# Patient Record
Sex: MALE | Race: BLACK OR AFRICAN AMERICAN | Employment: FULL TIME | URBAN - METROPOLITAN AREA
[De-identification: names, ages, dates, MRNs, and addresses within clinical notes are randomized per-mention and may not be internally consistent; named-entity substitution may affect disease eponyms.]

---

## 2017-10-14 ENCOUNTER — HOSPITAL ENCOUNTER (EMERGENCY)
Age: 47
Discharge: HOME | End: 2017-10-14
Attending: STUDENT IN AN ORGANIZED HEALTH CARE EDUCATION/TRAINING PROGRAM
Payer: SUBSIDIZED

## 2017-10-14 ENCOUNTER — APPOINTMENT (OUTPATIENT)
Dept: GENERAL RADIOLOGY | Age: 47
End: 2017-10-14
Attending: NURSE PRACTITIONER
Payer: SUBSIDIZED

## 2017-10-14 VITALS
DIASTOLIC BLOOD PRESSURE: 95 MMHG | OXYGEN SATURATION: 97 % | WEIGHT: 218 LBS | HEIGHT: 73 IN | BODY MASS INDEX: 28.89 KG/M2 | SYSTOLIC BLOOD PRESSURE: 168 MMHG | TEMPERATURE: 98.6 F | HEART RATE: 98 BPM | RESPIRATION RATE: 18 BRPM

## 2017-10-14 DIAGNOSIS — I10 HYPERTENSION, UNSPECIFIED TYPE: Primary | ICD-10-CM

## 2017-10-14 DIAGNOSIS — M54.50 ACUTE LOW BACK PAIN WITHOUT SCIATICA, UNSPECIFIED BACK PAIN LATERALITY: ICD-10-CM

## 2017-10-14 LAB
ANION GAP SERPL CALC-SCNC: 5 MMOL/L (ref 5–15)
APPEARANCE UR: CLEAR
BACTERIA URNS QL MICRO: NEGATIVE /HPF
BILIRUB UR QL: NEGATIVE
BUN SERPL-MCNC: 11 MG/DL (ref 6–20)
BUN/CREAT SERPL: 11 (ref 12–20)
CALCIUM SERPL-MCNC: 8.7 MG/DL (ref 8.5–10.1)
CHLORIDE SERPL-SCNC: 106 MMOL/L (ref 97–108)
CO2 SERPL-SCNC: 26 MMOL/L (ref 21–32)
COLOR UR: ABNORMAL
CREAT SERPL-MCNC: 1 MG/DL (ref 0.7–1.3)
EPITH CASTS URNS QL MICRO: ABNORMAL /LPF
GLUCOSE SERPL-MCNC: 88 MG/DL (ref 65–100)
GLUCOSE UR STRIP.AUTO-MCNC: NEGATIVE MG/DL
HGB UR QL STRIP: ABNORMAL
HYALINE CASTS URNS QL MICRO: ABNORMAL /LPF (ref 0–5)
KETONES UR QL STRIP.AUTO: NEGATIVE MG/DL
LEUKOCYTE ESTERASE UR QL STRIP.AUTO: NEGATIVE
NITRITE UR QL STRIP.AUTO: NEGATIVE
PH UR STRIP: 5.5 [PH] (ref 5–8)
POTASSIUM SERPL-SCNC: 3.9 MMOL/L (ref 3.5–5.1)
PROT UR STRIP-MCNC: 100 MG/DL
RBC #/AREA URNS HPF: ABNORMAL /HPF (ref 0–5)
SODIUM SERPL-SCNC: 137 MMOL/L (ref 136–145)
SP GR UR REFRACTOMETRY: 1.02 (ref 1–1.03)
UR CULT HOLD, URHOLD: NORMAL
UROBILINOGEN UR QL STRIP.AUTO: 1 EU/DL (ref 0.2–1)
WBC URNS QL MICRO: ABNORMAL /HPF (ref 0–4)

## 2017-10-14 PROCEDURE — 99284 EMERGENCY DEPT VISIT MOD MDM: CPT

## 2017-10-14 PROCEDURE — 74011250637 HC RX REV CODE- 250/637: Performed by: NURSE PRACTITIONER

## 2017-10-14 PROCEDURE — 36415 COLL VENOUS BLD VENIPUNCTURE: CPT | Performed by: NURSE PRACTITIONER

## 2017-10-14 PROCEDURE — 81001 URINALYSIS AUTO W/SCOPE: CPT | Performed by: NURSE PRACTITIONER

## 2017-10-14 PROCEDURE — 80048 BASIC METABOLIC PNL TOTAL CA: CPT | Performed by: NURSE PRACTITIONER

## 2017-10-14 PROCEDURE — 72100 X-RAY EXAM L-S SPINE 2/3 VWS: CPT

## 2017-10-14 RX ORDER — CLONIDINE HYDROCHLORIDE 0.1 MG/1
0.2 TABLET ORAL
Status: COMPLETED | OUTPATIENT
Start: 2017-10-14 | End: 2017-10-14

## 2017-10-14 RX ORDER — ACETAMINOPHEN 325 MG/1
650 TABLET ORAL
Status: COMPLETED | OUTPATIENT
Start: 2017-10-14 | End: 2017-10-14

## 2017-10-14 RX ORDER — HYDROCHLOROTHIAZIDE 25 MG/1
25 TABLET ORAL DAILY
Qty: 14 TAB | Refills: 0 | Status: SHIPPED | OUTPATIENT
Start: 2017-10-14 | End: 2017-10-28

## 2017-10-14 RX ADMIN — ACETAMINOPHEN 650 MG: 325 TABLET, FILM COATED ORAL at 12:36

## 2017-10-14 RX ADMIN — CLONIDINE HYDROCHLORIDE 0.2 MG: 0.1 TABLET ORAL at 12:35
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
## 2017-10-14 NOTE — DISCHARGE INSTRUCTIONS
Back Pain: Care Instructions  Your Care Instructions    Back pain has many possible causes. It is often related to problems with muscles and ligaments of the back. It may also be related to problems with the nerves, discs, or bones of the back. Moving, lifting, standing, sitting, or sleeping in an awkward way can strain the back. Sometimes you don't notice the injury until later. Arthritis is another common cause of back pain. Although it may hurt a lot, back pain usually improves on its own within several weeks. Most people recover in 12 weeks or less. Using good home treatment and being careful not to stress your back can help you feel better sooner. Follow-up care is a key part of your treatment and safety. Be sure to make and go to all appointments, and call your doctor if you are having problems. Its also a good idea to know your test results and keep a list of the medicines you take. How can you care for yourself at home? · Sit or lie in positions that are most comfortable and reduce your pain. Try one of these positions when you lie down:  ¨ Lie on your back with your knees bent and supported by large pillows. ¨ Lie on the floor with your legs on the seat of a sofa or chair. Clevester Sanes on your side with your knees and hips bent and a pillow between your legs. ¨ Lie on your stomach if it does not make pain worse. · Do not sit up in bed, and avoid soft couches and twisted positions. Bed rest can help relieve pain at first, but it delays healing. Avoid bed rest after the first day of back pain. · Change positions every 30 minutes. If you must sit for long periods of time, take breaks from sitting. Get up and walk around, or lie in a comfortable position. · Try using a heating pad on a low or medium setting for 15 to 20 minutes every 2 or 3 hours. Try a warm shower in place of one session with the heating pad. · You can also try an ice pack for 10 to 15 minutes every 2 to 3 hours.  Put a thin cloth between the ice pack and your skin. · Take pain medicines exactly as directed. ¨ If the doctor gave you a prescription medicine for pain, take it as prescribed. ¨ If you are not taking a prescription pain medicine, ask your doctor if you can take an over-the-counter medicine. · Take short walks several times a day. You can start with 5 to 10 minutes, 3 or 4 times a day, and work up to longer walks. Walk on level surfaces and avoid hills and stairs until your back is better. · Return to work and other activities as soon as you can. Continued rest without activity is usually not good for your back. · To prevent future back pain, do exercises to stretch and strengthen your back and stomach. Learn how to use good posture, safe lifting techniques, and proper body mechanics. When should you call for help? Call your doctor now or seek immediate medical care if:  · You have new or worsening numbness in your legs. · You have new or worsening weakness in your legs. (This could make it hard to stand up.)  · You lose control of your bladder or bowels. Watch closely for changes in your health, and be sure to contact your doctor if:  · Your pain gets worse. · You are not getting better after 2 weeks. Where can you learn more? Go to http://mayela-rhiannon.info/. Enter B221 in the search box to learn more about \"Back Pain: Care Instructions. \"  Current as of: March 21, 2017  Content Version: 11.3  © 3054-5192 [employer]. Care instructions adapted under license by [employer] (which disclaims liability or warranty for this information). If you have questions about a medical condition or this instruction, always ask your healthcare professional. [address] any warranty or liability for your use of this information. DASH Diet: Care Instructions  Your Care Instructions  The DASH diet is an eating plan that can help lower your blood pressure.  DASH stands for Dietary Approaches to Stop Hypertension. Hypertension is high blood pressure. The DASH diet focuses on eating foods that are high in calcium, potassium, and magnesium. These nutrients can lower blood pressure. The foods that are highest in these nutrients are fruits, vegetables, low-fat dairy products, nuts, seeds, and legumes. But taking calcium, potassium, and magnesium supplements instead of eating foods that are high in those nutrients does not have the same effect. The DASH diet also includes whole grains, fish, and poultry. The DASH diet is one of several lifestyle changes your doctor may recommend to lower your high blood pressure. Your doctor may also want you to decrease the amount of sodium in your diet. Lowering sodium while following the DASH diet can lower blood pressure even further than just the DASH diet alone. Follow-up care is a key part of your treatment and safety. Be sure to make and go to all appointments, and call your doctor if you are having problems. It's also a good idea to know your test results and keep a list of the medicines you take. How can you care for yourself at home? Following the DASH diet  · Eat 4 to 5 servings of fruit each day. A serving is 1 medium-sized piece of fruit, ½ cup chopped or canned fruit, 1/4 cup dried fruit, or 4 ounces (½ cup) of fruit juice. Choose fruit more often than fruit juice. · Eat 4 to 5 servings of vegetables each day. A serving is 1 cup of lettuce or raw leafy vegetables, ½ cup of chopped or cooked vegetables, or 4 ounces (½ cup) of vegetable juice. Choose vegetables more often than vegetable juice. · Get 2 to 3 servings of low-fat and fat-free dairy each day. A serving is 8 ounces of milk, 1 cup of yogurt, or 1 ½ ounces of cheese. · Eat 6 to 8 servings of grains each day.  A serving is 1 slice of bread, 1 ounce of dry cereal, or ½ cup of cooked rice, pasta, or cooked cereal. Try to choose whole-grain products as much as possible. · Limit lean meat, poultry, and fish to 2 servings each day. A serving is 3 ounces, about the size of a deck of cards. · Eat 4 to 5 servings of nuts, seeds, and legumes (cooked dried beans, lentils, and split peas) each week. A serving is 1/3 cup of nuts, 2 tablespoons of seeds, or ½ cup of cooked beans or peas. · Limit fats and oils to 2 to 3 servings each day. A serving is 1 teaspoon of vegetable oil or 2 tablespoons of salad dressing. · Limit sweets and added sugars to 5 servings or less a week. A serving is 1 tablespoon jelly or jam, ½ cup sorbet, or 1 cup of lemonade. · Eat less than 2,300 milligrams (mg) of sodium a day. If you limit your sodium to 1,500 mg a day, you can lower your blood pressure even more. Tips for success  · Start small. Do not try to make dramatic changes to your diet all at once. You might feel that you are missing out on your favorite foods and then be more likely to not follow the plan. Make small changes, and stick with them. Once those changes become habit, add a few more changes. · Try some of the following:  ¨ Make it a goal to eat a fruit or vegetable at every meal and at snacks. This will make it easy to get the recommended amount of fruits and vegetables each day. ¨ Try yogurt topped with fruit and nuts for a snack or healthy dessert. ¨ Add lettuce, tomato, cucumber, and onion to sandwiches. ¨ Combine a ready-made pizza crust with low-fat mozzarella cheese and lots of vegetable toppings. Try using tomatoes, squash, spinach, broccoli, carrots, cauliflower, and onions. ¨ Have a variety of cut-up vegetables with a low-fat dip as an appetizer instead of chips and dip. ¨ Sprinkle sunflower seeds or chopped almonds over salads. Or try adding chopped walnuts or almonds to cooked vegetables. ¨ Try some vegetarian meals using beans and peas. Add garbanzo or kidney beans to salads. Make burritos and tacos with mashed [place] beans or black beans.   Where can you learn more?  Go to http://mayela-rhiannon.info/. Enter U512 in the search box to learn more about \"DASH Diet: Care Instructions. \"  Current as of: April 3, 2017  Content Version: 11.3  © 9929-8570 [employer]. Care instructions adapted under license by [employer] (which disclaims liability or warranty for this information). If you have questions about a medical condition or this instruction, always ask your healthcare professional. [address] any warranty or liability for your use of this information. High Blood Pressure: Care Instructions  Your Care Instructions  If your blood pressure is usually above 140/90, you have high blood pressure, or hypertension. That means the top number is 140 or higher or the bottom number is 90 or higher, or both. Despite what a lot of people think, high blood pressure usually doesn't cause headaches or make you feel dizzy or lightheaded. It usually has no symptoms. But it does increase your risk for heart attack, stroke, and kidney or eye damage. The higher your blood pressure, the more your risk increases. Your doctor will give you a goal for your blood pressure. Your goal will be based on your health and your age. An example of a goal is to keep your blood pressure below 140/90. Lifestyle changes, such as eating healthy and being active, are always important to help lower blood pressure. You might also take medicine to reach your blood pressure goal.  Follow-up care is a key part of your treatment and safety. Be sure to make and go to all appointments, and call your doctor if you are having problems. It's also a good idea to know your test results and keep a list of the medicines you take. How can you care for yourself at home? Medical treatment  · If you stop taking your medicine, your blood pressure will go back up. You may take one or more types of medicine to lower your blood pressure.  Be safe with medicines. Take your medicine exactly as prescribed. Call your doctor if you think you are having a problem with your medicine. · Talk to your doctor before you start taking aspirin every day. Aspirin can help certain people lower their risk of a heart attack or stroke. But taking aspirin isn't right for everyone, because it can cause serious bleeding. · See your doctor regularly. You may need to see the doctor more often at first or until your blood pressure comes down. · If you are taking blood pressure medicine, talk to your doctor before you take decongestants or anti-inflammatory medicine, such as ibuprofen. Some of these medicines can raise blood pressure. · Learn how to check your blood pressure at home. Lifestyle changes  · Stay at a healthy weight. This is especially important if you put on weight around the waist. Losing even 10 pounds can help you lower your blood pressure. · If your doctor recommends it, get more exercise. Walking is a good choice. Bit by bit, increase the amount you walk every day. Try for at least 30 minutes on most days of the week. You also may want to swim, bike, or do other activities. · Avoid or limit alcohol. Talk to your doctor about whether you can drink any alcohol. · Try to limit how much sodium you eat to less than 2,300 milligrams (mg) a day. Your doctor may ask you to try to eat less than 1,500 mg a day. · Eat plenty of fruits (such as bananas and oranges), vegetables, legumes, whole grains, and low-fat dairy products. · Lower the amount of saturated fat in your diet. Saturated fat is found in animal products such as milk, cheese, and meat. Limiting these foods may help you lose weight and also lower your risk for heart disease. · Do not smoke. Smoking increases your risk for heart attack and stroke. If you need help quitting, talk to your doctor about stop-smoking programs and medicines. These can increase your chances of quitting for good.   When should you call for help? Call 911 anytime you think you may need emergency care. This may mean having symptoms that suggest that your blood pressure is causing a serious heart or blood vessel problem. Your blood pressure may be over 180/110. For example, call 911 if:  · You have symptoms of a heart attack. These may include:  ¨ Chest pain or pressure, or a strange feeling in the chest.  ¨ Sweating. ¨ Shortness of breath. ¨ Nausea or vomiting. ¨ Pain, pressure, or a strange feeling in the back, neck, jaw, or upper belly or in one or both shoulders or arms. ¨ Lightheadedness or sudden weakness. ¨ A fast or irregular heartbeat. · You have symptoms of a stroke. These may include:  ¨ Sudden numbness, tingling, weakness, or loss of movement in your face, arm, or leg, especially on only one side of your body. ¨ Sudden vision changes. ¨ Sudden trouble speaking. ¨ Sudden confusion or trouble understanding simple statements. ¨ Sudden problems with walking or balance. ¨ A sudden, severe headache that is different from past headaches. · You have severe back or belly pain. Do not wait until your blood pressure comes down on its own. Get help right away. Call your doctor now or seek immediate care if:  · Your blood pressure is much higher than normal (such as 180/110 or higher), but you don't have symptoms. · You think high blood pressure is causing symptoms, such as:  ¨ Severe headache. ¨ Blurry vision. Watch closely for changes in your health, and be sure to contact your doctor if:  · Your blood pressure measures 140/90 or higher at least 2 times. That means the top number is 140 or higher or the bottom number is 90 or higher, or both. · You think you may be having side effects from your blood pressure medicine. · Your blood pressure is usually normal, but it goes above normal at least 2 times. Where can you learn more? Go to http://mayela-rhiannon.info/.   Enter Q744 in the search box to learn more about \"High Blood Pressure: Care Instructions. \"  Current as of: August 8, 2016  Content Version: 11.3  © 3212-4800 [employer]. Care instructions adapted under license by [employer] (which disclaims liability or warranty for this information). If you have questions about a medical condition or this instruction, always ask your healthcare professional. [address] any warranty or liability for your use of this information.

## 2017-10-14 NOTE — ED NOTES
The patient was discharged home by ER NP in stable condition, accompanied by parent/guardian . The patient is alert and oriented, is in no respiratory distress and has vital signs within normal limits . The patient's diagnosis, condition and treatment were explained to patient or parent/guardian. The patient/responsible party expressed understanding. 1 prescriptions given to pt. No work/school note given to pt. A discharge plan has been developed. A  was not involved in the process. Aftercare instructions were given to the patient.
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
## 2017-10-14 NOTE — ED NOTES
Bedside shift change report given to Bryan Dang (oncoming nurse) by Roxana Connor RN  (offgoing nurse). Report included the following information SBAR.

## 2017-10-14 NOTE — ED NOTES
Bedside report received from [facility] [place]. Pt resting on stretcher. BP remains elevated. Jeny Jameson NP aware. Will continue to monitor.

## 2018-02-13 ENCOUNTER — APPOINTMENT (OUTPATIENT)
Dept: GENERAL RADIOLOGY | Age: 48
DRG: 065 | End: 2018-02-13
Attending: EMERGENCY MEDICINE
Payer: SUBSIDIZED

## 2018-02-13 ENCOUNTER — APPOINTMENT (OUTPATIENT)
Dept: CT IMAGING | Age: 48
DRG: 065 | End: 2018-02-13
Attending: HOSPITALIST
Payer: SUBSIDIZED

## 2018-02-13 ENCOUNTER — APPOINTMENT (OUTPATIENT)
Dept: MRI IMAGING | Age: 48
DRG: 065 | End: 2018-02-13
Attending: HOSPITALIST
Payer: SUBSIDIZED

## 2018-02-13 ENCOUNTER — HOSPITAL ENCOUNTER (INPATIENT)
Age: 48
LOS: 13 days | Discharge: NURSING, REHAB OR LONG-TERM CARE | DRG: 065 | End: 2018-02-26
Attending: EMERGENCY MEDICINE | Admitting: HOSPITALIST
Payer: SUBSIDIZED

## 2018-02-13 ENCOUNTER — APPOINTMENT (OUTPATIENT)
Dept: CT IMAGING | Age: 48
DRG: 065 | End: 2018-02-13
Attending: EMERGENCY MEDICINE
Payer: SUBSIDIZED

## 2018-02-13 DIAGNOSIS — I63.9 CEREBROVASCULAR ACCIDENT (CVA), UNSPECIFIED MECHANISM (HCC): Primary | ICD-10-CM

## 2018-02-13 DIAGNOSIS — R94.31 ABNORMAL EKG: ICD-10-CM

## 2018-02-13 DIAGNOSIS — G81.94 HEMIPARESIS OF LEFT NONDOMINANT SIDE, UNSPECIFIED HEMIPARESIS ETIOLOGY (HCC): ICD-10-CM

## 2018-02-13 DIAGNOSIS — E78.00 HYPERCHOLESTEREMIA: ICD-10-CM

## 2018-02-13 DIAGNOSIS — I16.0 HYPERTENSIVE URGENCY: ICD-10-CM

## 2018-02-13 PROBLEM — R53.1 LEFT-SIDED WEAKNESS: Status: ACTIVE | Noted: 2018-02-13

## 2018-02-13 LAB
ALBUMIN SERPL-MCNC: 3.9 G/DL (ref 3.5–5)
ALBUMIN/GLOB SERPL: 0.9 {RATIO} (ref 1.1–2.2)
ALP SERPL-CCNC: 111 U/L (ref 45–117)
ALT SERPL-CCNC: 25 U/L (ref 12–78)
ANION GAP SERPL CALC-SCNC: 8 MMOL/L (ref 5–15)
AST SERPL-CCNC: 22 U/L (ref 15–37)
ATRIAL RATE: 100 BPM
ATRIAL RATE: 87 BPM
BASOPHILS # BLD: 0.1 K/UL (ref 0–0.1)
BASOPHILS NFR BLD: 1 % (ref 0–1)
BILIRUB SERPL-MCNC: 0.4 MG/DL (ref 0.2–1)
BUN SERPL-MCNC: 12 MG/DL (ref 6–20)
BUN/CREAT SERPL: 11 (ref 12–20)
CALCIUM SERPL-MCNC: 9.4 MG/DL (ref 8.5–10.1)
CALCULATED P AXIS, ECG09: 60 DEGREES
CALCULATED P AXIS, ECG09: 63 DEGREES
CALCULATED R AXIS, ECG10: -10 DEGREES
CALCULATED R AXIS, ECG10: -11 DEGREES
CALCULATED T AXIS, ECG11: 161 DEGREES
CALCULATED T AXIS, ECG11: 169 DEGREES
CHLORIDE SERPL-SCNC: 104 MMOL/L (ref 97–108)
CO2 SERPL-SCNC: 25 MMOL/L (ref 21–32)
CREAT SERPL-MCNC: 1.11 MG/DL (ref 0.7–1.3)
DIAGNOSIS, 93000: NORMAL
DIAGNOSIS, 93000: NORMAL
DIFFERENTIAL METHOD BLD: ABNORMAL
EOSINOPHIL # BLD: 0 K/UL (ref 0–0.4)
EOSINOPHIL NFR BLD: 0 % (ref 0–7)
ERYTHROCYTE [DISTWIDTH] IN BLOOD BY AUTOMATED COUNT: 13.4 % (ref 11.5–14.5)
GLOBULIN SER CALC-MCNC: 4.3 G/DL (ref 2–4)
GLUCOSE BLD STRIP.AUTO-MCNC: 108 MG/DL (ref 65–100)
GLUCOSE SERPL-MCNC: 119 MG/DL (ref 65–100)
HCT VFR BLD AUTO: 47.7 % (ref 36.6–50.3)
HGB BLD-MCNC: 15.6 G/DL (ref 12.1–17)
IMM GRANULOCYTES # BLD: 0.1 K/UL (ref 0–0.04)
IMM GRANULOCYTES NFR BLD AUTO: 1 % (ref 0–0.5)
INR BLD: 1 (ref 0.9–1.2)
LYMPHOCYTES # BLD: 2.2 K/UL (ref 0.8–3.5)
LYMPHOCYTES NFR BLD: 21 % (ref 12–49)
MAGNESIUM SERPL-MCNC: 1.6 MG/DL (ref 1.6–2.4)
MCH RBC QN AUTO: 26.1 PG (ref 26–34)
MCHC RBC AUTO-ENTMCNC: 32.7 G/DL (ref 30–36.5)
MCV RBC AUTO: 79.8 FL (ref 80–99)
MONOCYTES # BLD: 0.8 K/UL (ref 0–1)
MONOCYTES NFR BLD: 8 % (ref 5–13)
NEUTS SEG # BLD: 7.4 K/UL (ref 1.8–8)
NEUTS SEG NFR BLD: 70 % (ref 32–75)
NRBC # BLD: 0 K/UL (ref 0–0.01)
NRBC BLD-RTO: 0 PER 100 WBC
P-R INTERVAL, ECG05: 136 MS
P-R INTERVAL, ECG05: 148 MS
PLATELET # BLD AUTO: 284 K/UL (ref 150–400)
PMV BLD AUTO: 11.5 FL (ref 8.9–12.9)
POTASSIUM SERPL-SCNC: 3.7 MMOL/L (ref 3.5–5.1)
PROT SERPL-MCNC: 8.2 G/DL (ref 6.4–8.2)
Q-T INTERVAL, ECG07: 346 MS
Q-T INTERVAL, ECG07: 380 MS
QRS DURATION, ECG06: 88 MS
QRS DURATION, ECG06: 94 MS
QTC CALCULATION (BEZET), ECG08: 446 MS
QTC CALCULATION (BEZET), ECG08: 457 MS
RBC # BLD AUTO: 5.98 M/UL (ref 4.1–5.7)
SERVICE CMNT-IMP: ABNORMAL
SODIUM SERPL-SCNC: 137 MMOL/L (ref 136–145)
TROPONIN I SERPL-MCNC: 0.07 NG/ML
TROPONIN I SERPL-MCNC: 0.09 NG/ML
TROPONIN I SERPL-MCNC: 0.1 NG/ML
TSH SERPL DL<=0.05 MIU/L-ACNC: 0.72 UIU/ML (ref 0.36–3.74)
VENTRICULAR RATE, ECG03: 100 BPM
VENTRICULAR RATE, ECG03: 87 BPM
WBC # BLD AUTO: 10.5 K/UL (ref 4.1–11.1)

## 2018-02-13 PROCEDURE — 74011000250 HC RX REV CODE- 250: Performed by: EMERGENCY MEDICINE

## 2018-02-13 PROCEDURE — 96374 THER/PROPH/DIAG INJ IV PUSH: CPT

## 2018-02-13 PROCEDURE — 70450 CT HEAD/BRAIN W/O DYE: CPT

## 2018-02-13 PROCEDURE — 84484 ASSAY OF TROPONIN QUANT: CPT | Performed by: EMERGENCY MEDICINE

## 2018-02-13 PROCEDURE — 36415 COLL VENOUS BLD VENIPUNCTURE: CPT | Performed by: NURSE PRACTITIONER

## 2018-02-13 PROCEDURE — 74011250637 HC RX REV CODE- 250/637: Performed by: EMERGENCY MEDICINE

## 2018-02-13 PROCEDURE — 93005 ELECTROCARDIOGRAM TRACING: CPT

## 2018-02-13 PROCEDURE — 74011636320 HC RX REV CODE- 636/320: Performed by: EMERGENCY MEDICINE

## 2018-02-13 PROCEDURE — 65660000000 HC RM CCU STEPDOWN

## 2018-02-13 PROCEDURE — 85025 COMPLETE CBC W/AUTO DIFF WBC: CPT | Performed by: EMERGENCY MEDICINE

## 2018-02-13 PROCEDURE — 74011000250 HC RX REV CODE- 250: Performed by: HOSPITALIST

## 2018-02-13 PROCEDURE — 74011250636 HC RX REV CODE- 250/636: Performed by: HOSPITALIST

## 2018-02-13 PROCEDURE — 74011000258 HC RX REV CODE- 258: Performed by: EMERGENCY MEDICINE

## 2018-02-13 PROCEDURE — 71045 X-RAY EXAM CHEST 1 VIEW: CPT

## 2018-02-13 PROCEDURE — 85610 PROTHROMBIN TIME: CPT

## 2018-02-13 PROCEDURE — 84443 ASSAY THYROID STIM HORMONE: CPT | Performed by: NURSE PRACTITIONER

## 2018-02-13 PROCEDURE — 80053 COMPREHEN METABOLIC PANEL: CPT | Performed by: EMERGENCY MEDICINE

## 2018-02-13 PROCEDURE — 74011250637 HC RX REV CODE- 250/637: Performed by: HOSPITALIST

## 2018-02-13 PROCEDURE — 70496 CT ANGIOGRAPHY HEAD: CPT

## 2018-02-13 PROCEDURE — 99285 EMERGENCY DEPT VISIT HI MDM: CPT

## 2018-02-13 PROCEDURE — 82962 GLUCOSE BLOOD TEST: CPT

## 2018-02-13 PROCEDURE — 74177 CT ABD & PELVIS W/CONTRAST: CPT

## 2018-02-13 PROCEDURE — 94762 N-INVAS EAR/PLS OXIMTRY CONT: CPT

## 2018-02-13 PROCEDURE — 83735 ASSAY OF MAGNESIUM: CPT | Performed by: NURSE PRACTITIONER

## 2018-02-13 PROCEDURE — 71260 CT THORAX DX C+: CPT

## 2018-02-13 PROCEDURE — 70498 CT ANGIOGRAPHY NECK: CPT

## 2018-02-13 RX ORDER — ACETAMINOPHEN 650 MG/1
650 SUPPOSITORY RECTAL
Status: DISCONTINUED | OUTPATIENT
Start: 2018-02-13 | End: 2018-02-26

## 2018-02-13 RX ORDER — FAMOTIDINE 20 MG/1
20 TABLET, FILM COATED ORAL EVERY 12 HOURS
Status: DISCONTINUED | OUTPATIENT
Start: 2018-02-13 | End: 2018-02-26 | Stop reason: HOSPADM

## 2018-02-13 RX ORDER — BISACODYL 5 MG
5 TABLET, DELAYED RELEASE (ENTERIC COATED) ORAL DAILY PRN
Status: DISCONTINUED | OUTPATIENT
Start: 2018-02-13 | End: 2018-02-26 | Stop reason: HOSPADM

## 2018-02-13 RX ORDER — GUAIFENESIN 100 MG/5ML
325 LIQUID (ML) ORAL
Status: COMPLETED | OUTPATIENT
Start: 2018-02-13 | End: 2018-02-13

## 2018-02-13 RX ORDER — METOPROLOL TARTRATE 25 MG/1
25 TABLET, FILM COATED ORAL 2 TIMES DAILY
Status: DISCONTINUED | OUTPATIENT
Start: 2018-02-13 | End: 2018-02-13

## 2018-02-13 RX ORDER — ACETAMINOPHEN 325 MG/1
650 TABLET ORAL
Status: DISCONTINUED | OUTPATIENT
Start: 2018-02-13 | End: 2018-02-26

## 2018-02-13 RX ORDER — LABETALOL HYDROCHLORIDE 5 MG/ML
20 INJECTION, SOLUTION INTRAVENOUS
Status: COMPLETED | OUTPATIENT
Start: 2018-02-13 | End: 2018-02-13

## 2018-02-13 RX ORDER — MAGNESIUM SULFATE HEPTAHYDRATE 40 MG/ML
2 INJECTION, SOLUTION INTRAVENOUS ONCE
Status: COMPLETED | OUTPATIENT
Start: 2018-02-13 | End: 2018-02-13

## 2018-02-13 RX ORDER — ESMOLOL HYDROCHLORIDE 10 MG/ML
50 INJECTION, SOLUTION INTRAVENOUS
Status: DISCONTINUED | OUTPATIENT
Start: 2018-02-13 | End: 2018-02-13

## 2018-02-13 RX ORDER — SODIUM CHLORIDE 0.9 % (FLUSH) 0.9 %
10 SYRINGE (ML) INJECTION
Status: COMPLETED | OUTPATIENT
Start: 2018-02-13 | End: 2018-02-13

## 2018-02-13 RX ORDER — SODIUM CHLORIDE 9 MG/ML
75 INJECTION, SOLUTION INTRAVENOUS CONTINUOUS
Status: DISPENSED | OUTPATIENT
Start: 2018-02-13 | End: 2018-02-14

## 2018-02-13 RX ORDER — SODIUM CHLORIDE 0.9 % (FLUSH) 0.9 %
30 SYRINGE (ML) INJECTION
Status: COMPLETED | OUTPATIENT
Start: 2018-02-13 | End: 2018-02-13

## 2018-02-13 RX ORDER — GUAIFENESIN 100 MG/5ML
81 LIQUID (ML) ORAL DAILY
Status: DISCONTINUED | OUTPATIENT
Start: 2018-02-14 | End: 2018-02-26 | Stop reason: HOSPADM

## 2018-02-13 RX ORDER — DOCUSATE SODIUM 100 MG/1
100 CAPSULE, LIQUID FILLED ORAL 2 TIMES DAILY
Status: DISCONTINUED | OUTPATIENT
Start: 2018-02-13 | End: 2018-02-26 | Stop reason: HOSPADM

## 2018-02-13 RX ORDER — LABETALOL HYDROCHLORIDE 5 MG/ML
5 INJECTION, SOLUTION INTRAVENOUS
Status: DISCONTINUED | OUTPATIENT
Start: 2018-02-13 | End: 2018-02-15

## 2018-02-13 RX ORDER — ONDANSETRON 2 MG/ML
4 INJECTION INTRAMUSCULAR; INTRAVENOUS
Status: DISCONTINUED | OUTPATIENT
Start: 2018-02-13 | End: 2018-02-26 | Stop reason: HOSPADM

## 2018-02-13 RX ADMIN — Medication 30 ML: at 15:39

## 2018-02-13 RX ADMIN — DOCUSATE SODIUM 100 MG: 100 CAPSULE, LIQUID FILLED ORAL at 18:00

## 2018-02-13 RX ADMIN — LABETALOL HYDROCHLORIDE 5 MG: 5 INJECTION INTRAVENOUS at 21:36

## 2018-02-13 RX ADMIN — ASPIRIN 81 MG 324 MG: 81 TABLET ORAL at 13:14

## 2018-02-13 RX ADMIN — SODIUM CHLORIDE 5 MG/HR: 900 INJECTION, SOLUTION INTRAVENOUS at 22:30

## 2018-02-13 RX ADMIN — METOPROLOL TARTRATE 25 MG: 25 TABLET ORAL at 15:52

## 2018-02-13 RX ADMIN — SODIUM CHLORIDE 100 ML: 900 INJECTION, SOLUTION INTRAVENOUS at 15:04

## 2018-02-13 RX ADMIN — IOPAMIDOL 100 ML: 755 INJECTION, SOLUTION INTRAVENOUS at 15:04

## 2018-02-13 RX ADMIN — LABETALOL HYDROCHLORIDE 20 MG: 5 INJECTION INTRAVENOUS at 13:13

## 2018-02-13 RX ADMIN — FAMOTIDINE 20 MG: 20 TABLET, FILM COATED ORAL at 21:01

## 2018-02-13 RX ADMIN — MAGNESIUM SULFATE HEPTAHYDRATE 2 G: 40 INJECTION, SOLUTION INTRAVENOUS at 18:46

## 2018-02-13 RX ADMIN — SODIUM CHLORIDE 75 ML/HR: 900 INJECTION, SOLUTION INTRAVENOUS at 18:47

## 2018-02-13 RX ADMIN — Medication 10 ML: at 15:04
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
## 2018-02-13 NOTE — IP AVS SNAPSHOT
[address] [facility] [address] 
[phone] Patient: Olga Woodruff MRN: [id number] About your hospitalization You were admitted on:  February 13, 2018 You last received care in the:  [id number] You were discharged on:  February 26, 2018 Why you were hospitalized Your primary diagnosis was:  Not on File Your diagnoses also included:  Left-Sided Weakness, Hypertensive Urgency, Abnormal Ekg, Acute Cva (Cerebrovascular Accident) (Hcc) Follow-up Information Follow up With Details Comments Contact Info [address] [place] [address] In 1 day Home Health for PT after discharge from [facility] 
1st Floor [facility] [address] 
[phone] [address] [place] [address] 
[phone] Ernie Collins On 3/19/2018 Financial Screening Appointment:  [facility] financial screening appt on Monday, 3/19/18 @ 1:00 p.m. Patient needs to provide completed application, photo ID and proof of income. [address] [facility] [address] [facility] [address] 
[phone] Discharge Orders None A check garry indicates which time of day the medication should be taken. My Medications START taking these medications Instructions Each Dose to Equal  
 Morning Noon Evening Bedtime  
 amLODIPine 10 mg tablet Commonly known as:  Alfonso Carranza Start taking on:  2/27/2018 Your last dose was: Your next dose is: Take 1 Tab by mouth daily. 10 mg  
    
   
   
   
  
 aspirin 81 mg chewable tablet Start taking on:  2/27/2018 Your last dose was: Your next dose is: Take 1 Tab by mouth daily. 81 mg  
    
   
   
   
  
 atorvastatin 20 mg tablet Commonly known as:  LIPITOR Your last dose was: Your next dose is: Take 1 Tab by mouth nightly. 20 mg  
    
   
   
   
  
 carvedilol 12.5 mg tablet Commonly known as:  Jamel Waters Your last dose was: Your next dose is: Take 1 Tab by mouth two (2) times a day. 12.5 mg  
    
   
   
   
  
 docusate sodium 100 mg capsule Commonly known as:  Lotus Pritchard Your last dose was: Your next dose is: Take 1 Cap by mouth two (2) times a day for 90 days. 100 mg  
    
   
   
   
  
 lisinopril 20 mg tablet Commonly known as:  Saurabh Guadalupe Start taking on:  2/27/2018 Your last dose was: Your next dose is: Take 1 Tab by mouth daily. 20 mg Where to Get Your Medications Information on where to get these meds will be given to you by the nurse or doctor. ! Ask your nurse or doctor about these medications  
  amLODIPine 10 mg tablet  
 aspirin 81 mg chewable tablet  
 atorvastatin 20 mg tablet  
 carvedilol 12.5 mg tablet  
 docusate sodium 100 mg capsule  
 lisinopril 20 mg tablet Discharge Instructions Discharge Summary  
  
  
PATIENT ID: Olga Woodruff MRN: [id number] YOB: 1970 DATE OF ADMISSION: 2/13/2018 12:39 PM   
DATE OF DISCHARGE: 2/26/2018 PRIMARY CARE PROVIDER: None  
  
ATTENDING PHYSICIAN: Dr. Pineda [facility] DISCHARGING PROVIDER: Arsh Richter NP To contact this individual call [phone] and ask the  to page. If unavailable ask to be transferred the Adult Hospitalist Department. 
  
CONSULTATIONS: IP CONSULT TO CARDIOLOGY 
IP CONSULT TO NEUROLOGY 
  
PROCEDURES/SURGERIES: * No surgery found * 
  
[address] COURSE:  
Mr. Dianne Stiles is a 49 y/o right handed AA male who presented on 2/13 with uncontrolled hypertension, left arm/lefg weakness.   Pt has not seen PCP or taken medications for htn in several years due to lack of medical insurance. Pt had a CT of the head revealing multiple hypodensities concerning for prior infarction, though the patient denied any known history of stroke. MRI brain  confirmed acute right periventricular white matter ischemic CVA.  Multiple old lacunar infarcts in bilateral cerebral white matter, alex, and cerebellum. Multiple punctate areas of hemosiderin deposit. VSS, pt stable for rehab. MUST have follow up with [facility] and take blood pressure mediations as directed.  
  
DISCHARGE DIAGNOSES / PLAN:   
  
Acute Right Periventricular white matter ischemic CVA 
- not a candidate for tPA on admission per neurology - CTA head/neck- Multifocal areas of presumably remote ischemia in the basal ganglia. - MRI showed acute nonhemorrhagic right deep cerebral white matter infarct/ischemia and extensive chronic ischemic findings.  
- evaluated by neurology, c/w htn control, asa, statin  
- pt/ot 
  
Hypertensive Urgency: Secondary to medication non compliance - Improved 
- has not seen a PCP in years, no insurance. Must f/u at [facility] on [facility] - BP has improved, even on the lower side at times. - Continue Norvasc 10 mg PO daily, coreg 12.5 mg BID. Lisinopril 20 mg daily 
- Goal SBP < 130/80 mm hg 
- low salt diet 
   
Acute renal insufficiency: RESOVLED Probably multifactorial including ACEi and inadequate PO intake.  
  
Abnormal EKG/Elevated troponin:  
- CT chest/abd/pel without acute finding. Appreciated cardiology input.  
- ECHO with normal EF and LVH 
- non specific elevated troponin likely 2/2 htn 
  
DM2 
- a1c 6.6 
   
Dyslipidemia - , started on atorvastatin 20mg daily, will need fasting lipids and CMP checked in 6-8 weeks 
  
   
VTE prophylaxis: SCD Code status: Full Discussed plan of care with Patient/Family and Nurse.   
Pre-admission lived at home.   
  
  
PENDING TEST RESULTS:  
At the time of discharge the following test results are still pending: none 
  
 FOLLOW UP APPOINTMENTS:   
Follow-up Information Follow up With Details Comments Contact Info  
  Danyell Serrano NP On 3/15/2018 Hospital follow up PCP appointment from Rehab for Thursday, 3/15/18 @ 1:00 p.m. Patient needs to arrive 15 minutes early, with photo ID, insurance card and a listing of all medications. [phone] [facility] In 1 day Home Health for PT after discharge from [facility] 
1st Floor [address]   Financial Screening Appointment:  Corinne Soto [address] [facility] [address] [phone]  
  
  
  
ADDITIONAL CARE RECOMMENDATIONS: As above 
  
DIET: Cardiac 
  
ACTIVITY: per rehab 
  
WOUND CARE: none 
  
EQUIPMENT needed: none 
  
  
DISCHARGE MEDICATIONS: 
   
Current Discharge Medication List  
   
   
START taking these medications  
  Details  
amLODIPine (NORVASC) 10 mg tablet Take 1 Tab by mouth daily. Qty: 30 Tab, Refills: 0  
   
carvedilol (COREG) 12.5 mg tablet Take 1 Tab by mouth two (2) times a day. Qty: 60 Tab, Refills: 0  
   
lisinopril (PRINIVIL, ZESTRIL) 20 mg tablet Take 1 Tab by mouth daily. Qty: 30 Tab, Refills: 0  
   
docusate sodium (COLACE) 100 mg capsule Take 1 Cap by mouth two (2) times a day for 90 days. Qty: 60 Cap, Refills: 2  
   
atorvastatin (LIPITOR) 20 mg tablet Take 1 Tab by mouth nightly. Qty: 30 Tab, Refills: 0  
   
aspirin 81 mg chewable tablet Take 1 Tab by mouth daily. Qty: 30 Tab, Refills: 0  
   
   
  
  
  
NOTIFY YOUR PHYSICIAN FOR ANY OF THE FOLLOWING:  
Fever over 101 degrees for 24 hours. Chest pain, shortness of breath, fever, chills, nausea, vomiting, diarrhea, change in mentation, falling, weakness, bleeding.  Severe pain or pain not relieved by medications. Or, any other signs or symptoms that you may have questions about. 
  
DISPOSITION: 
   Home With: 
  OT   PT   HH   RN  
  
xx Long term SNF/Inpatient Rehab  
  Independent/assisted living  
  Hospice  
  Other:  
  
  
PATIENT CONDITION AT DISCHARGE:  
  
Functional status  
  Poor   
xx Deconditioned   
  Independent   
  
Cognition  
 xx Lucid   
  Forgetful   
  Dementia   
  
Catheters/lines (plus indication)  
  Braden   
  PICC   
  PEG   
xx None   
  
Code status  
 xx Full code   
  DNR   
  
PHYSICAL EXAMINATION AT DISCHARGE: 
General:  Alert, cooperative, no distress, appears stated age. He is comfortably lying in the bed. Lungs:   Clear to auscultation bilaterally. Chest wall:  No tenderness or deformity. Heart:  Regular rate and rhythm, S1, S2 normal, no murmur. Abdomen:   Soft, non-tender. Bowel sounds normal.   
Extremities: Extremities normal, atraumatic, no cyanosis or edema. Pulses: 2+ and symmetric all extremities. Skin: Skin color, texture, turgor normal. No rashes or lesions Neurologic:  AA&Ox3, speech not slurred. Normal memory. No facial asymmetry. EOMI, + dsymetria with FTN on left. LLE strength is 4/5  
  
  
CHRONIC MEDICAL DIAGNOSES: 
Problem List as of 2/26/2018  Date Reviewed: 2/26/2018  
          Codes Class Noted - Resolved  
  Left-sided weakness ICD-10-CM: R53.1 ICD-9-CM: 728.87   2/13/2018 - Present  
     
  Acute CVA (cerebrovascular accident) ([facility] [address] ICD-10-CM: I63.9 ICD-9-CM: 434.91   2/13/2018 - Present  
     
  RESOLVED: Hypertensive urgency ICD-10-CM: I16.0 ICD-9-CM: 723. 9   2/13/2018 - 2/26/2018  
     
  RESOLVED: Abnormal EKG ICD-10-CM: R94.31 
ICD-9-CM: 794.31   2/13/2018 - 2/26/2018  
     
  
  
  
Greater than 30 minutes were spent with the patient on counseling and coordination of care 
  
Signed:  
Isabel Wan NP 
2/26/2018 11:55 AM 
   
   
  
     
   
 
MyChart Activation Thank you for requesting access to [employer] Please follow the instructions below to securely access and download your online medical record. [employer] allows you to send messages to your doctor, view your test results, renew your prescriptions, schedule appointments, and more. How Do I Sign Up? 1. In your internet browser, go to [email or web address] 
2. Click on the First Time User? Click Here link in the Sign In box. You will be redirect to the New Member Sign Up page. 3. Enter your [employer] Access Code exactly as it appears below. You will not need to use this code after youve completed the sign-up process. If you do not sign up before the expiration date, you must request a new code. [employer] Access Code: [email or web address] Expires: 2018  3:14 PM (This is the date your [employer] access code will ) 4. Enter the last four digits of your Social Security Number (xxxx) and Date of Birth (mm/dd/yyyy) as indicated and click Submit. You will be taken to the next sign-up page. 5. Create a [employer] ID. This will be your [employer] login ID and cannot be changed, so think of one that is secure and easy to remember. 6. Create a [employer] password. You can change your password at any time. 7. Enter your Password Reset Question and Answer. This can be used at a later time if you forget your password. 8. Enter your e-mail address. You will receive e-mail notification when new information is available in [address] 9. Click Sign Up. You can now view and download portions of your medical record. 10. Click the Download Summary menu link to download a portable copy of your medical information. Additional Information If you have questions, please visit the Frequently Asked Questions section of the [employer] website at [email or web address] Remember, [employer] is NOT to be used for urgent needs. For medical emergencies, dial 911. DISCHARGE SUMMARY from Nurse PATIENT INSTRUCTIONS: 
 
 After general anesthesia or intravenous sedation, for 24 hours or while taking prescription Narcotics: · Limit your activities · Do not drive and operate hazardous machinery · Do not make important personal or business decisions · Do  not drink alcoholic beverages · If you have not urinated within 8 hours after discharge, please contact your surgeon on call. Report the following to your surgeon: 
· Excessive pain, swelling, redness or odor of or around the surgical area · Temperature over 100.5 · Nausea and vomiting lasting longer than 4 hours or if unable to take medications · Any signs of decreased circulation or nerve impairment to extremity: change in color, persistent  numbness, tingling, coldness or increase pain · Any questions What to do at Home: *  Please give a list of your current medications to your Primary Care Provider. *  Please update this list whenever your medications are discontinued, doses are 
    changed, or new medications (including over-the-counter products) are added. *  Please carry medication information at all times in case of emergency situations. These are general instructions for a healthy lifestyle: No smoking/ No tobacco products/ Avoid exposure to second hand smoke Surgeon General's Warning:  Quitting smoking now greatly reduces serious risk to your health. Obesity, smoking, and sedentary lifestyle greatly increases your risk for illness A healthy diet, regular physical exercise & weight monitoring are important for maintaining a healthy lifestyle You may be retaining fluid if you have a history of heart failure or if you experience any of the following symptoms:  Weight gain of 3 pounds or more overnight or 5 pounds in a week, increased swelling in our hands or feet or shortness of breath while lying flat in bed. Please call your doctor as soon as you notice any of these symptoms; do not wait until your next office visit. Recognize signs and symptoms of STROKE: 
 
F-face looks uneven A-arms unable to move or move unevenly S-speech slurred or non-existent T-time-call 911 as soon as signs and symptoms begin-DO NOT go Back to bed or wait to see if you get better-TIME IS BRAIN. Warning Signs of HEART ATTACK Call 911 if you have these symptoms: 
? Chest discomfort. Most heart attacks involve discomfort in the center of the chest that lasts more than a few minutes, or that goes away and comes back. It can feel like uncomfortable pressure, squeezing, fullness, or pain. ? Discomfort in other areas of the upper body. Symptoms can include pain or discomfort in one or both arms, the back, neck, jaw, or stomach. ? Shortness of breath with or without chest discomfort. ? Other signs may include breaking out in a cold sweat, nausea, or lightheadedness. Don't wait more than five minutes to call [address]! Fast action can save your life. Calling 911 is almost always the fastest way to get lifesaving treatment. Emergency Medical Services staff can begin treatment when they arrive  up to an hour sooner than if someone gets to the hospital by car. The discharge information has been reviewed with the patient. The patient verbalized understanding. Discharge medications reviewed with the patient and appropriate educational materials and side effects teaching were provided. ___________________________________________________________________________________________________________________________________ [employer] Announcement We are excited to announce that we are making your provider's discharge notes available to you in [employer]. You will see these notes when they are completed and signed by the physician that discharged you from your recent hospital stay.   If you have any questions or concerns about any information you see in [employer], please call the [facility] Department where you were seen or reach out to your Primary Care Provider for more information about your plan of care. Introducing [facility]! Net[employer] Cel[employer] introduces [employer] patient portal. Now you can access parts of your medical record, email your doctor's office, and request medication refills online. 1. In your internet browser, go to [email or web address] 2. Click on the First Time User? Click Here link in the Sign In box. You will see the New Member Sign Up page. 3. Enter your [employer] Access Code exactly as it appears below. You will not need to use this code after youve completed the sign-up process. If you do not sign up before the expiration date, you must request a new code. · [employer] Access Code: [email or web address] Expires: 5/16/2018  3:14 PM 
 
4. Enter the last four digits of your Social Security Number (xxxx) and Date of Birth (mm/dd/yyyy) as indicated and click Submit. You will be taken to the next sign-up page. 5. Create a [employer] ID. This will be your [employer] login ID and cannot be changed, so think of one that is secure and easy to remember. 6. Create a [employer] password. You can change your password at any time. 7. Enter your Password Reset Question and Answer. This can be used at a later time if you forget your password. 8. Enter your e-mail address. You will receive e-mail notification when new information is available in [address] 9. Click Sign Up. You can now view and download portions of your medical record. 10. Click the Download Summary menu link to download a portable copy of your medical information. If you have questions, please visit the Frequently Asked Questions section of the [employer] website. Remember, [employer] is NOT to be used for urgent needs. For medical emergencies, dial 911. Now available from your iPhone and Android! Providers Seen During Your Hospitalization Provider Specialty Primary office phone Allison Sesay, [address] [facility] Emergency Medicine [phone] Erika Pete MD Internal Medicine [phone] Anais Miles MD Internal Medicine [phone] Kelvin Sesay MD [facility] [phone] Dimitry Stearns MD Internal Medicine [phone] Sharry Castleman, MD Internal Medicine [phone] Your Primary Care Physician (PCP) Primary Care Physician Office Phone Office Fax NONE ** None ** ** None ** You are allergic to the following No active allergies Recent Documentation Height Weight BMI Smoking Status 1.803 m 96.5 kg 29.68 kg/m2 Former Smoker Emergency Contacts Name Discharge Info Relation Home Work Mobile Greg Cerda N/A  AT THIS TIME [6] Friend [5] [phone] Patient Belongings The following personal items are in your possession at time of discharge: 
  Dental Appliances: None  Visual Aid: None      Home Medications: None   Jewelry: None  Clothing: Shirt, Pants, Footwear    Other Valuables: Cell Phone Please provide this summary of care documentation to your next provider. Signatures-by signing, you are acknowledging that this After Visit Summary has been reviewed with you and you have received a copy. Patient Signature:  ____________________________________________________________ Date:  ____________________________________________________________  
  
[facility] Provider Signature:  ____________________________________________________________ Date:  ____________________________________________________________

## 2018-02-13 NOTE — IP AVS SNAPSHOT
Summary of Care Report The Summary of Care report has been created to help improve care coordination. Users with access to [employer] or [facility] (Web-based application) may access additional patient information including the Discharge Summary. If you are not currently a [address] user and need more information, please call the number listed below in the [place] [address] section and ask to be connected with Medical Records. Facility Information Name Address Phone [address] [facility] [phone] [phone] Patient Information Patient Name Sex PHONG Bucio ([id number]) Male 1970 Discharge Information Admitting Provider Service Area Unit Keke Paula MD / [address] Surgical Unit / [phone] Discharge Provider Discharge Date/Time Discharge Disposition Destination (none) 2018 (Pending) LORRI (none) Patient Language Language ENGLISH [13] Hospital Problems as of 2018  Reviewed: 2018 12:01 PM by Kylee Salas NP Class Noted - Resolved Last Modified POA Active Problems Left-sided weakness  2018 - Present 2018 by Kylee Salas NP Unknown Entered by Keke Paula MD  
  Acute CVA (cerebrovascular accident) [facility])  2018 - Present 2018 by Kylee Salas NP Unknown Entered by Keke Paula MD  
  
Non-Hospital Problems as of 2018  Reviewed: 2018 12:01 PM by Kylee Salas NP None You are allergic to the following No active allergies Current Discharge Medication List  
  
START taking these medications Dose & Instructions Dispensing Information Comments  
 amLODIPine 10 mg tablet Commonly known as:  Niles Arreaga Start taking on:  2018 Dose:  10 mg Take 1 Tab by mouth daily. Quantity:  30 Tab Refills:  0  
   
 aspirin 81 mg chewable tablet Start taking on:  2/27/2018 Dose:  81 mg Take 1 Tab by mouth daily. Quantity:  30 Tab Refills:  0  
   
 atorvastatin 20 mg tablet Commonly known as:  LIPITOR Dose:  20 mg Take 1 Tab by mouth nightly. Quantity:  30 Tab Refills:  0  
   
 carvedilol 12.5 mg tablet Commonly known as:  Maria L Jerman Dose:  12.5 mg Take 1 Tab by mouth two (2) times a day. Quantity:  60 Tab Refills:  0  
   
 docusate sodium 100 mg capsule Commonly known as:  Alba Palin Dose:  100 mg Take 1 Cap by mouth two (2) times a day for 90 days. Quantity:  60 Cap Refills:  2  
   
 lisinopril 20 mg tablet Commonly known as:  Marlane Jenny Start taking on:  2/27/2018 Dose:  20 mg Take 1 Tab by mouth daily. Quantity:  30 Tab Refills:  0 Follow-up Information Follow up With Details Comments Contact [address] In 1 day Home Health for PT after discharge from [address] [address] 
[phone] [address] [place] [address] 
[phone] [facility] [address] [facility]. On 3/19/2018 Financial Screening Appointment:  [facility] financial screening appt on Monday, 3/19/18 @ 1:00 p.m. Patient needs to provide completed application, photo ID and proof of income. [address] [address] 
[phone] Discharge Instructions Discharge Summary  
  
  
PATIENT ID: Noemi Vitale MRN: [id number] YOB: 1970 DATE OF ADMISSION: 2/13/2018 12:39 PM   
DATE OF DISCHARGE: 2/26/2018 PRIMARY CARE PROVIDER: None  
  
ATTENDING PHYSICIAN: Dr. Antoine Mratino DISCHARGING PROVIDER: Lexie Andrews NP To contact this individual call [phone] and ask the  to page. If unavailable ask to be transferred the Adult Hospitalist Department. 
  
CONSULTATIONS: IP CONSULT TO CARDIOLOGY 
IP CONSULT TO NEUROLOGY 
  
PROCEDURES/SURGERIES: * No surgery found * 
  
[address] COURSE:  
Mr. Yeny Nelson is a 49 y/o right handed AA male who presented on 2/13 with uncontrolled hypertension, left arm/lefg weakness. Pt has not seen PCP or taken medications for htn in several years due to lack of medical insurance. Pt had a CT of the head revealing multiple hypodensities concerning for prior infarction, though the patient denied any known history of stroke. MRI brain  confirmed acute right periventricular white matter ischemic CVA.  Multiple old lacunar infarcts in bilateral cerebral white matter, alex, and cerebellum. Multiple punctate areas of hemosiderin deposit. VSS, pt stable for rehab. MUST have follow up with [facility] and take blood pressure mediations as directed.  
  
DISCHARGE DIAGNOSES / PLAN:   
  
Acute Right Periventricular white matter ischemic CVA 
- not a candidate for tPA on admission per neurology - CTA head/neck- Multifocal areas of presumably remote ischemia in the basal ganglia. - MRI showed acute nonhemorrhagic right deep cerebral white matter infarct/ischemia and extensive chronic ischemic findings.  
- evaluated by neurology, c/w htn control, asa, statin  
- pt/ot 
  
Hypertensive Urgency: Secondary to medication non compliance - Improved 
- has not seen a PCP in years, no insurance. [facility] f[facility] - BP has improved, even on the lower side at times. - Continue Norvasc 10 mg PO daily, coreg 12.5 mg BID. Lisinopril 20 mg daily 
- Goal SBP < 130/80 mm hg 
- low salt diet 
   
Acute renal insufficiency: RESOVLED Probably multifactorial including ACEi and inadequate PO intake.  
  
Abnormal EKG/Elevated troponin:  
- CT chest/abd/pel without acute finding. Appreciated cardiology input. - ECHO with normal EF and LVH 
- non specific elevated troponin likely 2/2 htn 
  
DM2 
- a1c 6.6 
   
Dyslipidemia - , started on atorvastatin 20mg daily, will need fasting lipids and CMP checked in 6-8 weeks 
  
   
VTE prophylaxis: SCD Code status: Full Discussed plan of care with Patient/Family and Nurse. Pre-admission lived at home.   
  
  
PENDING TEST RESULTS:  
At the time of discharge the following test results are still pending: none 
  
FOLLOW UP APPOINTMENTS:   
Follow-up Information Follow up With Details Comments Contact Info  
  Danyell Serrano NP On 3/15/2018 Hospital follow up PCP appointment from Rehab for Thursday, 3/15/18 @ 1:00 p.m. Patient needs to arrive 15 minutes early, with photo ID, insurance card and a listing of all medications. [address] [facility] [phone] [facility] In 1 day Home Health for PT after discharge from [facility] 
[address]st [address] [facility]    [phone] [address] [facility]   Financial Screening Appointment:  Corinne Soto [address] [facility] [address] [phone]  
  
  
  
ADDITIONAL CARE RECOMMENDATIONS: As above 
  
DIET: Cardiac 
  
ACTIVITY: per rehab 
  
WOUND CARE: none 
  
EQUIPMENT needed: none 
  
  
DISCHARGE MEDICATIONS: 
   
Current Discharge Medication List  
   
   
START taking these medications  
  Details  
amLODIPine (NORVASC) 10 mg tablet Take 1 Tab by mouth daily. Qty: 30 Tab, Refills: 0  
   
carvedilol (COREG) 12.5 mg tablet Take 1 Tab by mouth two (2) times a day. Qty: 60 Tab, Refills: 0  
   
lisinopril (PRINIVIL, ZESTRIL) 20 mg tablet Take 1 Tab by mouth daily. Qty: 30 Tab, Refills: 0  
   
docusate sodium (COLACE) 100 mg capsule Take 1 Cap by mouth two (2) times a day for 90 days. Qty: 60 Cap, Refills: 2  
   
atorvastatin (LIPITOR) 20 mg tablet Take 1 Tab by mouth nightly. Qty: 30 Tab, Refills: 0  
   
aspirin 81 mg chewable tablet Take 1 Tab by mouth daily. Qty: 30 Tab, Refills: 0  
   
   
  
  
  
NOTIFY YOUR PHYSICIAN FOR ANY OF THE FOLLOWING:  
Fever over 101 degrees for 24 hours. Chest pain, shortness of breath, fever, chills, nausea, vomiting, diarrhea, change in mentation, falling, weakness, bleeding. Severe pain or pain not relieved by medications. Or, any other signs or symptoms that you may have questions about. 
  
DISPOSITION: 
   Home With: 
  OT   PT   HH   RN  
  
xx Long term SNF/Inpatient Rehab  
  Independent/assisted living  
  Hospice  
  Other:  
  
  
PATIENT CONDITION AT DISCHARGE:  
  
Functional status  
  Poor   
xx Deconditioned   
  Independent   
  
Cognition  
 xx Lucid   
  Forgetful   
  Dementia   
  
Catheters/lines (plus indication)  
  Braden   
  PICC   
  PEG   
xx None   
  
Code status  
 xx Full code   
  DNR   
  
PHYSICAL EXAMINATION AT DISCHARGE: 
General:  Alert, cooperative, no distress, appears stated age. He is comfortably lying in the bed. Lungs:   Clear to auscultation bilaterally. Chest wall:  No tenderness or deformity. Heart:  Regular rate and rhythm, S1, S2 normal, no murmur. Abdomen:   Soft, non-tender. Bowel sounds normal.   
Extremities: Extremities normal, atraumatic, no cyanosis or edema. Pulses: 2+ and symmetric all extremities. Skin: Skin color, texture, turgor normal. No rashes or lesions Neurologic:  AA&Ox3, speech not slurred. Normal memory. No facial asymmetry. EOMI, + dsymetria with FTN on left. LLE strength is 4/5  
  
  
CHRONIC MEDICAL DIAGNOSES: 
Problem List as of 2/26/2018  Date Reviewed: 2/26/2018  
          Codes Class Noted - Resolved  
  Left-sided weakness ICD-10-CM: R53.1 ICD-9-CM: 728.87   2/13/2018 - Present  
     
  [facility] ICD-10-CM: I63.9 ICD-9-CM: 434.91   2018 - Present  
     
  RESOLVED: Hypertensive urgency ICD-10-CM: I16.0 ICD-9-CM: 739. 9   2018 - 2018  
     
  RESOLVED: Abnormal EKG ICD-10-CM: R94.31 
ICD-9-CM: 794.31   2018 - 2018  
     
  
  
  
Greater than 30 minutes were spent with the patient on counseling and coordination of care 
  
Signed:  
Marisa Chun NP 
2018 11:55 AM 
   
   
  
     
   
 
MyChart Activation Thank you for requesting access to [employer]. Please follow the instructions below to securely access and download your online medical record. [employer] allows you to send messages to your doctor, view your test results, renew your prescriptions, schedule appointments, and more. How Do I Sign Up? 1. In your internet browser, go to [email or web address] 
2. Click on the First Time User? Click Here link in the Sign In box. You will be redirect to the New Member Sign Up page. 3. Enter your [employer] Access Code exactly as it appears below. You will not need to use this code after youve completed the sign-up process. If you do not sign up before the expiration date, you must request a new code. [employer] Access Code: [email or web address] Expires: 2018  3:14 PM (This is the date your [employer]iCall access code will ) 4. Enter the last four digits of your Social Security Number (xxxx) and Date of Birth (mm/dd/yyyy) as indicated and click Submit. You will be taken to the next sign-up page. 5. Create a Y[employer]iCall ID. This will be your [employer] login ID and cannot be changed, so think of one that is secure and easy to remember. 6. Create a [employer]Call password. You can change your password at any time. 7. Enter your Password Reset Question and Answer. This can be used at a later time if you forget your password. 8. Enter your e-mail address. You will receive e-mail notification when new information is available in [address] 9. Click Sign Up. You can now view and download portions of your medical record. 10. Click the Download Summary menu link to download a portable copy of your medical information. Additional Information If you have questions, please visit the Frequently Asked Questions section of the [employer] website at [email or web address] Remember, MyChart is NOT to be used for urgent needs. For medical emergencies, dial 911. DISCHARGE SUMMARY from Nurse PATIENT INSTRUCTIONS: 
 
After general anesthesia or intravenous sedation, for 24 hours or while taking prescription Narcotics: · Limit your activities · Do not drive and operate hazardous machinery · Do not make important personal or business decisions · Do  not drink alcoholic beverages · If you have not urinated within 8 hours after discharge, please contact your surgeon on call. Report the following to your surgeon: 
· Excessive pain, swelling, redness or odor of or around the surgical area · Temperature over 100.5 · Nausea and vomiting lasting longer than 4 hours or if unable to take medications · Any signs of decreased circulation or nerve impairment to extremity: change in color, persistent  numbness, tingling, coldness or increase pain · Any questions What to do at Home: *  Please give a list of your current medications to your Primary Care Provider. *  Please update this list whenever your medications are discontinued, doses are 
    changed, or new medications (including over-the-counter products) are added. *  Please carry medication information at all times in case of emergency situations. These are general instructions for a healthy lifestyle: No smoking/ No tobacco products/ Avoid exposure to second hand smoke Surgeon General's Warning:  Quitting smoking now greatly reduces serious risk to your health. Obesity, smoking, and sedentary lifestyle greatly increases your risk for illness A healthy diet, regular physical exercise & weight monitoring are important for maintaining a healthy lifestyle You may be retaining fluid if you have a history of heart failure or if you experience any of the following symptoms:  Weight gain of 3 pounds or more overnight or 5 pounds in a week, increased swelling in our hands or feet or shortness of breath while lying flat in bed. Please call your doctor as soon as you notice any of these symptoms; do not wait until your next office visit. Recognize signs and symptoms of STROKE: 
 
F-face looks uneven A-arms unable to move or move unevenly S-speech slurred or non-existent T-time-call 911 as soon as signs and symptoms begin-DO NOT go Back to bed or wait to see if you get better-TIME IS BRAIN. Warning Signs of HEART ATTACK Call 911 if you have these symptoms: 
? Chest discomfort. Most heart attacks involve discomfort in the center of the chest that lasts more than a few minutes, or that goes away and comes back. It can feel like uncomfortable pressure, squeezing, fullness, or pain. ? Discomfort in other areas of the upper body. Symptoms can include pain or discomfort in one or both arms, the back, neck, jaw, or stomach. ? Shortness of breath with or without chest discomfort. ? Other signs may include breaking out in a cold sweat, nausea, or lightheadedness. Don't wait more than five minutes to call [address]! Fast action can save your life. Calling 911 is almost always the fastest way to get lifesaving treatment. Emergency Medical Services staff can begin treatment when they arrive  up to an hour sooner than if someone gets to the hospital by car. The discharge information has been reviewed with the patient. The patient verbalized understanding. Discharge medications reviewed with the patient and appropriate educational materials and side effects teaching were provided. ___________________________________________________________________________________________________________________________________ Chart Review Routing History No Routing History on File

## 2018-02-13 NOTE — ROUTINE PROCESS
TRANSFER - IN REPORT:    Verbal report received from Selam(name) on Malinda Reese  being received from ED(unit) for routine progression of care      Report consisted of patients Situation, Background, Assessment and   Recommendations(SBAR). Information from the following report(s) SBAR, ED Summary, MAR, Recent Results and Cardiac Rhythm NSR was reviewed with the receiving nurse. Opportunity for questions and clarification was provided. Assessment completed upon patients arrival to unit and care assumed.
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
## 2018-02-13 NOTE — PROGRESS NOTES
Spiritual Care Assessment/Progress Notes    John Duncan [id number]  [id number]    1970  50 y.o.  male    Patient Telephone Number: [phone] (home)   Druze Affiliation: Buddhist   Language: English   Extended Emergency Contact Information  Primary Emergency Contact: Jacinda Phone: [phone]  Relation: Friend   Patient Active Problem List    Diagnosis Date Noted    Left-sided weakness 02/13/2018    Hypertensive urgency 02/13/2018        Date: 2/13/2018       Level of Druze/Spiritual Activity:  []         Involved in shelli tradition/spiritual practice    []         Not involved in shelli tradition/spiritual practice  []         Spiritually oriented    []         Claims no spiritual orientation    []         seeking spiritual identity  []         Feels alienated from [facility]aritan practice/tradition  []         Feels angry about [facility] practice/tradition  []         Spirituality/[facility]ar[facility] tradition is a resource for coping at this time.   []         Not able to assess due to medical condition    Services Provided Today:  []         crisis intervention    []         reading Scriptures  []         spiritual assessment    []         prayer  []         empathic listening/emotional support  []         rites and rituals (cite in comments)  []         life review     []         Samaritan support  []         theological development   []         advocacy  []         ethical dialog     []         blessing  []         bereavement support    []         support to family  []         anticipatory grief support   []         help with AMD  []         spiritual guidance    []         meditation      Spiritual Care Needs  []         Emotional Support  []         Spiritual/Druze Care  []         Loss/Adjustment  []         Advocacy/Referral                /Ethics  [x]         No needs expressed at               this time  []         Other: (note in comments)  Spiritual Care Plan  []         Follow up visits with               pt/family  []         Provide materials  []         Schedule sacraments  []         Contact Community               Clergy  [x]         Follow up as needed  []         Other: (note in               comments)     Comments:  is attempting response to Code S in ER 25. Pt is unavailable and is busy with clinical staff at the time. Spiritual care will continue to follow as able and as needed.      [id number] Fadi Do M.Div, M.S, Ilda 607 available at [phone]([phone])

## 2018-02-13 NOTE — IP AVS SNAPSHOT
[facility] [address] [place] [address] 
[phone] Patient: Daniel Caban MRN: [id number] RFX:6/8/[id number] A check garry indicates which time of day the medication should be taken. My Medications START taking these medications Instructions Each Dose to Equal  
 Morning Noon Evening Bedtime  
 amLODIPine 10 mg tablet Commonly known as:  Josr Crowe Start taking on:  2/27/2018 Your last dose was: Your next dose is: Take 1 Tab by mouth daily. 10 mg  
    
   
   
   
  
 aspirin 81 mg chewable tablet Start taking on:  2/27/2018 Your last dose was: Your next dose is: Take 1 Tab by mouth daily. 81 mg  
    
   
   
   
  
 atorvastatin 20 mg tablet Commonly known as:  LIPITOR Your last dose was: Your next dose is: Take 1 Tab by mouth nightly. 20 mg  
    
   
   
   
  
 carvedilol 12.5 mg tablet Commonly known as:  [place] [employer] Your last dose was: Your next dose is: Take 1 Tab by mouth two (2) times a day. 12.5 mg  
    
   
   
   
  
 docusate sodium 100 mg capsule Commonly known as:  Dipesh Galvez Your last dose was: Your next dose is: Take 1 Cap by mouth two (2) times a day for 90 days. 100 mg  
    
   
   
   
  
 lisinopril 20 mg tablet Commonly known as:  Malgorzata Olsen Start taking on:  2/27/2018 Your last dose was: Your next dose is: Take 1 Tab by mouth daily. 20 mg Where to Get Your Medications Information on where to get these meds will be given to you by the nurse or doctor. ! Ask your nurse or doctor about these medications  
  amLODIPine 10 mg tablet  
 aspirin 81 mg chewable tablet  
 atorvastatin 20 mg tablet  
 carvedilol 12.5 mg tablet  
 docusate sodium 100 mg capsule  
 lisinopril 20 mg tablet

## 2018-02-13 NOTE — ED NOTES
Patient resting comfortably, monitoring continued. Patient updated as to plan of care, awaiting inpatient bed. Call bell within reach.

## 2018-02-13 NOTE — Clinical Note
Patient Class[de-identified] Observation [845] Type of Bed: Neuro/Stroke [9] Reason for Observation: left sided weakness, TIA, HTN urgency Admitting Diagnosis: Left-sided weakness [1423000] Admitting Diagnosis: Hypertensive urgency [4885401] Admitting Physician: Kizzy ACE [[id number]] Attending Physician: Kizzy ACE [[id number]]
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
## 2018-02-13 NOTE — ED TRIAGE NOTES
Per EMS pt is here due to lower back pain. Pt presents to triage with left upper and left lower extremity pain and weakness since last night @ 1900. Pt blood pressure 222/130. Pt states he has been out of his BP medications.

## 2018-02-13 NOTE — ED NOTES
Patient resting comfortably in stretcher, requesting meal tray. Monitoring continues, call phan within reach. Tray ordered.

## 2018-02-14 ENCOUNTER — APPOINTMENT (OUTPATIENT)
Dept: MRI IMAGING | Age: 48
DRG: 065 | End: 2018-02-14
Attending: HOSPITALIST
Payer: SUBSIDIZED

## 2018-02-14 LAB
AMPHET UR QL SCN: NEGATIVE
ANION GAP SERPL CALC-SCNC: 10 MMOL/L (ref 5–15)
ATRIAL RATE: 82 BPM
BARBITURATES UR QL SCN: NEGATIVE
BENZODIAZ UR QL: NEGATIVE
BUN SERPL-MCNC: 9 MG/DL (ref 6–20)
BUN/CREAT SERPL: 9 (ref 12–20)
CALCIUM SERPL-MCNC: 8.5 MG/DL (ref 8.5–10.1)
CALCULATED P AXIS, ECG09: 66 DEGREES
CALCULATED R AXIS, ECG10: -3 DEGREES
CALCULATED T AXIS, ECG11: 179 DEGREES
CANNABINOIDS UR QL SCN: NEGATIVE
CHLORIDE SERPL-SCNC: 104 MMOL/L (ref 97–108)
CHOLEST SERPL-MCNC: 263 MG/DL
CO2 SERPL-SCNC: 24 MMOL/L (ref 21–32)
COCAINE UR QL SCN: NEGATIVE
CREAT SERPL-MCNC: 0.96 MG/DL (ref 0.7–1.3)
DIAGNOSIS, 93000: NORMAL
DRUG SCRN COMMENT,DRGCM: NORMAL
EST. AVERAGE GLUCOSE BLD GHB EST-MCNC: 143 MG/DL
GLUCOSE SERPL-MCNC: 112 MG/DL (ref 65–100)
HBA1C MFR BLD: 6.6 % (ref 4.2–6.3)
HDLC SERPL-MCNC: 53 MG/DL
HDLC SERPL: 5 {RATIO} (ref 0–5)
LDLC SERPL CALC-MCNC: 172.4 MG/DL (ref 0–100)
LIPID PROFILE,FLP: ABNORMAL
MAGNESIUM SERPL-MCNC: 1.9 MG/DL (ref 1.6–2.4)
METHADONE UR QL: NEGATIVE
OPIATES UR QL: NEGATIVE
P-R INTERVAL, ECG05: 132 MS
PCP UR QL: NEGATIVE
POTASSIUM SERPL-SCNC: 3.5 MMOL/L (ref 3.5–5.1)
Q-T INTERVAL, ECG07: 408 MS
QRS DURATION, ECG06: 94 MS
QTC CALCULATION (BEZET), ECG08: 476 MS
SODIUM SERPL-SCNC: 138 MMOL/L (ref 136–145)
TRIGL SERPL-MCNC: 188 MG/DL (ref ?–150)
TROPONIN I SERPL-MCNC: 0.06 NG/ML
TSH SERPL DL<=0.05 MIU/L-ACNC: 1.42 UIU/ML (ref 0.36–3.74)
VENTRICULAR RATE, ECG03: 82 BPM
VLDLC SERPL CALC-MCNC: 37.6 MG/DL

## 2018-02-14 PROCEDURE — 84484 ASSAY OF TROPONIN QUANT: CPT | Performed by: NURSE PRACTITIONER

## 2018-02-14 PROCEDURE — 65660000000 HC RM CCU STEPDOWN

## 2018-02-14 PROCEDURE — 84443 ASSAY THYROID STIM HORMONE: CPT | Performed by: HOSPITALIST

## 2018-02-14 PROCEDURE — 74011250637 HC RX REV CODE- 250/637: Performed by: HOSPITALIST

## 2018-02-14 PROCEDURE — 97110 THERAPEUTIC EXERCISES: CPT

## 2018-02-14 PROCEDURE — 74011250637 HC RX REV CODE- 250/637: Performed by: INTERNAL MEDICINE

## 2018-02-14 PROCEDURE — 93005 ELECTROCARDIOGRAM TRACING: CPT

## 2018-02-14 PROCEDURE — 83735 ASSAY OF MAGNESIUM: CPT | Performed by: NURSE PRACTITIONER

## 2018-02-14 PROCEDURE — 80048 BASIC METABOLIC PNL TOTAL CA: CPT | Performed by: NURSE PRACTITIONER

## 2018-02-14 PROCEDURE — 97165 OT EVAL LOW COMPLEX 30 MIN: CPT

## 2018-02-14 PROCEDURE — 70551 MRI BRAIN STEM W/O DYE: CPT

## 2018-02-14 PROCEDURE — 36415 COLL VENOUS BLD VENIPUNCTURE: CPT | Performed by: HOSPITALIST

## 2018-02-14 PROCEDURE — 80307 DRUG TEST PRSMV CHEM ANLYZR: CPT | Performed by: PSYCHIATRY & NEUROLOGY

## 2018-02-14 PROCEDURE — 83036 HEMOGLOBIN GLYCOSYLATED A1C: CPT | Performed by: HOSPITALIST

## 2018-02-14 PROCEDURE — 80061 LIPID PANEL: CPT | Performed by: NURSE PRACTITIONER

## 2018-02-14 PROCEDURE — 74011250636 HC RX REV CODE- 250/636: Performed by: HOSPITALIST

## 2018-02-14 PROCEDURE — 97162 PT EVAL MOD COMPLEX 30 MIN: CPT

## 2018-02-14 PROCEDURE — 74011000250 HC RX REV CODE- 250: Performed by: HOSPITALIST

## 2018-02-14 PROCEDURE — 92610 EVALUATE SWALLOWING FUNCTION: CPT

## 2018-02-14 PROCEDURE — 74011250637 HC RX REV CODE- 250/637: Performed by: NURSE PRACTITIONER

## 2018-02-14 RX ORDER — CARVEDILOL 12.5 MG/1
12.5 TABLET ORAL 2 TIMES DAILY
Status: DISCONTINUED | OUTPATIENT
Start: 2018-02-14 | End: 2018-02-15

## 2018-02-14 RX ORDER — ATORVASTATIN CALCIUM 20 MG/1
20 TABLET, FILM COATED ORAL
Status: DISCONTINUED | OUTPATIENT
Start: 2018-02-14 | End: 2018-02-26 | Stop reason: HOSPADM

## 2018-02-14 RX ORDER — METOPROLOL TARTRATE 25 MG/1
12.5 TABLET, FILM COATED ORAL 2 TIMES DAILY
Status: DISCONTINUED | OUTPATIENT
Start: 2018-02-14 | End: 2018-02-14

## 2018-02-14 RX ORDER — LISINOPRIL 20 MG/1
20 TABLET ORAL DAILY
Status: DISCONTINUED | OUTPATIENT
Start: 2018-02-14 | End: 2018-02-14

## 2018-02-14 RX ORDER — LISINOPRIL 20 MG/1
20 TABLET ORAL DAILY
Status: DISCONTINUED | OUTPATIENT
Start: 2018-02-15 | End: 2018-02-15

## 2018-02-14 RX ADMIN — METOPROLOL TARTRATE 12.5 MG: 25 TABLET ORAL at 13:29

## 2018-02-14 RX ADMIN — CARVEDILOL 12.5 MG: 12.5 TABLET, FILM COATED ORAL at 21:05

## 2018-02-14 RX ADMIN — SODIUM CHLORIDE 5 MG/HR: 900 INJECTION, SOLUTION INTRAVENOUS at 02:53

## 2018-02-14 RX ADMIN — ASPIRIN 81 MG 81 MG: 81 TABLET ORAL at 08:04

## 2018-02-14 RX ADMIN — DOCUSATE SODIUM 100 MG: 100 CAPSULE, LIQUID FILLED ORAL at 08:04

## 2018-02-14 RX ADMIN — FAMOTIDINE 20 MG: 20 TABLET, FILM COATED ORAL at 21:05

## 2018-02-14 RX ADMIN — ATORVASTATIN CALCIUM 20 MG: 40 TABLET, FILM COATED ORAL at 21:05

## 2018-02-14 RX ADMIN — DOCUSATE SODIUM 100 MG: 100 CAPSULE, LIQUID FILLED ORAL at 18:40

## 2018-02-14 RX ADMIN — SODIUM CHLORIDE 75 ML/HR: 900 INJECTION, SOLUTION INTRAVENOUS at 08:05

## 2018-02-14 RX ADMIN — ACETAMINOPHEN 650 MG: 325 TABLET, FILM COATED ORAL at 14:55

## 2018-02-14 RX ADMIN — SODIUM CHLORIDE 5 MG/HR: 900 INJECTION, SOLUTION INTRAVENOUS at 09:10

## 2018-02-14 RX ADMIN — FAMOTIDINE 20 MG: 20 TABLET, FILM COATED ORAL at 08:04

## 2018-02-14 NOTE — ROUTINE PROCESS
Primary Nurse [address], RN performed a dual skin assessment on this patient No impairment noted  Wero score is 21

## 2018-02-14 NOTE — PROGRESS NOTES
Bedside shift change report given to Jeffery Tucker RN (oncoming nurse) by Pippa Hastings RN (offgoing nurse). Report included the following information SBAR, Kardex, ED Summary, Procedure Summary, Intake/Output, Accordion, Recent Results, Med Rec Status and Cardiac Rhythm NSR.

## 2018-02-14 NOTE — PROGRESS NOTES
Problem: Self Care Deficits Care Plan (Adult)  Goal: *Acute Goals and Plan of Care (Insert Text)  Occupational Therapy Goals  Initiated 2/14/2018   1. Patient will perform lower body dressing with independence within 7 day(s). 2.  Patient will perform bathing with modified independence within 7 day(s). 3.  Patient will perform upper body ADLs standing at sink for 5 minutes without signs of fatigue or LOB within 7 day(s). 4.  Patient will perform toilet transfers with independence within 7 day(s). 5.  Patient will perform all aspects of toileting with independence within 7 day(s). 6.  Patient will participate in upper extremity therapeutic exercise/activities with independence for 10 minutes within 7 day(s). 7.  Patient will utilize energy conservation techniques during functional activities with verbal cues within 7 day(s). Occupational Therapy EVALUATION  Patient: Becca Rees (50 y.o. male)  Date: 2/14/2018  Primary Diagnosis: Left-sided weakness  Hypertensive urgency  Acute CVA (cerebrovascular accident) ([facility] [address]  Hypertensive urgency  Abnormal EKG        Precautions: Falls        ASSESSMENT :  Based on the objective data described below, the patient presents with overall up to SUP for functional mobility, up to IND for upper body ADLs and min A to IND for lower body ADLs. Patient received lying supine in bed and agreeable to OT evaluation. Patient able to sit EOB with SUP, noted moderate difficulty moving from supine to sit d/t LLE weakness and patient needing to use BUE to lift and move LLE effectively. Patient noted to have fair sitting balance, leaning B elbows on knees throughout evaluation. Patient noted to have mild weakness in LUE. No noted visual deficits.  Patient demonstrating deficits in 50% of ADLs, primarily functional mobility and lower extremity ADLs,  and would benefit from continued rehab in order to improve his ability to care for himself and complete functional mobility safely. Patient has a limited support system and will be required to be IND before returning home (patient working), patient is an excellent candidate for IP rehab as he is motivated to return to [facility] and will be able to tolerate 3hrs/day of therapy. Patient will benefit from skilled intervention to address the above impairments. Patients rehabilitation potential is considered to be Good  Factors which may influence rehabilitation potential include:   []                None noted  []                Mental ability/status  []                Medical condition  []                Home/family situation and support systems  []                Safety awareness  []                Pain tolerance/management  []                Other:      PLAN :  Recommendations and Planned Interventions:  [x]                  Self Care Training                  [x]           Therapeutic Activities  [x]                  Functional Mobility Training    []           Cognitive Retraining  [x]                  Therapeutic Exercises           [x]           Endurance Activities  [x]                  Balance Training                   [x]           Neuromuscular Re-Education  []                  Visual/Perceptual Training     [x]      Home Safety Training  [x]                  Patient Education                 [x]           Family Training/Education  []                  Other (comment):    Frequency/Duration: Patient will be followed by occupational therapy 5 times a week to address goals. Discharge Recommendations: Inpatient Rehab  Further Equipment Recommendations for Discharge: TBD by rehab      SUBJECTIVE:   Patient stated Jules Saraas my left leg is weak.     OBJECTIVE DATA SUMMARY:   HISTORY:   Past Medical History:   Diagnosis Date    Hypertension    History reviewed. No pertinent surgical history. Prior Level of Function/Environment/Context: Patient reports he lives in 2 level home with room mate and was IND prior to admission.  Patient reports his room mate can assist PRN but patient does not have any other family in area. Patient reports he does not have any AE/DME/AD  Occupations in which the patient is/was successful, what are the barriers preventing that success:   Performance Patterns (routines, roles, habits, and rituals):   Personal Interests and/or values:   Expanded or extensive additional review of patient history:     Home Situation  Home Environment: Private residence  # Steps to Enter: 6  Rails to Enter: Yes  One/Two Story Residence: Two story  # of Interior Steps: 14  Living Alone: No  Support Systems: Family member(s)  Patient Expects to be Discharged to[de-identified] Private residence  Current DME Used/Available at Home: None  Tub or Shower Type: Tub  []  Right hand dominant   []  Left hand dominant    EXAMINATION OF PERFORMANCE DEFICITS:  Cognitive/Behavioral Status:  Neurologic State: Alert  Orientation Level: Oriented X4  Cognition: Appropriate for age attention/concentration; Follows commands  Perception: Appears intact  Perseveration: No perseveration noted  Safety/Judgement: Awareness of environment; Insight into deficits; Fall prevention    Skin: Appears grossly intact    Edema: None noted in BUEs    Hearing: Auditory  Auditory Impairment: None    Vision/Perceptual:    Tracking: Able to track stimulus in all quadrants w/o difficulty    Acuity: Within Defined Limits      Range of Motion:  In BUEs  AROM: Within functional limits  PROM: Within functional limits    Strength: In BUEs  Strength: Within functional limits    Coordination:  Coordination: Within functional limits  Fine Motor Skills-Upper: Left Intact; Right Intact    Gross Motor Skills-Upper: Left Intact; Right Intact    Tone & Sensation:  In BUEs  Tone: Normal  Sensation: Intact    Balance:  Sitting: Impaired; Without support  Sitting - Static: Good (unsupported)  Sitting - Dynamic: Fair (occasional)  Standing: Impaired; Without support  Standing - Static: Constant support  Standing - Dynamic : Poor    Functional Mobility and Transfers for ADLs:  Bed Mobility:  Supine to Sit: Supervision; Additional time  Sit to Supine: Supervision; Additional time    Transfers:  Functional Transfers  Sit to Stand: Minimum assistance  Stand to Sit: Minimum assistance  Bed to Chair: Moderate assistance  Toilet Transfer : Moderate assistance (per obs of functional mobility and PT report)    ADL Assessment:  Feeding: Independent    Oral Facial Hygiene/Grooming: Independent    Bathing: Supervision; Adaptive equipment (d/t balance deficits per PT report)    Upper Body Dressing: Independent    Lower Body Dressing: Minimum assistance; Additional time (d/t LLE weakness and balance deficits )    Toileting: Independent    ADL Intervention and task modifications:    Cognitive Retraining  Safety/Judgement: Awareness of environment; Insight into deficits; Fall prevention      Functional Measure:   Fugl-Martin Assessment of Motor Recovery after Stroke:     Reflex Activity  Flexors/Biceps/Fingers: Can be elicited  Extensors/Triceps: Can be elicited  Reflex Subtotal: 4    Volitional Movement Within Synergies  Shoulder Retraction: Full  Shoulder Elevation: Full  Shoulder Abduction (90 degrees): Full  Shoulder External Rotation: Full  Elbow Flexion: Full  Forearm Supination: Full  Shoulder Adduction/Internal Rotation: Full  Elbow Extension: Full  Forearm Pronation: Full  Subtotal: 18    Volitional Movement Mixing Synergies  Hand to Lumbar Spine: Full  Shoulder Flexion (0-90 degrees): Full  Pronation-Supination: Full  Subtotal: 6    Volitional Movement With Little or No Synergy  Shoulder Abduction (0-90 degrees): Full  Shoulder Flexion ( degrees): Full  Pronation/Supination: Full  Subtotal : 6    Normal Reflex Activity  Biceps, Triceps, Finger Flexors:  Full  Subtotal : 2    Upper Extremity Total   Upper Extremity Total: 36    Wrist  Stability at 15 Degree Dorsiflexion: Full  Repeated Dorsiflexion/ Volar Flexion: Full  Stability at 15 Degree Dorsiflexion: Full  Repeated Dorsiflexion/ Volar Flexion: Full  Circumduction: Full  Wrist Total: 10    Hand  Mass Flexion: Full  Mass Extension: Full  Grasp A: Full  Grasp B: Full  Grasp C: Full  Grasp D: Full  Grasp E: Full  Hand Total: 14    Coordination/Speed  Tremor: None  Dysmetria: None  Time: <1s  Coordination/Speed Total : 6    Total A-D  Total A-D (Motor Function): 66/66     Percentage of impairment CH  0% CI  1-19% CJ  20-39% CK  40-59% CL  60-79% CM  80-99% CN  100%   Fugl-Martin score: 0-66 66 53-65 39-52 26-38 13-25 1-12   0      This is a reliable/valid measure of arm function after a neurological event. It has established value to characterize functional status and for measuring spontaneous and therapy-induced recovery; tests proximal and distal motor functions. Fugl-Martin Assessment  UE scores recorded between five and 30 days post neurologic event can be used to predict UE recovery at six months post neurologic event. Severe = 0-21 points   Moderately Severe = 22-33 points   Moderate = 34-47 points   Mild = 48-66 points  SUZANNE Espinosa, CHRISSY Rich, & OSCAR Rowe (1992). Measurement of motor recovery after stroke: Outcome assessment and sample size requirements.  Stroke, 23, pp. 3773-4562.   ------------------------------------------------------------------------------------------------------------------------------------------------------------------  MCID:  Stroke:   Charan Villanueva al, 2001; n = 171; mean age 79 (5) years; assessed within 16 (12) days of stroke, Acute Stroke)  FMA Motor Scores from Admission to Discharge   10 point increase in FMA Upper Extremity = 1.5 change in discharge FIM   10 point increase in FMA Lower Extremity = 1.9 change in discharge FIM  MDC:   Stroke:   Joshua Reese et al, 2008, n = 14, mean age = 59.9 (14.6) years, assessed on average 14 (6.5) months post stroke, Chronic Stroke)   FMA = 5.2 points for the Upper Extremity portion of the assessment     Barthel Index:    Bathin  Bladder: 10  Bowels: 10  Groomin  Dressin  Feeding: 10  Mobility: 0  Stairs: 0  Toilet Use: 5  Transfer (Bed to Chair and Back): 5  Total: 55       Barthel and G-code impairment scale:  Percentage of impairment CH  0% CI  1-19% CJ  20-39% CK  40-59% CL  60-79% CM  80-99% CN  100%   Barthel Score 0-100 100 99-80 79-60 59-40 20-39 1-19   0   Barthel Score 0-20 20 17-19 13-16 9-12 5-8 1-4 0      The Barthel ADL Index: Guidelines  1. The index should be used as a record of what a patient does, not as a record of what a patient could do. 2. The main aim is to establish degree of independence from any help, physical or verbal, however minor and for whatever reason. 3. The need for supervision renders the patient not independent. 4. A patient's performance should be established using the best available evidence. Asking the patient, friends/relatives and nurses are the usual sources, but direct observation and common sense are also important. However direct testing is not needed. 5. Usually the patient's performance over the preceding 24-48 hours is important, but occasionally longer periods will be relevant. 6. Middle categories imply that the patient supplies over 50 per cent of the effort. 7. Use of aids to be independent is allowed. Anitha Reina., Barthel, D.W. (2419). Functional evaluation: the Barthel Index. [facility] [address] [facility] (14)2. TYESHA Rivera, Joaquin Holloway., Pedro Garcia., [place], [address] [place] [address] (). Measuring the change indisability after inpatient rehabilitation; comparison of the responsiveness of the Barthel Index and Functional Collier Measure. Journal of Neurology, Neurosurgery, and Psychiatry, 66(4), 354-145. Rosy Rodriguez N.J.A, AMNA Tomlin, & Patsy Ca MTracieA. (2004.) Assessment of post-stroke quality of life in cost-effectiveness studies: The usefulness of the Barthel Index and the EuroQoL-5D.  Quality of Life Research, 13, 427-43       G codes: In compliance with CMSs Claims Based Outcome Reporting, the following G-code set was chosen for this patient based on their primary functional limitation being treated: The outcome measure chosen to determine the severity of the functional limitation was the Barthel Index with a score of 55/100 which was correlated with the impairment scale. ? Self Care:     - CURRENT STATUS: CK - 40%-59% impaired, limited or restricted    - GOAL STATUS: CJ - 20%-39% impaired, limited or restricted    - D/C STATUS:  ---------------To be determined---------------      Occupational Therapy Evaluation Charge Determination   History Examination Decision-Making   LOW Complexity : Brief history review  LOW Complexity : 1-3 performance deficits relating to physical, cognitive , or psychosocial skils that result in activity limitations and / or participation restrictions  MEDIUM Complexity : Patient may present with comorbidities that affect occupational performnce. Miniml to moderate modification of tasks or assistance (eg, physical or verbal ) with assesment(s) is necessary to enable patient to complete evaluation       Based on the above components, the patient evaluation is determined to be of the following complexity level: LOW     Pain:  Pain Scale 1: Numeric (0 - 10)  Pain Intensity 1: 0    Activity Tolerance:   Fair   Please refer to the flowsheet for vital signs taken during this treatment. After treatment:   []  Patient left in no apparent distress sitting up in chair  [x]  Patient left in no apparent distress in bed  [x]  Call bell left within reach  [x]  Nursing notified  []  Caregiver present  []  Bed alarm activated    COMMUNICATION/EDUCATION:   The patients plan of care was discussed with: Physical Therapist and Registered Nurse. Patient was educated regarding His deficit(s) of L side weakness as this relates to His diagnosis of probable Acute CVA .   He demonstrated Fair understanding as evidenced by verbal responses. []      Home safety education was provided and the patient/caregiver indicated understanding. [x]      Patient/family have participated as able and agree with findings and recommendations. []      Patient is unable to participate in plan of care at this time. This patients plan of care is appropriate for delegation to [facility].     Thank you for this referral.  Vanessa Hall, OT  Time Calculation: 14 mins

## 2018-02-14 NOTE — PROGRESS NOTES
The patient presented with high blood pressure and left-sided weakness. The CM met with the patient at bedside in order to assess for patient needs and introduce the role of CM. The patient does not have PCP currently and is uninsured. The CM provided the patient with a Care Card application at bedside, in addition, the CM discussed the [facility] for follow-up- patient is agreeable-CM provided the patient with the application at bedside and scheduled an intake appointment for Monday 2/19 at 10:30 a.m.- appointment is on the patient's AVS. The patient stated that he lives with a roommate in an apartment, and quit his job approximately 10 days ago. The CM inquired about transportation and the patient stated he does not have any friends or family that could pick him up from the hospital- patient will require transportation assistance. CM will await PT/OT recommendations for services needed upon discharge. CM will continue to follow. BEN Yoder    14:18 p.m.- Patient reviewed and discussed at bedside during IDR rounds- PT currently recommending Inpatient Rehab. Patient gave permission to send referrals to any Inpatient Rehab in the area- patient is uninsured and while require a aleyda bed. CM sent referrals to [address] [place] [address] to inquire about aleyda bed and ability to accept patient. BEN Yoder    15:52 p.m.- CM spoke with Chacon, Liaison with [facility] in [place] regarding a aleyda bed for the patient. Chacon stated they do have aleyda beds available, however, they have a requirement that patient's must be a [facility] citizen in order to be eligible for aleyda bed. CM met with the patient at bedside to discuss and patient is stating he is currently here under a [facility] [employer], however, he endorses applying for [place] Citizenship last week- patient is stating he should receive an answer back regarding citizenship status within three to four months.  CM called Chacon ([phone]) with [facility] and she stated she would speak to the supervisor to discuss case. CM will continue to follow. BEN Ramírez    Care Management Interventions  PCP Verified by CM:  Yes (Patient does not have PCP- CM scheduled an intake appointment with [facility])  Mode of Transport at Discharge:  (Patient does not have transportation upon discharge- will need assistance, patient does not have insurance)  Transition of Care Consult (CM Consult): Discharge Planning  MyChart Signup: No  Discharge Durable Medical Equipment: No  Health Maintenance Reviewed: Yes  Physical Therapy Consult: Yes  Occupational Therapy Consult: Yes  Speech Therapy Consult: Yes  Current Support Network: Own Home (Patient lives in apartment with roommate)  Confirm Follow Up Transport:  (Patient stated he does not have transportation- no friends or family to assist. Patient is uninsured and will require transportation assistance.)  Plan discussed with Pt/Family/Caregiver: Yes  Discharge Location  Discharge Placement: Home (CM will await PT/OT recommendations for patient upon discharge)

## 2018-02-14 NOTE — ROUTINE PROCESS
Bedside shift change report given to Elizabeth Butler (oncoming nurse) by Hitesh Miller (offgoing nurse).  Report included the following information SBAR, ED Summary, MAR, Recent Results and Cardiac Rhythm NSR-ST.

## 2018-02-14 NOTE — PROGRESS NOTES
Hospitalist Progress Note  Alexander Pino MD  Answering service: [phone] from in house phone      Date of Service:  2018  NAME:  Goldy Beatty  :  1970  MRN:  [id number]      Admission Summary:   Goldy Beatty is a 50 y.o. male who presents with PMHx of HTN, noncompliance to medication, unable to say when he last took BP meds, admitted for left sided weakness. Pt states that he noticed it last night involving arm and leg. When asked why he did not seek medical attention last night, he said \"it could wait until morning\". Interval history / Subjective:      Doing well awaiting w/u   No overnight issues no new complains   No questions from him    Assessment & Plan:     Left Hemiparesis likely due to Acute CVA  - ASA, add statin  - / HDL 53/   - PT/OT/ST  - Neuro   - CTA head/neck- Multifocal areas of presumably remote ischemia in the basal ganglia and  periventricular deep white matter of both cerebral hemispheres. However, if  there is suspicion for acute infarct, recommend MRI with diffusion-weighted No stenosis  sequences.  - MRI Brain PENDING   - permissive hypertension for 24 hours      Hypertensive Urgency  - Labetalol prn  - Cardene gtt if necessary  - will start lopressor 25mg BID     Abnormal EKG/Elevated BP  - CT chest/abd/pel ordered-  1. CHEST: No CT evidence for aortic dissection, aneurysm or leak. No pulmonary  embolus is obvious, although the examination was not performed as an angiogram.  2..ABDOMEN: No aortic aneurysm, dissection or leak. No acute abnormality.   3. PELVIS: No acute abnormality.    - ECHO to evaluate for WMA pending  - Troponin 0.07, will check serial Troponin  - Cardiology consulted      Code status: Full  DVT prophylaxis: scd    Care Plan discussed with: Patient/Family and Nurse  Disposition: TBD     Hospital Problems  Never Reviewed          Codes Class Noted POA Left-sided weakness ICD-10-CM: R53.1  ICD-9-CM: 728.87  2/13/2018 Unknown        Hypertensive urgency ICD-10-CM: I16.0  ICD-9-CM: 401.9  2/13/2018 Unknown        Abnormal EKG ICD-10-CM: R94.31  ICD-9-CM: 794.31  2/13/2018 Unknown        Acute CVA (cerebrovascular accident) [facility]) ICD-10-CM: I63.9  ICD-9-CM: 434.91  2/13/2018 Unknown                Review of Systems:   A comprehensive review of systems was negative. Vital Signs:    Last 24hrs VS reviewed since prior progress note. Most recent are:  Visit Vitals    /80 (BP 1 Location: Right arm, BP Patient Position: At rest)    Pulse 83    Temp 98.3 °F (36.8 °C)    Resp 15    Ht 5' 11\" (1.803 m)    Wt 91.1 kg (200 lb 14.4 oz)    SpO2 94%    BMI 28.02 kg/m2       No intake or output data in the 24 hours ending 02/14/18 0088     Physical Examination:             Constitutional:  No acute distress, cooperative, pleasant    ENT:  Oral mucous moist, oropharynx benign. Neck supple,    Resp:  CTA bilaterally. No wheezing/rhonchi/rales. No accessory muscle use   CV:  Regular rhythm, normal rate, no murmurs, gallops, rubs    GI:  Soft, non distended, non tender. normoactive bowel sounds, no hepatosplenomegaly     Musculoskeletal:  No edema, warm, 2+ pulses throughout    Neurologic:  Moves all extremities. AAOx3, CN II-XII reviewed LLE/ LUE - 4/5      Psych:  Good insight, Not anxious nor agitated.        Data Review:    I personally reviewed  Image and labs      Labs:     Recent Labs      02/13/18   1251   WBC  10.5   HGB  15.6   HCT  47.7   PLT  284     Recent Labs      02/14/18   0307  02/13/18   1554  02/13/18   1251   NA  138   --   137   K  3.5   --   3.7   CL  104   --   104   CO2  24   --   25   BUN  9   --   12   CREA  0.96   --   1.11   GLU  112*   --   119*   CA  8.5   --   9.4   MG  1.9  1.6   --      Recent Labs      02/13/18   1251   SGOT  22   ALT  25   AP  111   TBILI  0.4   TP  8.2   ALB  3.9   GLOB  4.3*     Recent Labs      02/13/18   1239 INR  1.0      No results for input(s): FE, TIBC, PSAT, FERR in the last 72 hours. No results found for: FOL, RBCF   No results for input(s): PH, PCO2, PO2 in the last 72 hours.   Recent Labs      02/14/18   0307  02/13/18   1753  02/13/18   1554   TROIQ  0.06*  0.09*  0.10*     Lab Results   Component Value Date/Time    Cholesterol, total 263 (H) 02/14/2018 03:07 AM    HDL Cholesterol 53 02/14/2018 03:07 AM    LDL, calculated 172.4 (H) 02/14/2018 03:07 AM    Triglyceride 188 (H) 02/14/2018 03:07 AM    CHOL/HDL Ratio 5.0 02/14/2018 03:07 AM     Lab Results   Component Value Date/Time    Glucose (POC) 108 (H) 02/13/2018 12:37 PM     Lab Results   Component Value Date/Time    Color YELLOW/STRAW 10/14/2017 11:38 AM    Appearance CLEAR 10/14/2017 11:38 AM    Specific gravity 1.023 10/14/2017 11:38 AM    pH (UA) 5.5 10/14/2017 11:38 AM    Protein 100 (A) 10/14/2017 11:38 AM    Glucose NEGATIVE  10/14/2017 11:38 AM    Ketone NEGATIVE  10/14/2017 11:38 AM    Bilirubin NEGATIVE  10/14/2017 11:38 AM    Urobilinogen 1.0 10/14/2017 11:38 AM    Nitrites NEGATIVE  10/14/2017 11:38 AM    Leukocyte Esterase NEGATIVE  10/14/2017 11:38 AM    Epithelial cells FEW 10/14/2017 11:38 AM    Bacteria NEGATIVE  10/14/2017 11:38 AM    WBC 0-4 10/14/2017 11:38 AM    RBC 5-10 10/14/2017 11:38 AM         Medications Reviewed:     Current Facility-Administered Medications   Medication Dose Route Frequency    acetaminophen (TYLENOL) tablet 650 mg  650 mg Oral Q4H PRN    Or    acetaminophen (TYLENOL) solution 650 mg  650 mg Per NG tube Q4H PRN    Or    acetaminophen (TYLENOL) suppository 650 mg  650 mg Rectal Q4H PRN    0.9% sodium chloride infusion  75 mL/hr IntraVENous CONTINUOUS    ondansetron (ZOFRAN) injection 4 mg  4 mg IntraVENous Q6H PRN    aspirin chewable tablet 81 mg  81 mg Oral DAILY    labetalol (NORMODYNE;TRANDATE) injection 5 mg  5 mg IntraVENous Q10MIN PRN    docusate sodium (COLACE) capsule 100 mg  100 mg Oral BID    bisacodyl (DULCOLAX) tablet 5 mg  5 mg Oral DAILY PRN    famotidine (PEPCID) tablet 20 mg  20 mg Oral Q12H    niCARdipine (CARDENE) 25 mg in 0.9% sodium chloride 250 mL infusion  5 mg/hr IntraVENous CONTINUOUS     ______________________________________________________________________  EXPECTED LENGTH OF STAY: - - -  ACTUAL LENGTH OF STAY:          1                 Lucretia Primrose, MD

## 2018-02-14 NOTE — INTERDISCIPLINARY ROUNDS
IDR/SLIDR Summary          Patient: Mikael Mendosa MRN: [id number]    Age: 50 y.o. YOB: 1970 Room/Bed: [facility]   Admit Diagnosis: Left-sided weakness  Hypertensive urgency  Acute CVA (cerebrovascular accident) ([facility], [place] [address]  Hypertensive urgency  Abnormal EKG  Principal Diagnosis: <principal problem not specified>   Goals: Safety, MRI, Neuro consult, BP management  Readmission: NO  Quality Measure: Not applicable  VTE Prophylaxis: Mechanical  Influenza Vaccine screening completed? YES  Pneumococcal Vaccine screening completed? NO  Mobility needs: No   Nutrition plan:Yes  Consults:P.T, O.T. and Case Management    Financial concerns:No  Escalated to CM? YES  RRAT Score: 8   Interventions:  Testing due for pt today?  YES  LOS: 1 days Expected length of stay 3-4 days  Discharge plan: TBD   PCP: None  Transportation needs: No    Days before discharge:two or more days before discharge   Discharge disposition: TBD    Signed:     Chary Bowden  2/14/2018  939 AM

## 2018-02-14 NOTE — CONSULTS
Neuro consult completed. Dictated note to follow. Pt with HTN presenting with left sided weakness that is persistent. Exam with left arm and leg weakness, +PD, upgoing toe on left, diminished reflexes in bilateral LE. CT head with multiple hypodensities concerning for possible old CVAs, CTA H/N with severe irregularity of cerebral vasculature concerning for advanced atherosclerosis. Probable subcortical CVA. Now with new diagnosis of hypercholesterolemia and possible diabetes. MRI brain pending, echo pending. Continue ASA 81mg daily and statin.

## 2018-02-14 NOTE — PROGRESS NOTES
Problem: Falls - Risk of  Goal: *Absence of Falls  Document Avinash Fall Risk and appropriate interventions in the flowsheet.    Outcome: Progressing Towards Goal  Fall Risk Interventions:            Medication Interventions: Assess postural VS orthostatic hypotension, Bed/chair exit alarm, Evaluate medications/consider consulting pharmacy, Patient to call before getting OOB, Teach patient to arise slowly

## 2018-02-14 NOTE — PROGRESS NOTES
[facility] Progress Note    2/14/2018 8:30 AM   Admit Date: 2/13/2018  Admit Diagnosis: Left-sided weakness; Hypertensive urgency; Acute CVA (cerebro*    Assessment/Plan     1. Hypertensive emergency - /130 on admission, eats salty foods and has not taken BP medications for years  -better controlled now on IV Cardene at 5mg/hr, lisinopril 20mg daily and metoprolol 12.5mg BID, transition off gtt today and onto po as tolerated  -he has not seen a PCP in several years   -advised him of importance of limiting sodium in his diet   -no aortic dissection or aneurysm by CT scan  2. Left sided weakness - seen by neurology, probable subcortical CVA, awaiting brain MRI results  -not a candidate for tPA  -management per neurology   3. Abnormal ECG - he denies any chest pain or dyspnea, appears to be NSR with LVH with strain, awaiting TTE results   4. Elevated troponin - non-specific troponin elevation, abnormal ECG with LVH with strain, awaiting TTE results  5. Hx of CVA - initial Head CT showed focal low densities suggesting old lacunar infarctions in both basal ganglia regions, the left thalamus and in the periventricular deep white matter, the largest of these was in the right frontal lobe, there was a similar focal low density in the left basilar alex suggesting old ischemia  -probable subcortical CVA per neurology, awaiting brain MRI  -management per neurology  6. DM Type 2 - A1c 6.6%, management per IM  7. 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 He is moving his left extremities a little better today.     Objective:      Physical Exam:  Visit Vitals    /87    Pulse 88    Temp 98.8 °F (37.1 °C)    Resp 17    Ht 5' 11\" (1.803 m)    Wt 200 lb 14.4 oz (91.1 kg)    SpO2 98%    BMI 28.02 kg/m2     General Appearance:  Well developed, well nourished, alert and oriented x 3, in no acute distress. Ears/Nose/Mouth/Throat:   Hearing grossly normal.         Neck: Supple. Chest:   Lungs clear to auscultation bilaterally. Cardiovascular:  Regular rate and rhythm, S1, S2 normal, no murmur. Abdomen:   Soft, non-tender, bowel sounds are active. Extremities: No edema bilaterally. Left sided weakness present   Skin: Warm and dry.            Telemetry: normal sinus rhythm    Data Review:   Labs:    Recent Results (from the past 24 hour(s))   MAGNESIUM    Collection Time: 02/13/18  3:54 PM   Result Value Ref Range    Magnesium 1.6 1.6 - 2.4 mg/dL   TSH 3RD GENERATION    Collection Time: 02/13/18  3:54 PM   Result Value Ref Range    TSH 0.72 0.36 - 3.74 uIU/mL   TROPONIN I    Collection Time: 02/13/18  3:54 PM   Result Value Ref Range    Troponin-I, Qt. 0.10 (H) <0.05 ng/mL   TROPONIN I    Collection Time: 02/13/18  5:53 PM   Result Value Ref Range    Troponin-I, Qt. 0.09 (H) <0.05 ng/mL   HEMOGLOBIN A1C WITH EAG    Collection Time: 02/14/18  3:07 AM   Result Value Ref Range    Hemoglobin A1c 6.6 (H) 4.2 - 6.3 %    Est. average glucose 143 mg/dL   TSH 3RD GENERATION    Collection Time: 02/14/18  3:07 AM   Result Value Ref Range    TSH 1.42 0.36 - 3.74 uIU/mL   LIPID PANEL    Collection Time: 02/14/18  3:07 AM   Result Value Ref Range    LIPID PROFILE          Cholesterol, total 263 (H) <200 MG/DL    Triglyceride 188 (H) <150 MG/DL    HDL Cholesterol 53 MG/DL    LDL, calculated 172.4 (H) 0 - 100 MG/DL    VLDL, calculated 37.6 MG/DL    CHOL/HDL Ratio 5.0 0 - 5.0     TROPONIN I    Collection Time: 02/14/18  3:07 AM   Result Value Ref Range    Troponin-I, Qt. 0.06 (H) <2.76 ng/mL   METABOLIC PANEL, BASIC    Collection Time: 02/14/18  3:07 AM Result Value Ref Range    Sodium 138 136 - 145 mmol/L    Potassium 3.5 3.5 - 5.1 mmol/L    Chloride 104 97 - 108 mmol/L    CO2 24 21 - 32 mmol/L    Anion gap 10 5 - 15 mmol/L    Glucose 112 (H) 65 - 100 mg/dL    BUN 9 6 - 20 MG/DL    Creatinine 0.96 0.70 - 1.30 MG/DL    BUN/Creatinine ratio 9 (L) 12 - 20      GFR est AA >60 >60 ml/min/1.73m2    GFR est non-AA >60 >60 ml/min/1.73m2    Calcium 8.5 8.5 - 10.1 MG/DL   MAGNESIUM    Collection Time: 02/14/18  3:07 AM   Result Value Ref Range    Magnesium 1.9 1.6 - 2.4 mg/dL   EKG, 12 LEAD, INITIAL    Collection Time: 02/14/18  6:52 AM   Result Value Ref Range    Ventricular Rate 82 BPM    Atrial Rate 82 BPM    P-R Interval 132 ms    QRS Duration 94 ms    Q-T Interval 408 ms    QTC Calculation (Bezet) 476 ms    Calculated P Axis 66 degrees    Calculated R Axis -3 degrees    Calculated T Axis 179 degrees    Diagnosis       Normal sinus rhythm  Biatrial enlargement  Left ventricular hypertrophy  ST elevation, consider anterior injury or acute infarct  ** ACUTE MI **  When compared with ECG of 13-FEB-2018 13:56,  No significant change was found  Confirmed by Sona Aleman M.D., Alonso Marsh ([id number]) on 2/14/2018 12:39:53 PM     DRUG SCREEN, URINE    Collection Time: 02/14/18  1:31 PM   Result Value Ref Range    AMPHETAMINES NEGATIVE  NEG      BARBITURATES NEGATIVE  NEG      BENZODIAZEPINES NEGATIVE  NEG      COCAINE NEGATIVE  NEG      METHADONE NEGATIVE  NEG      OPIATES NEGATIVE  NEG      PCP(PHENCYCLIDINE) NEGATIVE  NEG      THC (TH-CANNABINOL) NEGATIVE  NEG      Drug screen comment (NOTE)            Current Facility-Administered Medications   Medication Dose Route Frequency    metoprolol tartrate (LOPRESSOR) tablet 12.5 mg  12.5 mg Oral BID    [START ON 2/15/2018] lisinopril (PRINIVIL, ZESTRIL) tablet 20 mg  20 mg Oral DAILY    atorvastatin (LIPITOR) tablet 20 mg  20 mg Oral QHS    acetaminophen (TYLENOL) tablet 650 mg  650 mg Oral Q4H PRN    Or    acetaminophen (TYLENOL) solution 650 mg  650 mg Per NG tube Q4H PRN    Or    acetaminophen (TYLENOL) suppository 650 mg  650 mg Rectal Q4H PRN    0.9% sodium chloride infusion  75 mL/hr IntraVENous CONTINUOUS    ondansetron (ZOFRAN) injection 4 mg  4 mg IntraVENous Q6H PRN    aspirin chewable tablet 81 mg  81 mg Oral DAILY    labetalol (NORMODYNE;TRANDATE) injection 5 mg  5 mg IntraVENous Q10MIN PRN    docusate sodium (COLACE) capsule 100 mg  100 mg Oral BID    bisacodyl (DULCOLAX) tablet 5 mg  5 mg Oral DAILY PRN    famotidine (PEPCID) tablet 20 mg  20 mg Oral Q12H    niCARdipine (CARDENE) 25 mg in 0.9% sodium chloride 250 mL infusion  5 mg/hr IntraVENous CONTINUOUS       Caitie Lozano MD  [facility] [address] [place] [address] [address] [facility] [address]  Bess Patton  [phone]

## 2018-02-14 NOTE — PROGRESS NOTES
Problem: Mobility Impaired (Adult and Pediatric)  Goal: *Acute Goals and Plan of Care (Insert Text)  Physical Therapy Goals  Initiated 2/14/2018  1. Patient will move from supine to sit and sit to supine  in bed with modified independence within 7 day(s). 2.  Patient will transfer from bed to chair and chair to bed with supervision/set-up using the least restrictive device within 7 day(s). 3.  Patient will perform sit to stand with supervision/set-up within 7 day(s). 4.  Patient will ambulate with minimal assistance/contact guard assist for 150 feet with the least restrictive device within 7 day(s). 5.  Patient will ascend/descend 6 stairs with 1 handrail(s) with minimal assistance/contact guard assist within 7 day(s). 6.  Patient will improve Mendiola Balance score by 7 points within 7 days. physical Therapy EVALUATION- neuro population    Patient: Herson Mckee (50 y.o. male)  Date: 2/14/2018  Primary Diagnosis: Left-sided weakness  Hypertensive urgency  Acute CVA (cerebrovascular accident) ([facility] [address]  Hypertensive urgency  Abnormal EKG        Precautions: fall       ASSESSMENT :  Based on the objective data described below, the patient presents with significantly impaired mobility as seen in left sided weakness, decreased balance and impaired gait. LLE more impaired than LUE and balance is grossly impaired as seen in Mendiola Balance score of  7/56   Putting him at high fall risk. He also demonstrates decreased insight to the implications of his deficits as it relates to general mobility with transfers and gait. (instructed to call staff when ready to get back to bed and before I left started to get  Back to bed himself)  Did add chair alarm and reminded to call staff for all mobility. His gait is significantly impaired with left knee/hip buckle with each step and altered Left swing.   At this time recommend nursing to use [facility] for toilet and urinal.  He would benefit from inpatient rehab to address goals as he is not safe to return home at this time and far below his functional level. .    Patient will benefit from skilled intervention to address the above impairments. Patients rehabilitation potential is considered to be Good  Factors which may influence rehabilitation potential include:   []           None noted  []           Mental ability/status  [x]           Medical condition  []           Home/family situation and support systems  [x]           Safety awareness  []           Pain tolerance/management  []           Other:      PLAN :  Recommendations and Planned Interventions:  [x]             Bed Mobility Training             [x]      Neuromuscular Re-Education  [x]             Transfer Training                   []      Orthotic/Prosthetic Training  [x]             Gait Training                         []      Modalities  [x]             Therapeutic Exercises           []      Edema Management/Control  [x]             Therapeutic Activities            [x]      Patient and Family Training/Education  []             Other (comment):  Frequency/Duration: Patient will be followed by physical therapy 5 times a week to address goals. Discharge Recommendations: Inpatient Rehab  Further Equipment Recommendations for Discharge: none if to rehab     SUBJECTIVE:   Patient stated My left is weak.     OBJECTIVE DATA SUMMARY:   HISTORY:    Past Medical History:   Diagnosis Date    Hypertension    History reviewed. No pertinent surgical history. 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   Personal factors and/or comorbidities impacting plan of care: HTN with poor compliance    Home Situation  Home Environment: Private residence  # Steps to Enter: 6  Rails to Enter: Yes  One/Two Story Residence: Two story  # of Interior Steps: 14  Living Alone: No  Support Systems: Family member(s)  Patient Expects to be Discharged to[de-identified] Private residence  Current DME Used/Available at Home: None    EXAMINATION/PRESENTATION/DECISION MAKING:   Critical Behavior:  Neurologic State: Alert  Orientation Level: Oriented X4  Cognition: Decreased attention/concentration  Safety/Judgement: Awareness of environment  Hearing: Auditory  Auditory Impairment: None  Skin:  See nursing notes  Edema: none  Range Of Motion:     RLE Assessment (WDL): Within defined limits              LLE Assessment (WDL): Exception to WDL     Strength:          RLE Assessment (WDL): Within defined limits        LLE Assessment (WDL): Exception to WDL  LLE Strength  L Hip Flexion: 2+  L Hip ABduction: 2  L Hip ADduction: 2  L Knee Extension: 4+  L Ankle Dorsiflexion: 0  Tone & Sensation:                     RLE Assessment (WDL): Within defined limits     LLE Assessment (WDL): Exception to WDL      Coordination:   decreased L UE and LE  Vision:      Functional Mobility:  Bed Mobility:     Supine to Sit: Additional time;Supervision        Transfers:  Sit to Stand: Minimum assistance  Stand to Sit: Minimum assistance  Stand Pivot Transfers: Moderate assistance     Bed to Chair: Moderate assistance              Balance:   Sitting: Impaired; Without support  Sitting - Static: Good (unsupported)  Sitting - Dynamic: Fair (occasional)  Standing: Impaired; Without support  Standing - Static: Constant support  Standing - Dynamic : Poor  Ambulation/Gait Training:  Distance (ft): 10 Feet (ft)  Assistive Device: Gait belt; Other (comment) (hand hold assist)  Ambulation - Level of Assistance: Assist x2; Moderate assistance     Gait Description (WDL): Exceptions to WDL  Gait Abnormalities: Antalgic;Decreased step clearance; Foot drop; Hemiplegic; Path deviations;Trunk sway increased           Stance: Weight shift  Speed/Estrellita: Pace decreased (<100 feet/min)  Step Length: Right shortened;Left shortened  Swing Pattern: Left asymmetrical                      Functional Measure    [place] Balance Test:    Sitting to Standin  Standing Unsupported: 1  Sitting with Back Unsupported: 4  Standing to Sittin  Transfers: 1  Standing Unsupported with Eyes Closed: 0  Standing Unsupported with Feet Together: 0  Reach Forward with Outstretched Arm: 0   Object: 0  Turn to Look Over Shoulders: 0  Turn 360 Degrees: 0  Alternate Foot on Step/Stool: 0  Standing Unsupported One Foot in Front: 0  Stand on One Le  Total: 7         56=Maximum possible score;   0-20=High fall risk  21-40=Moderate fall risk   41-56=Low fall risk     Mendiola Balance Test and G-code impairment scale:  Percentage of Impairment CH    0%   CI    1-19% CJ    20-39% CK    40-59% CL    60-79% CM    80-99% CN     100%   Mendiola   Score 0-56 56 45-55 34-44 23-33 12-22 1-11 0       G codes: In compliance with CMSs Claims Based Outcome Reporting, the following G-code set was chosen for this patient based on their primary functional limitation being treated: The outcome measure chosen to determine the severity of the functional limitation was the Mooer with a score of 7/56 which was correlated with the impairment scale.     ? Mobility - Walking and Moving Around:     - CURRENT STATUS: CM - 80%-99% impaired, limited or restricted    - GOAL STATUS: CL - 60%-79% impaired, limited or restricted    - D/C STATUS:  ---------------To be determined---------------     Physical Therapy Evaluation Charge Determination   History Examination Presentation Decision-Making   LOW Complexity : Zero comorbidities / personal factors that will impact the outcome / POC MEDIUM Complexity : 3 Standardized tests and measures addressing body structure, function, activity limitation and / or participation in recreation  MEDIUM Complexity : Evolving with changing characteristics  MEDIUM Complexity : FOTO score of 26-74      Based on the above components, the patient evaluation is determined to be of the following complexity level: MEDIUM    Therapeutic Exercises:   Instructed on hip flexion, knee extension and ankle pumps  Pain:  Pain Scale 1: Numeric (0 - 10)  Pain Intensity 1: 0              Activity Tolerance:   BP elevated  Please refer to the flowsheet for vital signs taken during this treatment. After treatment:   [x]     Patient left in no apparent distress sitting up in chair  []     Patient left in no apparent distress in bed  [x]     Call bell left within reach  [x]     Nursing notified  []     Caregiver present  [x]     Bed alarm activated    COMMUNICATION/EDUCATION:   The patients plan of care was discussed with: Registered Nurse. Patient was educated regarding His deficit(s) of strength, balance and gait as this relates to His diagnosis of CVA. He demonstrated Fair understanding as evidenced by demonstration and lack of following directions for safety. Patient and/or family was verbally educated on the BE FAST acronym for signs/symptoms of CVA and TIA. BE FAST was written on patient's communication board  for visual education and reinforcement. All questions answered with patient indicating fair understanding. [x]  Fall prevention education was provided and the patient/caregiver indicated understanding. []  Patient/family have participated as able in goal setting and plan of care. [x]  Patient/family agree to work toward stated goals and plan of care. []  Patient understands intent and goals of therapy, but is neutral about his/her participation. []  Patient is unable to participate in goal setting and plan of care.     Thank you for this referral.  Darin Bowman, PT   Time Calculation: 27 mins

## 2018-02-15 LAB
ANION GAP SERPL CALC-SCNC: 7 MMOL/L (ref 5–15)
BUN SERPL-MCNC: 12 MG/DL (ref 6–20)
BUN/CREAT SERPL: 12 (ref 12–20)
CALCIUM SERPL-MCNC: 8.6 MG/DL (ref 8.5–10.1)
CHLORIDE SERPL-SCNC: 104 MMOL/L (ref 97–108)
CO2 SERPL-SCNC: 27 MMOL/L (ref 21–32)
CREAT SERPL-MCNC: 1.04 MG/DL (ref 0.7–1.3)
GLUCOSE SERPL-MCNC: 115 MG/DL (ref 65–100)
MAGNESIUM SERPL-MCNC: 2 MG/DL (ref 1.6–2.4)
POTASSIUM SERPL-SCNC: 3.9 MMOL/L (ref 3.5–5.1)
SODIUM SERPL-SCNC: 138 MMOL/L (ref 136–145)

## 2018-02-15 PROCEDURE — 74011250637 HC RX REV CODE- 250/637: Performed by: HOSPITALIST

## 2018-02-15 PROCEDURE — 80048 BASIC METABOLIC PNL TOTAL CA: CPT | Performed by: NURSE PRACTITIONER

## 2018-02-15 PROCEDURE — 65660000000 HC RM CCU STEPDOWN

## 2018-02-15 PROCEDURE — 97116 GAIT TRAINING THERAPY: CPT

## 2018-02-15 PROCEDURE — 74011250637 HC RX REV CODE- 250/637: Performed by: NURSE PRACTITIONER

## 2018-02-15 PROCEDURE — 97530 THERAPEUTIC ACTIVITIES: CPT

## 2018-02-15 PROCEDURE — 74011000250 HC RX REV CODE- 250: Performed by: FAMILY MEDICINE

## 2018-02-15 PROCEDURE — 83735 ASSAY OF MAGNESIUM: CPT | Performed by: NURSE PRACTITIONER

## 2018-02-15 PROCEDURE — 36415 COLL VENOUS BLD VENIPUNCTURE: CPT | Performed by: NURSE PRACTITIONER

## 2018-02-15 RX ORDER — LISINOPRIL 20 MG/1
20 TABLET ORAL DAILY
Status: DISCONTINUED | OUTPATIENT
Start: 2018-02-15 | End: 2018-02-16

## 2018-02-15 RX ORDER — CARVEDILOL 12.5 MG/1
12.5 TABLET ORAL 2 TIMES DAILY
Status: DISCONTINUED | OUTPATIENT
Start: 2018-02-15 | End: 2018-02-21

## 2018-02-15 RX ORDER — CHLORTHALIDONE 25 MG/1
25 TABLET ORAL DAILY
Status: DISCONTINUED | OUTPATIENT
Start: 2018-02-15 | End: 2018-02-24

## 2018-02-15 RX ORDER — HYDRALAZINE HYDROCHLORIDE 20 MG/ML
10 INJECTION INTRAMUSCULAR; INTRAVENOUS
Status: DISCONTINUED | OUTPATIENT
Start: 2018-02-15 | End: 2018-02-21

## 2018-02-15 RX ORDER — LABETALOL HYDROCHLORIDE 5 MG/ML
5 INJECTION, SOLUTION INTRAVENOUS ONCE
Status: COMPLETED | OUTPATIENT
Start: 2018-02-15 | End: 2018-02-15

## 2018-02-15 RX ADMIN — LISINOPRIL 20 MG: 20 TABLET ORAL at 08:12

## 2018-02-15 RX ADMIN — CHLORTHALIDONE 25 MG: 25 TABLET ORAL at 08:12

## 2018-02-15 RX ADMIN — FAMOTIDINE 20 MG: 20 TABLET, FILM COATED ORAL at 21:19

## 2018-02-15 RX ADMIN — CARVEDILOL 12.5 MG: 12.5 TABLET, FILM COATED ORAL at 08:12

## 2018-02-15 RX ADMIN — LABETALOL HYDROCHLORIDE 5 MG: 5 INJECTION INTRAVENOUS at 03:21

## 2018-02-15 RX ADMIN — FAMOTIDINE 20 MG: 20 TABLET, FILM COATED ORAL at 08:12

## 2018-02-15 RX ADMIN — ASPIRIN 81 MG 81 MG: 81 TABLET ORAL at 08:12

## 2018-02-15 RX ADMIN — DOCUSATE SODIUM 100 MG: 100 CAPSULE, LIQUID FILLED ORAL at 08:12

## 2018-02-15 RX ADMIN — CARVEDILOL 12.5 MG: 12.5 TABLET, FILM COATED ORAL at 18:00

## 2018-02-15 RX ADMIN — ATORVASTATIN CALCIUM 20 MG: 40 TABLET, FILM COATED ORAL at 21:19

## 2018-02-15 RX ADMIN — LABETALOL HYDROCHLORIDE 5 MG: 5 INJECTION INTRAVENOUS at 07:19

## 2018-02-15 NOTE — PROGRESS NOTES
Bedside shift change report given to Bipin Ling RN (oncoming nurse) by Glen Whiting RN (offgoing nurse). Report included the following information SBAR, Kardex, ED Summary, Procedure Summary, MAR, Accordion, Recent Results and Cardiac Rhythm NSR.

## 2018-02-15 NOTE — PROGRESS NOTES
Problem: Mobility Impaired (Adult and Pediatric)  Goal: *Acute Goals and Plan of Care (Insert Text)  Physical Therapy Goals  Initiated 2/14/2018  1. Patient will move from supine to sit and sit to supine  in bed with modified independence within 7 day(s). 2.  Patient will transfer from bed to chair and chair to bed with supervision/set-up using the least restrictive device within 7 day(s). 3.  Patient will perform sit to stand with supervision/set-up within 7 day(s). 4.  Patient will ambulate with minimal assistance/contact guard assist for 150 feet with the least restrictive device within 7 day(s). 5.  Patient will ascend/descend 6 stairs with 1 handrail(s) with minimal assistance/contact guard assist within 7 day(s). 6.  Patient will improve [place] Balance score by 7 points within 7 days. physical Therapy TREATMENT  Patient: Amy Daily (50 y.o. male)  Date: 2/15/2018  Diagnosis: Left-sided weakness  Hypertensive urgency  Acute CVA (cerebrovascular accident) ([facility] [address]  Hypertensive urgency  Abnormal EKG <principal problem not specified>       Precautions:    Chart, physical therapy assessment, plan of care and goals were reviewed. ASSESSMENT:  Cleared for mobility progression by RN. Received supine in bed and highly motivated to work with therapy. Continues to report mild R hip pain however able to bear full weight w/o increase. He was able to mobilize to EOB (from flat position) with SBA and automatic integration of compensatory strategies to assist L LE noted. Demos good sitting balance and no c/o dizziness. Progressed to completing sit<>stands x4 consecutive reps for functional strengthening and neuroplasticity principles - cues for symmetrical weight distribution and integrating L UE to push from chair. Manual feedback provided to reduce L knee hyperextension in stance.  Pre gait activities initiated to include L LE fwd placement/back x5 reps with emphasis on clearing from floor and obtaining appropriate placement (mild ataxia noted). Gait training progressed with MITCHELL HHA (mod A x2) x30ft - facilitation to maintain even stance (vs shifted R), achieving appropriate L foot placement, and avoiding L knee hyperextension. Fatigues quickly with multiple knee obdulio noted. Following seated rest break, completed mini squats x5 reps (emphasis on no hyperextention) and instructed in seated there ex to do on own throughout the day. Re-educated on BE FAST, use of call light, and to not self transfer. Remained up in chair at end of session, NAD, RN aware. Pt remains significantly below his independent baseline - strongly recommend discharge to acute IP rehab - he is very motivated and can tolerate 3 hrs of therapy. Progression toward goals:  [x]       Improving appropriately and progressing toward goals  []       Improving slowly and progressing toward goals  []       Not making progress toward goals and plan of care will be adjusted     PLAN:  Patient continues to benefit from skilled intervention to address the above impairments. Continue treatment per established plan of care. Discharge Recommendations:  Inpatient Rehab  Further Equipment Recommendations for Discharge:  TBD     SUBJECTIVE:   Patient stated I am good.     OBJECTIVE DATA SUMMARY:   Critical Behavior:  Neurologic State: Alert  Orientation Level: Oriented X4  Cognition: Follows commands  Safety/Judgement: Awareness of environment, Insight into deficits, Fall prevention  Functional Mobility Training:  Bed Mobility:     Supine to Sit: Stand-by asssistance     Transfers:  Sit to Stand: Minimum assistance  Stand to Sit: Minimum assistance        Balance:  Sitting: Impaired; Without support  Sitting - Static: Good (unsupported)  Sitting - Dynamic: Fair (occasional)  Standing: Impaired; With support  Standing - Static: Fair;Constant support  Standing - Dynamic : Poor  Ambulation/Gait Training:  Distance (ft): 30 Feet (ft)  Assistive Device: Gait belt (B HHA)  Ambulation - Level of Assistance: Assist x2; Moderate assistance  Gait Abnormalities: Antalgic; Ataxic; Altered arm swing; Foot drop; Hemiplegic; Step to gait;Trunk sway increased  Stance: Left decreased;Right increased  Speed/Estrellita: Pace decreased (<100 feet/min); Shuffled  Step Length: Left lengthened  Swing Pattern: Left asymmetrical    Pain Scale 1: Numeric (0 - 10)  Pain Intensity 1: 0     Activity Tolerance:   NAD    Please refer to the flowsheet for vital signs taken during this treatment. After treatment:   [x] Patient left in no apparent distress sitting up in chair  [] Patient left in no apparent distress in bed  [x] Call bell left within reach  [x] Nursing notified  [] Caregiver present  [] Bed alarm activated    COMMUNICATION/EDUCATION:   The patients plan of care was discussed with: Registered Nurse. Patient was educated regarding His deficit(s) of L hemiparesis as this relates to His diagnosis of CVA. He demonstrated Good understanding as evidenced by verbalizing. Patient and/or family was verbally educated on the BE FAST acronym for signs/symptoms of CVA and TIA. BE FAST was written on patient's communication board  for visual education and reinforcement. All questions answered with patient indicating good understanding. [x]  Fall prevention education was provided and the patient/caregiver indicated understanding. [x]  Patient/family have participated as able in goal setting and plan of care. [x]  Patient/family agree to work toward stated goals and plan of care. []  Patient understands intent and goals of therapy, but is neutral about his/her participation. []  Patient is unable to participate in goal setting and plan of care.     Thank you for this referral.  Sanjiv Connolly, PT, DPT    Time Calculation: 24 mins

## 2018-02-15 NOTE — PROGRESS NOTES
[facility] Progress Note    2/19/2018 7:30 AM   Admit Date: 2/13/2018  Admit Diagnosis: Left-sided weakness; Hypertensive urgency; Acute CVA (cerebro*    Assessment/Plan     We will sign off at this point. Please feel free to contact us for any future problems. He needs close follow up with [facility] at discharge. No need for cardiology follow up. Thank you for allowing us to participate in the care of Robert Wooten. 1.  Hypertensive emergency - /130 on admission, eats salty foods and has not taken BP medications for years  -off IV Cardene infusion today and remains hypertensive on lisinopril 20mg daily and coreg 12.5mg BID, will begin chlorthalidone 25mg daily today  -ordered hydralazine 10mg IV every 6 hours PRN SBP > 180mmHg  -he has not seen a PCP in several years, does not have insurance, will follow up with [facility] at discharge  -advised him of importance of limiting sodium in his diet again, discussed the importance of blood pressure control again  -no aortic dissection or aneurysm by CT scan  2. Left sided weakness - seen by neurology, brain MRI shows acute infarction, getting PT/OT  -not a candidate for tPA on admission per neurology  -management per neurology   3. Abnormal ECG - he denies any chest pain or dyspnea, appears to be NSR with LVH with strain, TTE showed normal LVEF and LVH   4. Elevated troponin - non-specific troponin elevation likely due to HTN, abnormal ECG with LVH with strain, TTE showed normal LVEF and LVH  5. Acute CVA - Brain MRI showed acute non-hemorrhagic right deep cerebral infarct/ischemia and extensive chronic ischemic findings  -HTN management as above, continue ASA and statin  -further management per neurology  6. DM Type 2 - A1c 6.6%, management per IM  7.   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 His strength in his left side is improving, still reports slight weakness in left leg today. Discussed the need to take BP medications and limit sodium and he is agreeable. Discussed the need for close follow-up with [facility] once discharged. Objective:      Physical Exam:  Visit Vitals    /79 (BP 1 Location: Right arm, BP Patient Position: At rest)    Pulse 64    Temp 98.3 °F (36.8 °C)    Resp 12    Ht 5' 11\" (1.803 m)    Wt 205 lb 12.8 oz (93.4 kg)    SpO2 97%    BMI 28.7 kg/m2     General Appearance:  Well developed, well nourished, alert and oriented x 3, in no acute distress. Ears/Nose/Mouth/Throat:   Hearing grossly normal.         Neck: Supple. Chest:   Lungs clear to auscultation bilaterally. Cardiovascular:  Regular rate and rhythm, S1, S2 normal, no murmur. Abdomen:   Soft, non-tender, bowel sounds are active. Extremities: No edema bilaterally. Equal strength in bilateral UE, left LE slightly weaker than right LE   Skin: Warm and dry.            Telemetry: normal sinus rhythm    Data Review:   Labs:    Recent Results (from the past 24 hour(s))   CBC WITH AUTOMATED DIFF    Collection Time: 02/19/18  4:31 AM   Result Value Ref Range    WBC 7.9 4.1 - 11.1 K/uL    RBC 5.90 (H) 4.10 - 5.70 M/uL    HGB 15.4 12.1 - 17.0 g/dL    HCT 47.6 36.6 - 50.3 %    MCV 80.7 80.0 - 99.0 FL    MCH 26.1 26.0 - 34.0 PG    MCHC 32.4 30.0 - 36.5 g/dL    RDW 13.6 11.5 - 14.5 %    PLATELET 897 335 - 822 K/uL    MPV 10.9 8.9 - 12.9 FL    NRBC 0.0 0  WBC    ABSOLUTE NRBC 0.00 0.00 - 0.01 K/uL    NEUTROPHILS 44 32 - 75 %    LYMPHOCYTES 41 12 - 49 %    MONOCYTES 11 5 - 13 %    EOSINOPHILS 2 0 - 7 %    BASOPHILS 1 0 - 1 %    IMMATURE GRANULOCYTES 1 (H) 0.0 - 0.5 %    ABS. NEUTROPHILS 3.5 1.8 - 8.0 K/UL    ABS. LYMPHOCYTES 3.2 0.8 - 3.5 K/UL    ABS. MONOCYTES 0.9 0.0 - 1.0 K/UL    ABS. EOSINOPHILS 0.2 0.0 - 0.4 K/UL    ABS. BASOPHILS 0.1 0.0 - 0.1 K/UL    ABS. IMM. GRANS. 0.1 (H) 0.00 - 0.04 K/UL    DF AUTOMATED     METABOLIC PANEL, COMPREHENSIVE    Collection Time: 02/19/18  4:31 AM   Result Value Ref Range    Sodium 137 136 - 145 mmol/L    Potassium 4.2 3.5 - 5.1 mmol/L    Chloride 104 97 - 108 mmol/L    CO2 24 21 - 32 mmol/L    Anion gap 9 5 - 15 mmol/L    Glucose 120 (H) 65 - 100 mg/dL    BUN 38 (H) 6 - 20 MG/DL    Creatinine 1.61 (H) 0.70 - 1.30 MG/DL    BUN/Creatinine ratio 24 (H) 12 - 20      GFR est AA 56 (L) >60 ml/min/1.73m2    GFR est non-AA 46 (L) >60 ml/min/1.73m2    Calcium 9.1 8.5 - 10.1 MG/DL    Bilirubin, total 0.5 0.2 - 1.0 MG/DL    ALT (SGPT) 30 12 - 78 U/L    AST (SGOT) 17 15 - 37 U/L    Alk.  phosphatase 110 45 - 117 U/L    Protein, total 7.7 6.4 - 8.2 g/dL    Albumin 3.5 3.5 - 5.0 g/dL    Globulin 4.2 (H) 2.0 - 4.0 g/dL    A-G Ratio 0.8 (L) 1.1 - 2.2             Current Facility-Administered Medications   Medication Dose Route Frequency    hydrALAZINE (APRESOLINE) 20 mg/mL injection 10 mg  10 mg IntraVENous Q6H PRN    chlorthalidone (HYGROTEN) tablet 25 mg  25 mg Oral DAILY    carvedilol (COREG) tablet 12.5 mg  12.5 mg Oral BID    atorvastatin (LIPITOR) tablet 20 mg  20 mg Oral QHS    acetaminophen (TYLENOL) tablet 650 mg  650 mg Oral Q4H PRN    Or    acetaminophen (TYLENOL) solution 650 mg  650 mg Per NG tube Q4H PRN    Or    acetaminophen (TYLENOL) suppository 650 mg  650 mg Rectal Q4H PRN    ondansetron (ZOFRAN) injection 4 mg  4 mg IntraVENous Q6H PRN    aspirin chewable tablet 81 mg  81 mg Oral DAILY    docusate sodium (COLACE) capsule 100 mg  100 mg Oral BID    bisacodyl (DULCOLAX) tablet 5 mg  5 mg Oral DAILY PRN    famotidine (PEPCID) tablet 20 mg  20 mg Oral Q12H    niCARdipine (CARDENE) 25 mg in 0.9% sodium chloride 250 mL infusion  5 mg/hr IntraVENous CONTINUOUS       Robby Cervantes MD  [facility] [address] [place] [address] 13, [address] [facility] [address]  Bess Patton  [phone]

## 2018-02-15 NOTE — PROGRESS NOTES
Hospitalist Progress Note          Ana Logan MD  Please call  and page for questions. Call physician on-call through the  7pm-7am    Daily Progress Note: 2/15/2018    Primary care provider:None    Date of admission: 2/13/2018 12:39 PM    Admission summery and hospital course:  Kari Lee is a 50 y. o. male who presents with PMHx of HTN, noncompliance to medication, unable to say when he last took BP meds, admitted for left sided weakness. Pt states that he noticed it last night involving arm and leg. When asked why he did not seek medical attention last night, he said \"it could wait until morning\". Subjective:   Patient said he is feeling better today. He said his left leg is still very weak. Assessment/Plan:   Left Hemiparesis likely due to Acute CVA  - ASA, add statin  - PT/OT/ST  - Neurology and cardiology input appreciated. - CTA head/neck- Multifocal areas of presumably remote ischemia in the basal ganglia. -MRI showed acute nonhemorrhagic right deep cerebral white matter infarct/ischemia and extensive chronic ischemic findings. - permissive hypertension for 24 hours       Hypertensive Urgency  - Continue lisinopril and Coreg and hydralazin as PRN.     Abnormal EKG/Elevated troponin:   - CT chest/abd/pel without acute finding. Appreciated cardiology input.   - ECHO with normal EF. See orders for other plans. VTE prophylaxis: SCD  Code status: Full  Discussed plan of care with Patient/Family and Nurse. Pre-admission lived at home. Discharge planning: pending. Continue PT/OT, he needs rehab.         Review of Systems:     Review of Systems:  Symptom  Y/N  Comments   Symptom  Y/N  Comments    Fever/Chills  n    Chest Pain  n    Poor Appetite  n    Edema  n     Cough  n   Abdominal Pain  n     Sputum  n   Joint Pain      SOB/STREET  n   Pruritis/Rash      Nausea/vomit     Tolerating PT/OT      Diarrhea     Tolerating Diet Constipation     Other      Could not obtain due to:         Objective:   Physical Exam:     Visit Vitals    /80 (BP 1 Location: Right arm, BP Patient Position: At rest)    Pulse 76    Temp 98.3 °F (36.8 °C)    Resp 16    Ht 5' 11\" (1.803 m)    Wt 91.3 kg (201 lb 3.2 oz)    SpO2 100%    BMI 28.06 kg/m2      O2 Device: Room air    Temp (24hrs), Av.1 °F (36.7 °C), Min:97.7 °F (36.5 °C), Max:98.4 °F (36.9 °C)    02/15 07 - 02/15 190  In: 480 [P.O.:480]  Out: 1500 [Urine:1500]   1901 - 02/15 0700  In: 920 [P.O.:920]  Out: 3200 [Urine:3200]      General:  Alert, cooperative, no distress, appears stated age. Lungs:   Clear to auscultation bilaterally. Chest wall:  No tenderness or deformity. Heart:  Regular rate and rhythm, S1, S2 normal, no murmur, click, rub or gallop. Abdomen:   Soft, non-tender. Bowel sounds normal.    Extremities: Extremities normal, atraumatic, no cyanosis or edema. Pulses: 2+ and symmetric all extremities. Skin: Skin color, texture, turgor normal. No rashes or lesions   Neurologic: CNII-XII intact. LLE strength is 4/5     Data Review:       Recent Days:  Recent Labs      18   1251   WBC  10.5   HGB  15.6   HCT  47.7   PLT  284     Recent Labs      02/15/18   0315  18   0307  18   1554  18   1251  18   1239   NA  138  138   --   137   --    K  3.9  3.5   --   3.7   --    CL  104  104   --   104   --    CO2  27  24   --   25   --    GLU  115*  112*   --   119*   --    BUN  12  9   --   12   --    CREA  1.04  0.96   --   1.11   --    CA  8.6  8.5   --   9.4   --    MG  2.0  1.9  1.6   --    --    ALB   --    --    --   3.9   --    SGOT   --    --    --   22   --    ALT   --    --    --   25   --    INR   --    --    --    --   1.0     No results for input(s): PH, PCO2, PO2, HCO3, FIO2 in the last 72 hours.     24 Hour Results:  Recent Results (from the past 24 hour(s))   MAGNESIUM    Collection Time: 02/15/18  3:15 AM   Result Value Ref Range    Magnesium 2.0 1.6 - 2.4 mg/dL   METABOLIC PANEL, BASIC    Collection Time: 02/15/18  3:15 AM   Result Value Ref Range    Sodium 138 136 - 145 mmol/L    Potassium 3.9 3.5 - 5.1 mmol/L    Chloride 104 97 - 108 mmol/L    CO2 27 21 - 32 mmol/L    Anion gap 7 5 - 15 mmol/L    Glucose 115 (H) 65 - 100 mg/dL    BUN 12 6 - 20 MG/DL    Creatinine 1.04 0.70 - 1.30 MG/DL    BUN/Creatinine ratio 12 12 - 20      GFR est AA >60 >60 ml/min/1.73m2    GFR est non-AA >60 >60 ml/min/1.73m2    Calcium 8.6 8.5 - 10.1 MG/DL       Problem List:  Problem List as of 2/15/2018  Never Reviewed          Codes Class Noted - Resolved    Left-sided weakness ICD-10-CM: R53.1  ICD-9-CM: 728.87  2/13/2018 - Present        Hypertensive urgency ICD-10-CM: I16.0  ICD-9-CM: 401.9  2/13/2018 - Present        Abnormal EKG ICD-10-CM: R94.31  ICD-9-CM: 794.31  2/13/2018 - Present        Acute CVA (cerebrovascular accident) [facility]) ICD-10-CM: I63.9  ICD-9-CM: 434.91  2/13/2018 - Present              Medications reviewed  Current Facility-Administered Medications   Medication Dose Route Frequency    hydrALAZINE (APRESOLINE) 20 mg/mL injection 10 mg  10 mg IntraVENous Q6H PRN    chlorthalidone (HYGROTEN) tablet 25 mg  25 mg Oral DAILY    carvedilol (COREG) tablet 12.5 mg  12.5 mg Oral BID    lisinopril (PRINIVIL, ZESTRIL) tablet 20 mg  20 mg Oral DAILY    atorvastatin (LIPITOR) tablet 20 mg  20 mg Oral QHS    acetaminophen (TYLENOL) tablet 650 mg  650 mg Oral Q4H PRN    Or    acetaminophen (TYLENOL) solution 650 mg  650 mg Per NG tube Q4H PRN    Or    acetaminophen (TYLENOL) suppository 650 mg  650 mg Rectal Q4H PRN    ondansetron (ZOFRAN) injection 4 mg  4 mg IntraVENous Q6H PRN    aspirin chewable tablet 81 mg  81 mg Oral DAILY    docusate sodium (COLACE) capsule 100 mg  100 mg Oral BID    bisacodyl (DULCOLAX) tablet 5 mg  5 mg Oral DAILY PRN    famotidine (PEPCID) tablet 20 mg  20 mg Oral Q12H    niCARdipine (CARDENE) 25 mg in 0.9% sodium chloride 250 mL infusion  5 mg/hr IntraVENous CONTINUOUS       Care Plan discussed with: Patient/Family, Nurse and     Total time spent with patient: 30 minutes.     Martín Wood MD

## 2018-02-15 NOTE — PROGRESS NOTES
Patient listed as not having a primary care physician. Hospital follow-up PCP transitional care appointment has been scheduled with Dr. Paul Goodman for Wednesday, 2/21/18 at 11:00 a.m. Patient needs to arrive 15 minutes early with picture ID and a listing of all medications. Pending patient discharge.   Vidya Rhodes, Care Management Specialist.

## 2018-02-15 NOTE — PROGRESS NOTES
CM received message in [employer] from Maranda Galvan with [address]- since patient is not a [place] Citizen, agency unable to accept patient to a aleyda bed. CM will continue to follow. BEN Waller CM spoke with Marizaeminubia Astrid with [facility] and she stated she was coming to review patient and attempt to get a copy of the patient's green card in order to consider him for a aleyda bed at inpatient rehab. CM met with patient at bedside to inform him and he verbalized appreciation. CM will continue to follow. BEN Waller CM spoke with Delta Resendiz with [facility] and she stated she requested the patient to have family member bring his [employer] Card to the hospital- agency will have to verify/have copy of green card to consider patient for Inpatient Rehab- CM sent referral to [facility] in order to screen him for any state/federal assistance programs. Facility also requesting that the patient have PCP follow-up- currently no PCP, will speak with patient regarding [facility] PCP (patient set-up with [facility]). Facility also requesting an approved [address] [place] [address] willing to accept patient. BEN Waller CM sent referral to [facility] via [facility]- facility unable to commit to seeing patient once discharged from Inpatient Rehab because they do not know his needs after completing rehab- Ziggy Patino, Liaison with [facility], stated CM from Inpatient Rehab can reach out and send referral if needed when patient enters rehab. CM sent request to Netta Nettles Caro, Rd Specialist to assist in finding patient PCP- CM met with patient at bedside and agreeable to see PCP with [facility]. CM will continue to follow.  BEN Waller

## 2018-02-15 NOTE — PROGRESS NOTES
Problem: Falls - Risk of  Goal: *Absence of Falls  Document Avinash Fall Risk and appropriate interventions in the flowsheet.    Outcome: Progressing Towards Goal  Fall Risk Interventions:            Medication Interventions: Assess postural VS orthostatic hypotension, Evaluate medications/consider consulting pharmacy, Patient to call before getting OOB, Teach patient to arise slowly

## 2018-02-15 NOTE — INTERDISCIPLINARY ROUNDS
IDR/SLIDR Summary          Patient: Elena Salazar MRN: [id number]    Age: 50 y.o. YOB: 1970 Room/Bed: [facility]   Admit Diagnosis: Left-sided weakness  Hypertensive urgency  Acute CVA (cerebrovascular accident) ([facility] [address]  Hypertensive urgency  Abnormal EKG  Principal Diagnosis: <principal problem not specified>   Goals: Safety, PT/OT, BP management, d/c planning  Readmission: NO  Quality Measure: Not applicable  VTE Prophylaxis: Mechanical  Influenza Vaccine screening completed? YES  Pneumococcal Vaccine screening completed? NO  Mobility needs: No   Nutrition plan:Yes  Consults:P.T, O.T. and Case Management    Financial concerns:No  Escalated to CM? YES  RRAT Score: 8   Interventions:  Testing due for pt today?  YES  LOS: 2 days Expected length of stay 3-4 days  Discharge plan: Inpatient rehab   PCP: None  Transportation needs: No    Days before discharge:two or more days before discharge   Discharge disposition: Rehab    Blank Dickerson RN  2/15/2018  747 AM

## 2018-02-15 NOTE — PROGRESS NOTES
Bedside shift change report given to Ethan Lopez RN (oncoming nurse) by Trisha Alvarez RN (offgoing nurse). Report included the following information SBAR, Kardex, Intake/Output, MAR, Accordion, Recent Results and Cardiac Rhythm NSR.

## 2018-02-15 NOTE — PROGRESS NOTES
Bedside shift change report given to Glen Whiting RN (oncoming nurse) by Bipin Ling RN (offgoing nurse). Report included the following information SBAR, Kardex, Intake/Output, MAR, Accordion, Recent Results and Cardiac Rhythm NSR.

## 2018-02-15 NOTE — PROGRESS NOTES
Pt has visitor bringing pt's green card to hospital tonight. Friend will also help pt fill out appropriate paperwork tonight.

## 2018-02-15 NOTE — PROGRESS NOTES
Neurology Progress Note     NAME: Kiko Lobato   :  1970   MRN:  [id number]   DATE:  2/15/2018    Assessment:     Active Problems:    Left-sided weakness (2018)      Hypertensive urgency (2018)      Abnormal EKG (2018)      Acute CVA (cerebrovascular accident) ([address] (2018)      Pt is a 48yo RH male presenting with uncontrolled hypertension and left arm and leg weakness, concerning for right subcortical infarction, with CT of the head revealing multiple hypodensities concerning for prior infarction, though the patient denies any known history of stroke. Additionally, he has been found to have abnormal glucose metabolism and hypercholesterolemia. MRI brain wo contrast confirms acute right periventricular white matter ischemic CVA. Multiple old lacunar infarcts in bilateral cerebral white matter, alex, and cerebellum. Multiple punctate areas of hemosiderin deposit. UDS was negative  Plan:   -Continue ASA 81mg daily  -Continue Lipitor 20mg daily  -Continue treatment for HTN  -Diabetic education  -PT/OT  -Please call with questions. Subjective:   Pt reports slightly improvement in left sided weakness. No new sxs. Reviewed MRI results with him and stressed importance of taking meds and taking care of his health.      Objective:   Chart reviewed since last seen  Current Facility-Administered Medications   Medication Dose Route Frequency    hydrALAZINE (APRESOLINE) 20 mg/mL injection 10 mg  10 mg IntraVENous Q6H PRN    chlorthalidone (HYGROTEN) tablet 25 mg  25 mg Oral DAILY    carvedilol (COREG) tablet 12.5 mg  12.5 mg Oral BID    lisinopril (PRINIVIL, ZESTRIL) tablet 20 mg  20 mg Oral DAILY    atorvastatin (LIPITOR) tablet 20 mg  20 mg Oral QHS    acetaminophen (TYLENOL) tablet 650 mg  650 mg Oral Q4H PRN    Or    acetaminophen (TYLENOL) solution 650 mg  650 mg Per NG tube Q4H PRN    Or    acetaminophen (TYLENOL) suppository 650 mg  650 mg Rectal Q4H PRN    ondansetron (ZOFRAN) injection 4 mg  4 mg IntraVENous Q6H PRN    aspirin chewable tablet 81 mg  81 mg Oral DAILY    docusate sodium (COLACE) capsule 100 mg  100 mg Oral BID    bisacodyl (DULCOLAX) tablet 5 mg  5 mg Oral DAILY PRN    famotidine (PEPCID) tablet 20 mg  20 mg Oral Q12H    niCARdipine (CARDENE) 25 mg in 0.9% sodium chloride 250 mL infusion  5 mg/hr IntraVENous CONTINUOUS       Visit Vitals    BP (!) 174/91 (BP 1 Location: Right arm, BP Patient Position: At rest)    Pulse 75    Temp 98 °F (36.7 °C)    Resp 15    Ht 5' 11\" (1.803 m)    Wt 91.3 kg (201 lb 3.2 oz)    SpO2 100%    BMI 28.06 kg/m2     Temp (24hrs), Av °F (36.7 °C), Min:97.7 °F (36.5 °C), Max:98.4 °F (36.9 °C)          1901 - 02/15 0700  In: 920 [P.O.:920]  Out: 3200 [Urine:3200]      Physical Exam:  General: Well developed well nourished patient in no apparent distress. Cardiac: Regular rate and rhythm with no murmurs. Extremities: 2+ Radial pulses, no cyanosis or edema    Neurological Exam:  Mental Status: Oriented to time, place and person. Speech and language intact. Attention and fund of knowledge appropriate. Normal recent and remote memory. Cranial Nerves:   No facial asym or ptosis, EOMI   Motor:  4+/5 left /bicep, +PD on left, left HF 4/5, unable to DF, 4/5 PF   Reflexes:   Deep tendon reflexes 2+ and symmetric.    Sensory:      Gait:  Not assessed today, needs assistance   Cerebellar:  Dysmetria with FTN on left         Lab Review   Recent Results (from the past 24 hour(s))   DRUG SCREEN, URINE    Collection Time: 18  1:31 PM   Result Value Ref Range    AMPHETAMINES NEGATIVE  NEG      BARBITURATES NEGATIVE  NEG      BENZODIAZEPINES NEGATIVE  NEG      COCAINE NEGATIVE  NEG      METHADONE NEGATIVE  NEG      OPIATES NEGATIVE  NEG      PCP(PHENCYCLIDINE) NEGATIVE  NEG THC (TH-CANNABINOL) NEGATIVE  NEG      Drug screen comment (NOTE)    MAGNESIUM    Collection Time: 02/15/18  3:15 AM   Result Value Ref Range    Magnesium 2.0 1.6 - 2.4 mg/dL   METABOLIC PANEL, BASIC    Collection Time: 02/15/18  3:15 AM   Result Value Ref Range    Sodium 138 136 - 145 mmol/L    Potassium 3.9 3.5 - 5.1 mmol/L    Chloride 104 97 - 108 mmol/L    CO2 27 21 - 32 mmol/L    Anion gap 7 5 - 15 mmol/L    Glucose 115 (H) 65 - 100 mg/dL    BUN 12 6 - 20 MG/DL    Creatinine 1.04 0.70 - 1.30 MG/DL    BUN/Creatinine ratio 12 12 - 20      GFR est AA >60 >60 ml/min/1.73m2    GFR est non-AA >60 >60 ml/min/1.73m2    Calcium 8.6 8.5 - 10.1 MG/DL       Additional comments:  I have reviewed the patient's new clinical lab test results. I have personally reviewed the patient's radiographs. MRI   MRI Results (most recent):    Results from [place] encounter on 02/13/18   MRI BRAIN WO CONT   Narrative INDICATION:  Left sided weakness, r/o CVA     MRI of the brain is performed without contrast.    Diffusion imaging shows acute infarct/ischemia in the right deep cerebral white  matter measuring 10 x 5 mm without hemorrhage or mass effect. Numerous bilateral T2 hyperintensities of the cerebral white matter are most  compatible with chronic microangiopathy. There are deep white matter lacunes,  lacune in the left alex, and in the bilateral cerebellum. There are punctate foci of hemosiderin deposition in the cerebral hemispheres  but no acute hemorrhage. There is no mass, shift, hydrocephalus or extra-axial fluid collection. Impression IMPRESSION:  1. Acute nonhemorrhagic right deep cerebral white matter infarct/ischemia. 2. Extensive chronic ischemic findings. Care Plan discussed with:  Patient x   Family    RN    Care Manager    Consultant/Specialist:       Signed: Sirisha Driscoll MD
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
## 2018-02-16 LAB
ANION GAP SERPL CALC-SCNC: 9 MMOL/L (ref 5–15)
BUN SERPL-MCNC: 16 MG/DL (ref 6–20)
BUN/CREAT SERPL: 16 (ref 12–20)
CALCIUM SERPL-MCNC: 8.9 MG/DL (ref 8.5–10.1)
CHLORIDE SERPL-SCNC: 104 MMOL/L (ref 97–108)
CO2 SERPL-SCNC: 25 MMOL/L (ref 21–32)
CREAT SERPL-MCNC: 1 MG/DL (ref 0.7–1.3)
GLUCOSE SERPL-MCNC: 117 MG/DL (ref 65–100)
MAGNESIUM SERPL-MCNC: 1.9 MG/DL (ref 1.6–2.4)
POTASSIUM SERPL-SCNC: 3.9 MMOL/L (ref 3.5–5.1)
SODIUM SERPL-SCNC: 138 MMOL/L (ref 136–145)

## 2018-02-16 PROCEDURE — 97530 THERAPEUTIC ACTIVITIES: CPT

## 2018-02-16 PROCEDURE — 74011250636 HC RX REV CODE- 250/636: Performed by: NURSE PRACTITIONER

## 2018-02-16 PROCEDURE — 97535 SELF CARE MNGMENT TRAINING: CPT

## 2018-02-16 PROCEDURE — 36415 COLL VENOUS BLD VENIPUNCTURE: CPT | Performed by: NURSE PRACTITIONER

## 2018-02-16 PROCEDURE — 65660000000 HC RM CCU STEPDOWN

## 2018-02-16 PROCEDURE — 74011250637 HC RX REV CODE- 250/637: Performed by: NURSE PRACTITIONER

## 2018-02-16 PROCEDURE — 80048 BASIC METABOLIC PNL TOTAL CA: CPT | Performed by: NURSE PRACTITIONER

## 2018-02-16 PROCEDURE — 74011250637 HC RX REV CODE- 250/637: Performed by: HOSPITALIST

## 2018-02-16 PROCEDURE — 83735 ASSAY OF MAGNESIUM: CPT | Performed by: NURSE PRACTITIONER

## 2018-02-16 RX ADMIN — ATORVASTATIN CALCIUM 20 MG: 40 TABLET, FILM COATED ORAL at 21:51

## 2018-02-16 RX ADMIN — CHLORTHALIDONE 25 MG: 25 TABLET ORAL at 09:22

## 2018-02-16 RX ADMIN — LISINOPRIL 20 MG: 20 TABLET ORAL at 09:27

## 2018-02-16 RX ADMIN — CARVEDILOL 12.5 MG: 12.5 TABLET, FILM COATED ORAL at 09:27

## 2018-02-16 RX ADMIN — HYDRALAZINE HYDROCHLORIDE 10 MG: 20 INJECTION INTRAMUSCULAR; INTRAVENOUS at 11:43

## 2018-02-16 RX ADMIN — ASPIRIN 81 MG 81 MG: 81 TABLET ORAL at 09:22

## 2018-02-16 RX ADMIN — FAMOTIDINE 20 MG: 20 TABLET, FILM COATED ORAL at 21:51

## 2018-02-16 RX ADMIN — DOCUSATE SODIUM 100 MG: 100 CAPSULE, LIQUID FILLED ORAL at 09:22

## 2018-02-16 RX ADMIN — CARVEDILOL 12.5 MG: 12.5 TABLET, FILM COATED ORAL at 18:25

## 2018-02-16 RX ADMIN — FAMOTIDINE 20 MG: 20 TABLET, FILM COATED ORAL at 09:22

## 2018-02-16 NOTE — PROGRESS NOTES
CM called and left a voicemail message for Dave Logan [place] with [facility], in order to discuss the possibility of agency accepting the patient in a aleyda bed. CM will continue to follow. BEN Escudero CM met with the patient at bedside and he has his green card at bedside- has yet to complete the application for aleyda bed for [facility], stated he is having difficulty writing, however, he ensured a friend would be coming to write for him who he felt comfortable with. CM met with Dave Logan, Liaison with [facility], and Dave Logan stated that [facility] is awaiting approval for their financial team to accept patient for aleyda. [facility] will continue to follow the patient and await approval for aleyda bed- patient is aware that paperwork needs to be completed in order to be eligible, friend coming to complete on behalf of patient. MD notified, CM will continue to follow. BEN Escudero CM met with patient at bedside and he gave consent for the CM to assist in filling out the [facility] application since he is having a hard time writing. The patient does not have a bank account, however, states he does have a 1099 at home and the CM told him that needs to be brought in for his application, he verbalized understanding. The patient called and provided update to Dave Logan, Liaison with [facility] to provide update regarding the patient's financial information. [facility] will continue to review.  BEN Escudero

## 2018-02-16 NOTE — ROUTINE PROCESS
Bedside and Verbal shift change report given to Laurie Dodd RN (oncoming nurse) by Marysol Rojas RN (offgoing nurse). Report included the following information SBAR, Kardex, Intake/Output, MAR, Accordion, Recent Results and Cardiac Rhythm NSR.

## 2018-02-16 NOTE — INTERDISCIPLINARY ROUNDS
IDR/SLIDR Summary          Patient: Noemi Vitale MRN: [id number]    Age: 50 y.o. YOB: 1970 Room/Bed: [facility]   Admit Diagnosis: Left-sided weakness  Hypertensive urgency  Acute CVA (cerebrovascular accident) ([facility] [address]  Hypertensive urgency  Abnormal EKG  Principal Diagnosis: <principal problem not specified>   Goals: Safety, PT/OT, BP management, d/c planning  Readmission: NO  Quality Measure: Not applicable  VTE Prophylaxis: Mechanical  Influenza Vaccine screening completed? YES  Pneumococcal Vaccine screening completed? NO  Mobility needs: No   Nutrition plan:Yes  Consults:P.T, O.T. and Case Management    Financial concerns:No  Escalated to CM? YES  RRAT Score: 8   Interventions:  Testing due for pt today?  YES  LOS: 7 days Expected length of stay 3-4 days  Discharge plan: Inpatient rehab   PCP: None  Transportation needs: No    Days before discharge:two or more days before discharge   Discharge disposition: Rehab    Signed:

## 2018-02-16 NOTE — PROGRESS NOTES
Spiritual Care Partner Volunteer visited patient in room 661/01 on 2.16.18. Documented by: : Rev. Nickolas Iverson.  April Cooper; [facility], to contact [phone] [address] call: [phone]

## 2018-02-16 NOTE — PROGRESS NOTES
Occupational Therapy: Cleared by nurse with a BP parameter of SBP<180. Patient moved to sitting EOB with SBA. BP: 189/89. Sat for approximately 4 min: BP: 185/108. Patient moved to supine. Nurse arrived and informed of BP. Patient rested for several minutes. BP in supine: 170/91. HR in 70s and 80s. Nurse reports she will give patient extra BP medication. Will follow and see as able and appropriate.   Joe Rankin, DEAN/PHONG  10:52 - 11:10

## 2018-02-16 NOTE — PROGRESS NOTES
Problem: Falls - Risk of  Goal: *Absence of Falls  Document Avinash Fall Risk and appropriate interventions in the flowsheet.    Outcome: Progressing Towards Goal  Fall Risk Interventions:  Mobility Interventions: Communicate number of staff needed for ambulation/transfer, Patient to call before getting OOB, PT Consult for mobility concerns, PT Consult for assist device competence, Strengthening exercises (ROM-active/passive)         Medication Interventions: Evaluate medications/consider consulting pharmacy, Patient to call before getting OOB, Teach patient to arise slowly

## 2018-02-16 NOTE — PROGRESS NOTES
Problem: Self Care Deficits Care Plan (Adult)  Goal: *Acute Goals and Plan of Care (Insert Text)  Occupational Therapy Goals  Initiated 2/14/2018   1. Patient will perform lower body dressing with independence within 7 day(s). 2.  Patient will perform bathing with modified independence within 7 day(s). 3.  Patient will perform upper body ADLs standing at sink for 5 minutes without signs of fatigue or LOB within 7 day(s). 4.  Patient will perform toilet transfers with independence within 7 day(s). 5.  Patient will perform all aspects of toileting with independence within 7 day(s). 6.  Patient will participate in upper extremity therapeutic exercise/activities with independence for 10 minutes within 7 day(s). 7.  Patient will utilize energy conservation techniques during functional activities with verbal cues within 7 day(s). Occupational Therapy TREATMENT  Patient: Francisco Ohara (50 y.o. male)  Date: 2/16/2018  Diagnosis: Left-sided weakness  Hypertensive urgency  Acute CVA (cerebrovascular accident) ([facility] [address]  Hypertensive urgency  Abnormal EKG <principal problem not specified>       Precautions:    Chart, occupational therapy assessment, plan of care, and goals were reviewed. ASSESSMENT:  Based on the objective data below, the patient participated in active and simulated self care and transfer to chair with supervision to mod assist. Performance initially impacted by elevated BP at initial meeting. Nurse reports giving BP medication. On second attempt, BP stable and patient participating well. Performance impacted by impaired left LE strength, decreased left UE strength, impaired standing balance, and impaired mobility with AD. 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 He is able to tolerate 3 hours of therapy a day. Recommend Inpatient rehab.   Progression toward goals:  [x]          Improving appropriately and progressing toward goals  [] Improving slowly and progressing toward goals  []          Not making progress toward goals and plan of care will be adjusted     PLAN:  Patient continues to benefit from skilled intervention to address the above impairments. Continue treatment per established plan of care. Discharge Recommendations:  Inpatient Rehab  Further Equipment Recommendations for Discharge:  none     SUBJECTIVE:   Patient stated I'm fine.     OBJECTIVE DATA SUMMARY:   Cognitive/Behavioral Status:  Neurologic State: Alert  Orientation Level: Oriented X4  Cognition: Appropriate for age attention/concentration; Follows commands  Perception: Appears intact  Perseveration: No perseveration noted  Safety/Judgement: Awareness of environment  Functional Mobility and Transfers for ADLs:   Bed Mobility:     Supine to Sit: Supervision;Assist x1           Transfers:  Sit to Stand: Minimum assistance;Assist x1  Functional Transfers  Toilet Transfer : Moderate assistance;Assist x1 (inferred from transfer to chair)  Adaptive Equipment: Bedside commode;Walker (comment)    Balance:  Sitting: Intact  Sitting - Static: Good (unsupported)  Sitting - Dynamic: Good (unsupported)  Standing: Impaired  Standing - Static: Fair  Standing - Dynamic : Fair  ADL Intervention:  Feeding  Feeding Assistance: Independent         Upper Body Bathing  Bathing Assistance: Supervision/set-up; Independent (in simulation)  Position Performed: Seated edge of bed    Lower Body Bathing  Bathing Assistance:  (in simulation)  Perineal  : Moderate assistance  Position Performed: Standing (with walker)  Lower Body : Supervision/set-up  Position Performed: Seated edge of bed         Lower Body Dressing Assistance  Pants With Elastic Waist: Moderate assistance; Independent (in simulation)  Socks: Supervision/set-up  Leg Crossed Method Used:  (partially)  Position Performed: Seated edge of bed;Standing  Adaptive Equipment Used: Walker         Cognitive Retraining  Safety/Judgement: Awareness of environment    Therapeutic Exercises:   Instructed in AROM in left shoulder flex/ext for 3 sets of 10 reps a day. Activity Tolerance:    Fair  Please refer to the flowsheet for vital signs taken during this treatment.   After treatment:   [x]  Patient left in no apparent distress sitting up in chair  []  Patient left in no apparent distress in bed  [x]  Call bell left within reach  [x]  Nursing notified  []  Caregiver present  []  Bed alarm activated    COMMUNICATION/COLLABORATION:   The patients plan of care was discussed with: Registered Nurse    DEAN Worrell  Time Calculation: 20 mins

## 2018-02-16 NOTE — PROGRESS NOTES
Hospitalist Progress Note          Iris Wing MD  Please call  and page for questions. Call physician on-call through the  7pm-7am    Daily Progress Note: 2/16/2018    Primary care provider:None    Date of admission: 2/13/2018 12:39 PM    Admission summery and hospital course:  Neli Lee is a 50 y. o. male who presents with PMHx of HTN, noncompliance to medication, unable to say when he last took BP meds, admitted for left sided weakness. Pt states that he noticed it last night involving arm and leg. When asked why he did not seek medical attention last night, he said \"it could wait until morning\". Subjective:   Patient said she/he is feeling better today. Assessment/Plan:   Hypertensive Urgency  -Continue lisinopril and Coreg and hydralazin as PRN. -I have increased lisinopril as BP is still elevated. Left Hemiparesis likely due to Acute CVA  - ASA, add statin  - Continue PT/OT/ST  - Neurology and cardiology input appreciated. - CTA head/neck- Multifocal areas of presumably remote ischemia in the basal ganglia. -MRI showed acute nonhemorrhagic right deep cerebral white matter infarct/ischemia and extensive chronic ischemic findings. - permissive hypertension for 24 hours        Abnormal EKG/Elevated troponin:   - CT chest/abd/pel without acute finding. Appreciated cardiology input.   - ECHO with normal EF.       See orders for other plans. VTE prophylaxis: SCD  Code status: Full  Discussed plan of care with Patient/Family and Nurse. Pre-admission lived at home. Discharge planning: Continue PT/OT, he needs rehab.  are working for the safe discharge.         Review of Systems:     Review of Systems:  Symptom  Y/N  Comments   Symptom  Y/N  Comments    Fever/Chills  n    Chest Pain  n    Poor Appetite  n    Edema  n     Cough  n   Abdominal Pain  n     Sputum  n   Joint Pain  n    SOB/STREET  n   Pruritis/Rash Nausea/vomit     Tolerating PT/OT      Diarrhea     Tolerating Diet      Constipation     Other      Could not obtain due to:         Objective:   Physical Exam:     Visit Vitals    /76    Pulse 79    Temp 98.2 °F (36.8 °C)    Resp 20    Ht 5' 11\" (1.803 m)    Wt 95.5 kg (210 lb 8 oz)    SpO2 96%    BMI 29.36 kg/m2      O2 Device: Room air    Temp (24hrs), Av.2 °F (36.8 °C), Min:97.4 °F (36.3 °C), Max:98.5 °F (36.9 °C)    701 - 1900  In: -   Out: 425 [Urine:425]   1901 -  0700  In: 480 [P.O.:480]  Out: 2100 [Urine:2100]    General:  Alert, cooperative, no distress, appears stated age. Lungs:   Clear to auscultation bilaterally. Chest wall:  No tenderness or deformity. Heart:  Regular rate and rhythm, S1, S2 normal, no murmur. Abdomen:   Soft, non-tender. Bowel sounds normal.    Extremities: Extremities normal, atraumatic, no cyanosis or edema. Pulses: 2+ and symmetric all extremities. Skin: Skin color, texture, turgor normal. No rashes or lesions   Neurologic: CNII-XII intact. LLE strength is 4/5      Data Review:       Recent Days:  No results for input(s): WBC, HGB, HCT, PLT, HGBEXT, HCTEXT, PLTEXT in the last 72 hours. Recent Labs      18   0335  02/15/18   0315  18   0307   NA  138  138  138   K  3.9  3.9  3.5   CL  104  104  104   CO2  25  27  24   GLU  117*  115*  112*   BUN  16  12  9   CREA  1.00  1.04  0.96   CA  8.9  8.6  8.5   MG  1.9  2.0  1.9     No results for input(s): PH, PCO2, PO2, HCO3, FIO2 in the last 72 hours.     24 Hour Results:  Recent Results (from the past 24 hour(s))   METABOLIC PANEL, BASIC    Collection Time: 18  3:35 AM   Result Value Ref Range    Sodium 138 136 - 145 mmol/L    Potassium 3.9 3.5 - 5.1 mmol/L    Chloride 104 97 - 108 mmol/L    CO2 25 21 - 32 mmol/L    Anion gap 9 5 - 15 mmol/L    Glucose 117 (H) 65 - 100 mg/dL    BUN 16 6 - 20 MG/DL    Creatinine 1.00 0.70 - 1.30 MG/DL    BUN/Creatinine ratio 16 12 - 20      GFR est AA >60 >60 ml/min/1.73m2    GFR est non-AA >60 >60 ml/min/1.73m2    Calcium 8.9 8.5 - 10.1 MG/DL   MAGNESIUM    Collection Time: 02/16/18  3:35 AM   Result Value Ref Range    Magnesium 1.9 1.6 - 2.4 mg/dL       Problem List:  Problem List as of 2/16/2018  Never Reviewed          Codes Class Noted - Resolved    Left-sided weakness ICD-10-CM: R53.1  ICD-9-CM: 728.87  2/13/2018 - Present        Hypertensive urgency ICD-10-CM: I16.0  ICD-9-CM: 401.9  2/13/2018 - Present        Abnormal EKG ICD-10-CM: R94.31  ICD-9-CM: 794.31  2/13/2018 - Present        Acute CVA (cerebrovascular accident) [facility]) ICD-10-CM: I63.9  ICD-9-CM: 434.91  2/13/2018 - Present              Medications reviewed  Current Facility-Administered Medications   Medication Dose Route Frequency    hydrALAZINE (APRESOLINE) 20 mg/mL injection 10 mg  10 mg IntraVENous Q6H PRN    chlorthalidone (HYGROTEN) tablet 25 mg  25 mg Oral DAILY    carvedilol (COREG) tablet 12.5 mg  12.5 mg Oral BID    lisinopril (PRINIVIL, ZESTRIL) tablet 20 mg  20 mg Oral DAILY    atorvastatin (LIPITOR) tablet 20 mg  20 mg Oral QHS    acetaminophen (TYLENOL) tablet 650 mg  650 mg Oral Q4H PRN    Or    acetaminophen (TYLENOL) solution 650 mg  650 mg Per NG tube Q4H PRN    Or    acetaminophen (TYLENOL) suppository 650 mg  650 mg Rectal Q4H PRN    ondansetron (ZOFRAN) injection 4 mg  4 mg IntraVENous Q6H PRN    aspirin chewable tablet 81 mg  81 mg Oral DAILY    docusate sodium (COLACE) capsule 100 mg  100 mg Oral BID    bisacodyl (DULCOLAX) tablet 5 mg  5 mg Oral DAILY PRN    famotidine (PEPCID) tablet 20 mg  20 mg Oral Q12H    niCARdipine (CARDENE) 25 mg in 0.9% sodium chloride 250 mL infusion  5 mg/hr IntraVENous CONTINUOUS       Care Plan discussed with: Patient/Family, Nurse and     Total time spent with patient: 40 minutes.     Jose Antonio Cain MD

## 2018-02-17 PROCEDURE — 74011250637 HC RX REV CODE- 250/637: Performed by: NURSE PRACTITIONER

## 2018-02-17 PROCEDURE — 74011250637 HC RX REV CODE- 250/637: Performed by: HOSPITALIST

## 2018-02-17 PROCEDURE — 74011250637 HC RX REV CODE- 250/637: Performed by: FAMILY MEDICINE

## 2018-02-17 PROCEDURE — 65660000000 HC RM CCU STEPDOWN

## 2018-02-17 RX ORDER — LISINOPRIL 10 MG/1
10 TABLET ORAL
Status: COMPLETED | OUTPATIENT
Start: 2018-02-17 | End: 2018-02-17

## 2018-02-17 RX ORDER — LISINOPRIL 20 MG/1
40 TABLET ORAL DAILY
Status: DISCONTINUED | OUTPATIENT
Start: 2018-02-18 | End: 2018-02-18

## 2018-02-17 RX ADMIN — ATORVASTATIN CALCIUM 20 MG: 40 TABLET, FILM COATED ORAL at 22:13

## 2018-02-17 RX ADMIN — ASPIRIN 81 MG 81 MG: 81 TABLET ORAL at 09:51

## 2018-02-17 RX ADMIN — FAMOTIDINE 20 MG: 20 TABLET, FILM COATED ORAL at 09:51

## 2018-02-17 RX ADMIN — CARVEDILOL 12.5 MG: 12.5 TABLET, FILM COATED ORAL at 09:52

## 2018-02-17 RX ADMIN — FAMOTIDINE 20 MG: 20 TABLET, FILM COATED ORAL at 22:13

## 2018-02-17 RX ADMIN — LISINOPRIL 10 MG: 10 TABLET ORAL at 15:53

## 2018-02-17 RX ADMIN — CARVEDILOL 12.5 MG: 12.5 TABLET, FILM COATED ORAL at 17:10

## 2018-02-17 RX ADMIN — CHLORTHALIDONE 25 MG: 25 TABLET ORAL at 09:52

## 2018-02-17 RX ADMIN — LISINOPRIL 30 MG: 20 TABLET ORAL at 09:51

## 2018-02-17 NOTE — PROGRESS NOTES
Hospitalist Progress Note          Tessie Lomas MD  Please call  and page for questions. Call physician on-call through the  7pm-7am    Daily Progress Note: 2/17/2018    Primary care provider:None    Date of admission: 2/13/2018 12:39 PM    Admission summery and hospital course:  Aracelis Lee is a 50 y. o. male who presents with PMHx of HTN, noncompliance to medication, unable to say when he last took BP meds, admitted for left sided weakness. Pt states that he noticed it last night involving arm and leg. When asked why he did not seek medical attention last night, he said \"it could wait until morning\".        Subjective:   Patient said she/he is feeling better today. Assessment/Plan:    Hypertensive Urgency  -Continue lisinopril and Coreg and hydralazin as PRN. -I have increased lisinopril as BP is still elevated.      Left Hemiparesis likely due to Acute CVA  - ASA, add statin  - Continue PT/OT/ST  - Neurology and cardiology input appreciated.    - CTA head/neck- Multifocal areas of presumably remote ischemia in the basal ganglia. -MRI showed acute nonhemorrhagic right deep cerebral white matter infarct/ischemia and extensive chronic ischemic findings.   - permissive hypertension for 24 hours       Abnormal EKG/Elevated troponin:   - CT chest/abd/pel without acute finding. Appreciated cardiology input.   - ECHO with normal EF.        See orders for other plans. VTE prophylaxis: SCD  Code status: Full  Discussed plan of care with Patient/Family and Nurse. Pre-admission lived at home. Discharge planning: Continue PT/OT, he needs rehab.   are working for the safe discharge       Review of Systems:     Review of Systems:  Symptom  Y/N  Comments   Symptom  Y/N  Comments    Fever/Chills  n    Chest Pain  n    Poor Appetite  n    Edema   n    Cough  n   Abdominal Pain   n    Sputum  n   Joint Pain      SOB/STREET  n   Pruritis/Rash Nausea/vomit  n   Tolerating PT/OT      Diarrhea     Tolerating Diet      Constipation     Other      Could not obtain due to:         Objective:   Physical Exam:     Visit Vitals    /89    Pulse 77    Temp 98.2 °F (36.8 °C)    Resp 17    Ht 5' 11\" (1.803 m)    Wt 94.3 kg (207 lb 12.8 oz)    SpO2 96%    BMI 28.98 kg/m2      O2 Device: Room air    Temp (24hrs), Av.5 °F (36.9 °C), Min:98.2 °F (36.8 °C), Max:98.8 °F (37.1 °C)        02/15 1901 -  0700  In: -   Out: 725 [Urine:725]    General:  Alert, cooperative, no distress, appears stated age. Lungs:   Clear to auscultation bilaterally. Chest wall:  No tenderness or deformity. Heart:  Regular rate and rhythm, S1, S2 normal, no murmur. Abdomen:   Soft, non-tender. Bowel sounds normal.    Extremities: Extremities normal, atraumatic, no cyanosis or edema. Pulses: 2+ and symmetric all extremities. Skin: Skin color, texture, turgor normal. No rashes or lesions   Neurologic: CNII-XII intact. LLE strength is 4/5      Data Review:       Recent Days:  No results for input(s): WBC, HGB, HCT, PLT, HGBEXT, HCTEXT, PLTEXT in the last 72 hours. Recent Labs      18   0335  02/15/18   0315   NA  138  138   K  3.9  3.9   CL  104  104   CO2  25  27   GLU  117*  115*   BUN  16  12   CREA  1.00  1.04   CA  8.9  8.6   MG  1.9  2.0     No results for input(s): PH, PCO2, PO2, HCO3, FIO2 in the last 72 hours. 24 Hour Results:  No results found for this or any previous visit (from the past 24 hour(s)).     Problem List:  Problem List as of 2018  Never Reviewed          Codes Class Noted - Resolved    Left-sided weakness ICD-10-CM: R53.1  ICD-9-CM: 728.87  2018 - Present        Hypertensive urgency ICD-10-CM: I16.0  ICD-9-CM: 401.9  2018 - Present        Abnormal EKG ICD-10-CM: R94.31  ICD-9-CM: 794.31  2018 - Present        Acute CVA (cerebrovascular accident) [facility]) ICD-10-CM: I63.9  ICD-9-CM: 434.91  2018 - Present Medications reviewed  Current Facility-Administered Medications   Medication Dose Route Frequency    lisinopril (PRINIVIL, ZESTRIL) tablet 30 mg  30 mg Oral DAILY    hydrALAZINE (APRESOLINE) 20 mg/mL injection 10 mg  10 mg IntraVENous Q6H PRN    chlorthalidone (HYGROTEN) tablet 25 mg  25 mg Oral DAILY    carvedilol (COREG) tablet 12.5 mg  12.5 mg Oral BID    atorvastatin (LIPITOR) tablet 20 mg  20 mg Oral QHS    acetaminophen (TYLENOL) tablet 650 mg  650 mg Oral Q4H PRN    Or    acetaminophen (TYLENOL) solution 650 mg  650 mg Per NG tube Q4H PRN    Or    acetaminophen (TYLENOL) suppository 650 mg  650 mg Rectal Q4H PRN    ondansetron (ZOFRAN) injection 4 mg  4 mg IntraVENous Q6H PRN    aspirin chewable tablet 81 mg  81 mg Oral DAILY    docusate sodium (COLACE) capsule 100 mg  100 mg Oral BID    bisacodyl (DULCOLAX) tablet 5 mg  5 mg Oral DAILY PRN    famotidine (PEPCID) tablet 20 mg  20 mg Oral Q12H    niCARdipine (CARDENE) 25 mg in 0.9% sodium chloride 250 mL infusion  5 mg/hr IntraVENous CONTINUOUS       Care Plan discussed with: Patient/Family and Nurse    Total time spent with patient: 30 minutes.     Karis Nissen, MD

## 2018-02-17 NOTE — PROGRESS NOTES
Bedside and Verbal shift change report given to Vanesa Watkins (oncoming nurse) by Drea Andre (offgoing nurse). Report included the following information SBAR, Kardex, ED Summary, OR Summary, Procedure Summary, Intake/Output, MAR, Accordion, Recent Results, Med Rec Status, Cardiac Rhythm NSR and Alarm Parameters . 2000 report received     No events    Bedside and Verbal shift change report given to [address] [place] [address] (oncoming nurse) by Vanesa Watkins (offgoing nurse). Report included the following information SBAR, Kardex, ED Summary, OR Summary, Procedure Summary, Intake/Output, MAR, Accordion, Recent Results, Med Rec Status, Cardiac Rhythm NSR and Alarm Parameters .

## 2018-02-17 NOTE — PROGRESS NOTES
Bedside shift change report given to Freddy Doe (oncoming nurse) by Akilah Bernabe (offgoing nurse). Report included the following information SBAR, Kardex, Procedure Summary, Intake/Output, MAR, Accordion, Recent Results and Med Rec Status.

## 2018-02-17 NOTE — PROGRESS NOTES
Bedside and Verbal shift change report given to Lalita RUIZ (oncoming nurse) by Betty Meyer (offgoing nurse). Report included the following information SBAR, Kardex and Recent Results.

## 2018-02-18 LAB
ANION GAP SERPL CALC-SCNC: 8 MMOL/L (ref 5–15)
BUN SERPL-MCNC: 30 MG/DL (ref 6–20)
BUN/CREAT SERPL: 22 (ref 12–20)
CALCIUM SERPL-MCNC: 9.2 MG/DL (ref 8.5–10.1)
CHLORIDE SERPL-SCNC: 103 MMOL/L (ref 97–108)
CO2 SERPL-SCNC: 26 MMOL/L (ref 21–32)
CREAT SERPL-MCNC: 1.39 MG/DL (ref 0.7–1.3)
GLUCOSE SERPL-MCNC: 111 MG/DL (ref 65–100)
MAGNESIUM SERPL-MCNC: 2.2 MG/DL (ref 1.6–2.4)
POTASSIUM SERPL-SCNC: 4 MMOL/L (ref 3.5–5.1)
SODIUM SERPL-SCNC: 137 MMOL/L (ref 136–145)

## 2018-02-18 PROCEDURE — 36415 COLL VENOUS BLD VENIPUNCTURE: CPT | Performed by: NURSE PRACTITIONER

## 2018-02-18 PROCEDURE — 83735 ASSAY OF MAGNESIUM: CPT | Performed by: NURSE PRACTITIONER

## 2018-02-18 PROCEDURE — 74011250637 HC RX REV CODE- 250/637: Performed by: FAMILY MEDICINE

## 2018-02-18 PROCEDURE — 80048 BASIC METABOLIC PNL TOTAL CA: CPT | Performed by: NURSE PRACTITIONER

## 2018-02-18 PROCEDURE — 74011250637 HC RX REV CODE- 250/637: Performed by: NURSE PRACTITIONER

## 2018-02-18 PROCEDURE — 74011250637 HC RX REV CODE- 250/637: Performed by: HOSPITALIST

## 2018-02-18 PROCEDURE — 65660000000 HC RM CCU STEPDOWN

## 2018-02-18 RX ADMIN — CHLORTHALIDONE 25 MG: 25 TABLET ORAL at 09:13

## 2018-02-18 RX ADMIN — FAMOTIDINE 20 MG: 20 TABLET, FILM COATED ORAL at 23:19

## 2018-02-18 RX ADMIN — LISINOPRIL 40 MG: 20 TABLET ORAL at 09:14

## 2018-02-18 RX ADMIN — ASPIRIN 81 MG 81 MG: 81 TABLET ORAL at 09:12

## 2018-02-18 RX ADMIN — CARVEDILOL 12.5 MG: 12.5 TABLET, FILM COATED ORAL at 09:13

## 2018-02-18 RX ADMIN — CARVEDILOL 12.5 MG: 12.5 TABLET, FILM COATED ORAL at 17:50

## 2018-02-18 RX ADMIN — DOCUSATE SODIUM 100 MG: 100 CAPSULE, LIQUID FILLED ORAL at 17:50

## 2018-02-18 RX ADMIN — DOCUSATE SODIUM 100 MG: 100 CAPSULE, LIQUID FILLED ORAL at 09:14

## 2018-02-18 RX ADMIN — ATORVASTATIN CALCIUM 20 MG: 40 TABLET, FILM COATED ORAL at 23:17

## 2018-02-18 RX ADMIN — FAMOTIDINE 20 MG: 20 TABLET, FILM COATED ORAL at 09:14

## 2018-02-18 NOTE — PROGRESS NOTES
Hospitalist Progress Note          Jazmine Thornton MD  Please call  and page for questions. Call physician on-call through the  7pm-7am    Daily Progress Note: 2/18/2018    Primary care provider:None    Date of admission: 2/13/2018 12:39 PM    Admission summery and hospital course:  Pamela Lee is a 50 y. o. male who presents with PMHx of HTN, noncompliance to medication, unable to say when he last took BP meds, admitted for left sided weakness. Pt states that he noticed it last night involving arm and leg. When asked why he did not seek medical attention last night, he said \"it could wait until morning\".      Subjective:   Patient said he is doing fine. Assessment/Plan:   Hypertensive Urgency  -BP has been stabilized now. Patient received lisinopril 40 mg this AM which I will hold for now. -Recheck renal status at AM. Consider other agent optimizing Coreg and or adding other agent if BP is not stabilized and renal status does not improve. Continue  hydralazin as PRN. Acute renal insufficiency:   Probably multifactorial including ACEi and inadequate PO intake. Hold lisinopril and monitor.       Left Hemiparesis likely due to Acute CVA  - ASA, add statin  - Continue PT/OT/ST  - Neurology and cardiology input appreciated.    - CTA head/neck- Multifocal areas of presumably remote ischemia in the basal ganglia. -MRI showed acute nonhemorrhagic right deep cerebral white matter infarct/ischemia and extensive chronic ischemic findings.   - permissive hypertension for 24 hours       Abnormal EKG/Elevated troponin:   - CT chest/abd/pel without acute finding. Appreciated cardiology input.   - ECHO with normal EF.        See orders for other plans. VTE prophylaxis: SCD  Code status: Full  Discussed plan of care with Patient/Family and Nurse. Pre-admission lived at home. Discharge planning: Continue PT/OT, he needs rehab.   are working for the safe discharge. Review of Systems:     Review of Systems:  Symptom  Y/N  Comments   Symptom  Y/N  Comments    Fever/Chills   n   Chest Pain  n    Poor Appetite   n   Edema  n     Cough  n   Abdominal Pain   n    Sputum  n   Joint Pain      SOB/STREET  n   Pruritis/Rash      Nausea/vomit  n   Tolerating PT/OT      Diarrhea  n   Tolerating Diet      Constipation  n   Other      Could not obtain due to:         Objective:   Physical Exam:     Visit Vitals    /78 (BP 1 Location: Right arm, BP Patient Position: At rest)    Pulse 69    Temp 98.6 °F (37 °C)    Resp 17    Ht 5' 11\" (1.803 m)    Wt 93.5 kg (206 lb 3.2 oz)    SpO2 96%    BMI 28.76 kg/m2      O2 Device: Room air    Temp (24hrs), Av.1 °F (36.7 °C), Min:97.4 °F (36.3 °C), Max:98.8 °F (37.1 °C)     07 - 1900  In: -   Out: 500 [Urine:500]   1901 -  07  In: -   Out: 550 [Urine:550]     General:  Alert, cooperative, no distress, appears stated age. He is comfortably lying in the bed. Lungs:   Clear to auscultation bilaterally. Chest wall:  No tenderness or deformity. Heart:  Regular rate and rhythm, S1, S2 normal, no murmur. Abdomen:   Soft, non-tender. Bowel sounds normal.    Extremities: Extremities normal, atraumatic, no cyanosis or edema. Pulses: 2+ and symmetric all extremities. Skin: Skin color, texture, turgor normal. No rashes or lesions   Neurologic: CNII-XII intact. LLE strength is 4/5            Data Review:       Recent Days:  No results for input(s): WBC, HGB, HCT, PLT, HGBEXT, HCTEXT, PLTEXT in the last 72 hours. Recent Labs      18   0301  18   0335   NA  137  138   K  4.0  3.9   CL  103  104   CO2  26  25   GLU  111*  117*   BUN  30*  16   CREA  1.39*  1.00   CA  9.2  8.9   MG  2.2  1.9     No results for input(s): PH, PCO2, PO2, HCO3, FIO2 in the last 72 hours.     24 Hour Results:  Recent Results (from the past 24 hour(s))   METABOLIC PANEL, BASIC    Collection Time: 18 3:01 AM   Result Value Ref Range    Sodium 137 136 - 145 mmol/L    Potassium 4.0 3.5 - 5.1 mmol/L    Chloride 103 97 - 108 mmol/L    CO2 26 21 - 32 mmol/L    Anion gap 8 5 - 15 mmol/L    Glucose 111 (H) 65 - 100 mg/dL    BUN 30 (H) 6 - 20 MG/DL    Creatinine 1.39 (H) 0.70 - 1.30 MG/DL    BUN/Creatinine ratio 22 (H) 12 - 20      GFR est AA >60 >60 ml/min/1.73m2    GFR est non-AA 55 (L) >60 ml/min/1.73m2    Calcium 9.2 8.5 - 10.1 MG/DL   MAGNESIUM    Collection Time: 02/18/18  3:01 AM   Result Value Ref Range    Magnesium 2.2 1.6 - 2.4 mg/dL       Problem List:  Problem List as of 2/18/2018  Never Reviewed          Codes Class Noted - Resolved    Left-sided weakness ICD-10-CM: R53.1  ICD-9-CM: 728.87  2/13/2018 - Present        Hypertensive urgency ICD-10-CM: I16.0  ICD-9-CM: 401.9  2/13/2018 - Present        Abnormal EKG ICD-10-CM: R94.31  ICD-9-CM: 794.31  2/13/2018 - Present        Acute CVA (cerebrovascular accident) [facility]) ICD-10-CM: I63.9  ICD-9-CM: 434.91  2/13/2018 - Present              Medications reviewed  Current Facility-Administered Medications   Medication Dose Route Frequency    hydrALAZINE (APRESOLINE) 20 mg/mL injection 10 mg  10 mg IntraVENous Q6H PRN    chlorthalidone (HYGROTEN) tablet 25 mg  25 mg Oral DAILY    carvedilol (COREG) tablet 12.5 mg  12.5 mg Oral BID    atorvastatin (LIPITOR) tablet 20 mg  20 mg Oral QHS    acetaminophen (TYLENOL) tablet 650 mg  650 mg Oral Q4H PRN    Or    acetaminophen (TYLENOL) solution 650 mg  650 mg Per NG tube Q4H PRN    Or    acetaminophen (TYLENOL) suppository 650 mg  650 mg Rectal Q4H PRN    ondansetron (ZOFRAN) injection 4 mg  4 mg IntraVENous Q6H PRN    aspirin chewable tablet 81 mg  81 mg Oral DAILY    docusate sodium (COLACE) capsule 100 mg  100 mg Oral BID    bisacodyl (DULCOLAX) tablet 5 mg  5 mg Oral DAILY PRN    famotidine (PEPCID) tablet 20 mg  20 mg Oral Q12H    niCARdipine (CARDENE) 25 mg in 0.9% sodium chloride 250 mL infusion  5 mg/hr IntraVENous CONTINUOUS       Care Plan discussed with: Patient/Family and Nurse    Total time spent with patient: 30 minutes.     Jammie Ching MD

## 2018-02-18 NOTE — PROGRESS NOTES
Bedside shift change report given to Meng Haile (oncoming nurse) by Veronica Cohen (offgoing nurse). Report included the following information SBAR, Intake/Output, MAR, Accordion, Recent Results and Med Rec Status.

## 2018-02-18 NOTE — PROGRESS NOTES
Bedside shift change report given to Marilu Castro (oncoming nurse) by Carter Mccartney (offgoing nurse). Report included the following information SBAR, Kardex, Intake/Output, MAR, Accordion, Recent Results and Med Rec Status.

## 2018-02-19 LAB
ALBUMIN SERPL-MCNC: 3.5 G/DL (ref 3.5–5)
ALBUMIN/GLOB SERPL: 0.8 {RATIO} (ref 1.1–2.2)
ALP SERPL-CCNC: 110 U/L (ref 45–117)
ALT SERPL-CCNC: 30 U/L (ref 12–78)
ANION GAP SERPL CALC-SCNC: 9 MMOL/L (ref 5–15)
AST SERPL-CCNC: 17 U/L (ref 15–37)
BASOPHILS # BLD: 0.1 K/UL (ref 0–0.1)
BASOPHILS NFR BLD: 1 % (ref 0–1)
BILIRUB SERPL-MCNC: 0.5 MG/DL (ref 0.2–1)
BUN SERPL-MCNC: 38 MG/DL (ref 6–20)
BUN/CREAT SERPL: 24 (ref 12–20)
CALCIUM SERPL-MCNC: 9.1 MG/DL (ref 8.5–10.1)
CHLORIDE SERPL-SCNC: 104 MMOL/L (ref 97–108)
CO2 SERPL-SCNC: 24 MMOL/L (ref 21–32)
CREAT SERPL-MCNC: 1.61 MG/DL (ref 0.7–1.3)
DIFFERENTIAL METHOD BLD: ABNORMAL
EOSINOPHIL # BLD: 0.2 K/UL (ref 0–0.4)
EOSINOPHIL NFR BLD: 2 % (ref 0–7)
ERYTHROCYTE [DISTWIDTH] IN BLOOD BY AUTOMATED COUNT: 13.6 % (ref 11.5–14.5)
GLOBULIN SER CALC-MCNC: 4.2 G/DL (ref 2–4)
GLUCOSE SERPL-MCNC: 120 MG/DL (ref 65–100)
HCT VFR BLD AUTO: 47.6 % (ref 36.6–50.3)
HGB BLD-MCNC: 15.4 G/DL (ref 12.1–17)
IMM GRANULOCYTES # BLD: 0.1 K/UL (ref 0–0.04)
IMM GRANULOCYTES NFR BLD AUTO: 1 % (ref 0–0.5)
LYMPHOCYTES # BLD: 3.2 K/UL (ref 0.8–3.5)
LYMPHOCYTES NFR BLD: 41 % (ref 12–49)
MCH RBC QN AUTO: 26.1 PG (ref 26–34)
MCHC RBC AUTO-ENTMCNC: 32.4 G/DL (ref 30–36.5)
MCV RBC AUTO: 80.7 FL (ref 80–99)
MONOCYTES # BLD: 0.9 K/UL (ref 0–1)
MONOCYTES NFR BLD: 11 % (ref 5–13)
NEUTS SEG # BLD: 3.5 K/UL (ref 1.8–8)
NEUTS SEG NFR BLD: 44 % (ref 32–75)
NRBC # BLD: 0 K/UL (ref 0–0.01)
NRBC BLD-RTO: 0 PER 100 WBC
PLATELET # BLD AUTO: 287 K/UL (ref 150–400)
PMV BLD AUTO: 10.9 FL (ref 8.9–12.9)
POTASSIUM SERPL-SCNC: 4.2 MMOL/L (ref 3.5–5.1)
PROT SERPL-MCNC: 7.7 G/DL (ref 6.4–8.2)
RBC # BLD AUTO: 5.9 M/UL (ref 4.1–5.7)
SODIUM SERPL-SCNC: 137 MMOL/L (ref 136–145)
WBC # BLD AUTO: 7.9 K/UL (ref 4.1–11.1)

## 2018-02-19 PROCEDURE — 80053 COMPREHEN METABOLIC PANEL: CPT | Performed by: FAMILY MEDICINE

## 2018-02-19 PROCEDURE — 65660000000 HC RM CCU STEPDOWN

## 2018-02-19 PROCEDURE — 36415 COLL VENOUS BLD VENIPUNCTURE: CPT | Performed by: FAMILY MEDICINE

## 2018-02-19 PROCEDURE — 74011250636 HC RX REV CODE- 250/636: Performed by: NURSE PRACTITIONER

## 2018-02-19 PROCEDURE — 85025 COMPLETE CBC W/AUTO DIFF WBC: CPT | Performed by: FAMILY MEDICINE

## 2018-02-19 PROCEDURE — 74011250637 HC RX REV CODE- 250/637: Performed by: HOSPITALIST

## 2018-02-19 PROCEDURE — 74011250637 HC RX REV CODE- 250/637: Performed by: NURSE PRACTITIONER

## 2018-02-19 PROCEDURE — 97116 GAIT TRAINING THERAPY: CPT

## 2018-02-19 RX ORDER — AMLODIPINE BESYLATE 5 MG/1
5 TABLET ORAL DAILY
Status: DISCONTINUED | OUTPATIENT
Start: 2018-02-20 | End: 2018-02-20

## 2018-02-19 RX ORDER — SODIUM CHLORIDE 9 MG/ML
75 INJECTION, SOLUTION INTRAVENOUS CONTINUOUS
Status: DISPENSED | OUTPATIENT
Start: 2018-02-19 | End: 2018-02-20

## 2018-02-19 RX ADMIN — SODIUM CHLORIDE 75 ML/HR: 900 INJECTION, SOLUTION INTRAVENOUS at 22:00

## 2018-02-19 RX ADMIN — FAMOTIDINE 20 MG: 20 TABLET, FILM COATED ORAL at 09:07

## 2018-02-19 RX ADMIN — CHLORTHALIDONE 25 MG: 25 TABLET ORAL at 09:07

## 2018-02-19 RX ADMIN — CARVEDILOL 12.5 MG: 12.5 TABLET, FILM COATED ORAL at 09:07

## 2018-02-19 RX ADMIN — DOCUSATE SODIUM 100 MG: 100 CAPSULE, LIQUID FILLED ORAL at 18:27

## 2018-02-19 RX ADMIN — CARVEDILOL 12.5 MG: 12.5 TABLET, FILM COATED ORAL at 18:27

## 2018-02-19 RX ADMIN — FAMOTIDINE 20 MG: 20 TABLET, FILM COATED ORAL at 21:08

## 2018-02-19 RX ADMIN — ASPIRIN 81 MG 81 MG: 81 TABLET ORAL at 09:07

## 2018-02-19 RX ADMIN — DOCUSATE SODIUM 100 MG: 100 CAPSULE, LIQUID FILLED ORAL at 09:07

## 2018-02-19 RX ADMIN — ATORVASTATIN CALCIUM 20 MG: 40 TABLET, FILM COATED ORAL at 22:25

## 2018-02-19 NOTE — PROGRESS NOTES
Problem: Mobility Impaired (Adult and Pediatric)  Goal: *Acute Goals and Plan of Care (Insert Text)  Physical Therapy Goals  Initiated 2/14/2018  1. Patient will move from supine to sit and sit to supine  in bed with modified independence within 7 day(s). 2.  Patient will transfer from bed to chair and chair to bed with supervision/set-up using the least restrictive device within 7 day(s). 3.  Patient will perform sit to stand with supervision/set-up within 7 day(s). 4.  Patient will ambulate with minimal assistance/contact guard assist for 150 feet with the least restrictive device within 7 day(s). 5.  Patient will ascend/descend 6 stairs with 1 handrail(s) with minimal assistance/contact guard assist within 7 day(s). 6.  Patient will improve Mendiola Balance score by 7 points within 7 days. physical Therapy TREATMENT  Patient: Irina Campbell (50 y.o. male)  Date: 2/19/2018  Diagnosis: Left-sided weakness  Hypertensive urgency  Acute CVA (cerebrovascular accident) ([facility] [address]  Hypertensive urgency  Abnormal EKG <principal problem not specified>       Precautions:    Chart, physical therapy assessment, plan of care and goals were reviewed. ASSESSMENT:  Cleared for mobility progression by RN, received supine in bed. BP assessed and 130s/80s. Pt motivated and agreeable to participation in tx session. Performed supine there ex for functional strengthening to include: bridges, SLR, manually resisted leg press, and manually resisted hip abd x10 reps each. He was able to mobilize to EOB with SBA and increased time with noted compensatory strategy use to assist L LE. Completed repeated sit<>stands from EOB to RW with min/mod A x2, emphasis on even weight distrubution through B LEs and avoiding L knee hyperextension once fully standing.  Gait training progressed x40 ft with Rw and assist x2 - manual facilitation of L LE advancement, appropriate placement, and avoiding knee hyperextension in stance phase, cues at trunk and glutes to maintain upright posture - note improvements with duration however does fatigue very quickly. He remains significantly limited by new L LE hemiparesis, ataxia, and poor gross motor coordination limiting his ability to complete mobility tasks as compared to independent baseline. Remained up in chair at end of session. Continue to strongly recommend discharge to acute IP rehab. Will follow. Progression toward goals:  []       Improving appropriately and progressing toward goals  [x]       Improving slowly and progressing toward goals  []       Not making progress toward goals and plan of care will be adjusted     PLAN:  Patient continues to benefit from skilled intervention to address the above impairments. Continue treatment per established plan of care. Discharge Recommendations:  Inpatient Rehab  Further Equipment Recommendations for Discharge:  TBD     SUBJECTIVE:   Patient stated I'm good, everything is good.     OBJECTIVE DATA SUMMARY:   Critical Behavior:  Neurologic State: Alert  Orientation Level: Oriented X4  Cognition: Appropriate for age attention/concentration, Follows commands  Safety/Judgement: Awareness of environment  Functional Mobility Training:  Bed Mobility:     Supine to Sit: Stand-by asssistance     Transfers:  Sit to Stand: Minimum assistance  Stand to Sit: Minimum assistance     Balance:  Sitting: Impaired; Without support  Sitting - Static: Good (unsupported)  Sitting - Dynamic: Good (unsupported)  Standing: Impaired; Without support  Standing - Static: Fair;Constant support  Standing - Dynamic : Poor  Ambulation/Gait Training:  Distance (ft): 40 Feet (ft)  Assistive Device: Gait belt;Walker, rolling  Ambulation - Level of Assistance: Assist x2; Moderate assistance  Gait Abnormalities: Ataxic;Decreased step clearance; Hemiplegic;Trunk sway increased   Stance: Left decreased  Speed/Estrellita: Pace decreased (<100 feet/min); Shuffled  Step Length: Left shortened;Right shortened  Swing Pattern: Left asymmetrical     Pain:  Pain Scale 1: Numeric (0 - 10)  Pain Intensity 1: 0     Activity Tolerance:   VSS, NAD    Please refer to the flowsheet for vital signs taken during this treatment. After treatment:   [x] Patient left in no apparent distress sitting up in chair  [] Patient left in no apparent distress in bed  [x] Call bell left within reach  [x] Nursing notified  [] Caregiver present  [] Bed alarm activated    COMMUNICATION/EDUCATION:   The patients plan of care was discussed with: Registered Nurse. Patient was educated regarding His deficit(s) of L LE hemiparesis as this relates to His diagnosis of CVA. He demonstrated Good understanding as evidenced by nodding. Patient and/or family was verbally educated on the BE FAST acronym for signs/symptoms of CVA and TIA. BE FAST was written on patient's communication board  for visual education and reinforcement. All questions answered with patient indicating good understanding. [x]  Fall prevention education was provided and the patient/caregiver indicated understanding. [x]  Patient/family have participated as able in goal setting and plan of care. [x]  Patient/family agree to work toward stated goals and plan of care. []  Patient understands intent and goals of therapy, but is neutral about his/her participation. []  Patient is unable to participate in goal setting and plan of care.     Thank you for this referral.  Heidy Barry, PT , DPT   Time Calculation: 21 mins

## 2018-02-19 NOTE — ROUTINE PROCESS
Bedside shift change report given to Indra Smith (oncoming nurse) by Francoise Brown RN (offgoing nurse). Report included the following information SBAR, Kardex, ED Summary, Procedure Summary, Intake/Output, MAR, Accordion, Recent Results and Cardiac Rhythm NSR.

## 2018-02-19 NOTE — PROGRESS NOTES
CARINA met with patient at bedside in order to inquire if his friend was able to bring in the necessary financial documentation to be considered for aleyda bed at inpatient rehab- patient stated his friend would bring his [phone] today. CM will call and speak with Carroll Matias with [facility]. CM will continue to follow. BEN Crain CM called and spoke with Robe Navarro, Liaison with [facility], and [facility] stated that they still need the financial document [phone], in addition, they are requesting a notarized letter of who will be \"financially responsible\" for the patient. CM will speak with patient at bedside if any family or friend is able to write this letter and have it notarized. CM will continue to follow. BEN Crain CM met with patient at bedside and he stated his friend is bringing the letter noting financial responsibility- CM explained it needs to be notarized, he verbalized understanding, and his friend is also bringing the [phone]. CARINA will continue to follow.  BEN Crain

## 2018-02-19 NOTE — PROGRESS NOTES
[facility] Progress Note    2/19/2018 7:30 AM   Admit Date: 2/13/2018  Admit Diagnosis: Left-sided weakness; Hypertensive urgency; Acute CVA (cerebro*    Assessment/Plan     We will sign off at this point. Please feel free to contact us for any future problems. He needs close follow up with [facility] at discharge. No need for cardiology follow up as PCp may manage his bp meds. Thank you for allowing us to participate in the care of Robert Wooten. 1.  Hypertensive emergency - /130 on admission, much better, at goal of 140s over 80s for now. 2.  Left sided weakness - seen by neurology, brain MRI shows acute infarction, getting PT/OT  -not a candidate for tPA on admission, working out rehab needs  3. Abnormal ECG - he denies any chest pain or dyspnea, appears to be NSR with LVH with strain   4. Elevated troponin - non-specific troponin elevation likely due to HTN, abnormal ECG with LVH with strain, TTE showed normal LVEF and LVH  5. Acute CVA - Brain MRI showed acute non-hemorrhagic right deep cerebral infarct/ischemia and extensive chronic ischemic findings  -HTN management as above, continue ASA and statin  -further management per neurology  6. DM Type 2 - A1c 6.6%, management per IM  7. 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 His strength in his left side is improving, still reports slight weakness in left leg today.   Discussed the need to take BP medications and limit sodium and he is agreeable. Discussed the need for close follow-up with [facility] once discharged. Objective:      Physical Exam:  Visit Vitals    /68 (BP 1 Location: Right arm, BP Patient Position: At rest)    Pulse 70    Temp 98.9 °F (37.2 °C)    Resp 12    Ht 5' 11\" (1.803 m)    Wt 205 lb 12.8 oz (93.4 kg)    SpO2 97%    BMI 28.7 kg/m2     General Appearance:  Well developed, well nourished, alert and oriented x 3, in no acute distress. Ears/Nose/Mouth/Throat:   Hearing grossly normal.         Neck: Supple. Chest:   Lungs clear to auscultation bilaterally. Cardiovascular:  Regular rate and rhythm, S1, S2 normal, no murmur. Abdomen:   Soft, non-tender, bowel sounds are active. Extremities: No edema bilaterally. Equal strength in bilateral UE, left LE slightly weaker than right LE   Skin: Warm and dry. Telemetry: normal sinus rhythm    Data Review:   Labs:    Recent Results (from the past 24 hour(s))   CBC WITH AUTOMATED DIFF    Collection Time: 02/19/18  4:31 AM   Result Value Ref Range    WBC 7.9 4.1 - 11.1 K/uL    RBC 5.90 (H) 4.10 - 5.70 M/uL    HGB 15.4 12.1 - 17.0 g/dL    HCT 47.6 36.6 - 50.3 %    MCV 80.7 80.0 - 99.0 FL    MCH 26.1 26.0 - 34.0 PG    MCHC 32.4 30.0 - 36.5 g/dL    RDW 13.6 11.5 - 14.5 %    PLATELET 555 376 - 188 K/uL    MPV 10.9 8.9 - 12.9 FL    NRBC 0.0 0  WBC    ABSOLUTE NRBC 0.00 0.00 - 0.01 K/uL    NEUTROPHILS 44 32 - 75 %    LYMPHOCYTES 41 12 - 49 %    MONOCYTES 11 5 - 13 %    EOSINOPHILS 2 0 - 7 %    BASOPHILS 1 0 - 1 %    IMMATURE GRANULOCYTES 1 (H) 0.0 - 0.5 %    ABS. NEUTROPHILS 3.5 1.8 - 8.0 K/UL    ABS. LYMPHOCYTES 3.2 0.8 - 3.5 K/UL    ABS. MONOCYTES 0.9 0.0 - 1.0 K/UL    ABS. EOSINOPHILS 0.2 0.0 - 0.4 K/UL    ABS. BASOPHILS 0.1 0.0 - 0.1 K/UL    ABS. IMM.  GRANS. 0.1 (H) 0.00 - 0.04 K/UL    DF AUTOMATED     METABOLIC PANEL, COMPREHENSIVE    Collection Time: 02/19/18  4:31 AM   Result Value Ref Range    Sodium 137 136 - 145 mmol/L    Potassium 4.2 3.5 - 5.1 mmol/L    Chloride 104 97 - 108 mmol/L    CO2 24 21 - 32 mmol/L    Anion gap 9 5 - 15 mmol/L    Glucose 120 (H) 65 - 100 mg/dL    BUN 38 (H) 6 - 20 MG/DL    Creatinine 1.61 (H) 0.70 - 1.30 MG/DL    BUN/Creatinine ratio 24 (H) 12 - 20      GFR est AA 56 (L) >60 ml/min/1.73m2    GFR est non-AA 46 (L) >60 ml/min/1.73m2    Calcium 9.1 8.5 - 10.1 MG/DL    Bilirubin, total 0.5 0.2 - 1.0 MG/DL    ALT (SGPT) 30 12 - 78 U/L    AST (SGOT) 17 15 - 37 U/L    Alk.  phosphatase 110 45 - 117 U/L    Protein, total 7.7 6.4 - 8.2 g/dL    Albumin 3.5 3.5 - 5.0 g/dL    Globulin 4.2 (H) 2.0 - 4.0 g/dL    A-G Ratio 0.8 (L) 1.1 - 2.2             Current Facility-Administered Medications   Medication Dose Route Frequency    hydrALAZINE (APRESOLINE) 20 mg/mL injection 10 mg  10 mg IntraVENous Q6H PRN    chlorthalidone (HYGROTEN) tablet 25 mg  25 mg Oral DAILY    carvedilol (COREG) tablet 12.5 mg  12.5 mg Oral BID    atorvastatin (LIPITOR) tablet 20 mg  20 mg Oral QHS    acetaminophen (TYLENOL) tablet 650 mg  650 mg Oral Q4H PRN    Or    acetaminophen (TYLENOL) solution 650 mg  650 mg Per NG tube Q4H PRN    Or    acetaminophen (TYLENOL) suppository 650 mg  650 mg Rectal Q4H PRN    ondansetron (ZOFRAN) injection 4 mg  4 mg IntraVENous Q6H PRN    aspirin chewable tablet 81 mg  81 mg Oral DAILY    docusate sodium (COLACE) capsule 100 mg  100 mg Oral BID    bisacodyl (DULCOLAX) tablet 5 mg  5 mg Oral DAILY PRN    famotidine (PEPCID) tablet 20 mg  20 mg Oral Q12H    niCARdipine (CARDENE) 25 mg in 0.9% sodium chloride 250 mL infusion  5 mg/hr IntraVENous CONTINUOUS       Mecca Akins MD  [facility] [address] [place] [address] 13, [address] [address]  Bess Patton  [phone]

## 2018-02-19 NOTE — PROGRESS NOTES
Problem: Falls - Risk of  Goal: *Absence of Falls  Document Avinash Fall Risk and appropriate interventions in the flowsheet.    Outcome: Progressing Towards Goal  Fall Risk Interventions:  Mobility Interventions: Assess mobility with egress test, OT consult for ADLs, Patient to call before getting OOB, PT Consult for mobility concerns, PT Consult for assist device competence         Medication Interventions: Patient to call before getting OOB, Teach patient to arise slowly    Elimination Interventions: Call light in reach, Patient to call for help with toileting needs, Toileting schedule/hourly rounds

## 2018-02-20 LAB
ANION GAP SERPL CALC-SCNC: 10 MMOL/L (ref 5–15)
BASOPHILS # BLD: 0.1 K/UL (ref 0–0.1)
BASOPHILS NFR BLD: 1 % (ref 0–1)
BUN SERPL-MCNC: 36 MG/DL (ref 6–20)
BUN/CREAT SERPL: 27 (ref 12–20)
CALCIUM SERPL-MCNC: 8.4 MG/DL (ref 8.5–10.1)
CHLORIDE SERPL-SCNC: 103 MMOL/L (ref 97–108)
CO2 SERPL-SCNC: 22 MMOL/L (ref 21–32)
CREAT SERPL-MCNC: 1.33 MG/DL (ref 0.7–1.3)
DIFFERENTIAL METHOD BLD: ABNORMAL
EOSINOPHIL # BLD: 0.2 K/UL (ref 0–0.4)
EOSINOPHIL NFR BLD: 2 % (ref 0–7)
ERYTHROCYTE [DISTWIDTH] IN BLOOD BY AUTOMATED COUNT: 13.4 % (ref 11.5–14.5)
GLUCOSE SERPL-MCNC: 110 MG/DL (ref 65–100)
HCT VFR BLD AUTO: 43.1 % (ref 36.6–50.3)
HGB BLD-MCNC: 14 G/DL (ref 12.1–17)
IMM GRANULOCYTES # BLD: 0.1 K/UL (ref 0–0.04)
IMM GRANULOCYTES NFR BLD AUTO: 1 % (ref 0–0.5)
LYMPHOCYTES # BLD: 3.8 K/UL (ref 0.8–3.5)
LYMPHOCYTES NFR BLD: 42 % (ref 12–49)
MAGNESIUM SERPL-MCNC: 2.5 MG/DL (ref 1.6–2.4)
MCH RBC QN AUTO: 26.7 PG (ref 26–34)
MCHC RBC AUTO-ENTMCNC: 32.5 G/DL (ref 30–36.5)
MCV RBC AUTO: 82.1 FL (ref 80–99)
MONOCYTES # BLD: 1.2 K/UL (ref 0–1)
MONOCYTES NFR BLD: 13 % (ref 5–13)
NEUTS SEG # BLD: 3.7 K/UL (ref 1.8–8)
NEUTS SEG NFR BLD: 41 % (ref 32–75)
NRBC # BLD: 0 K/UL (ref 0–0.01)
NRBC BLD-RTO: 0 PER 100 WBC
PLATELET # BLD AUTO: 275 K/UL (ref 150–400)
PMV BLD AUTO: 11.4 FL (ref 8.9–12.9)
POTASSIUM SERPL-SCNC: 3.9 MMOL/L (ref 3.5–5.1)
RBC # BLD AUTO: 5.25 M/UL (ref 4.1–5.7)
SODIUM SERPL-SCNC: 135 MMOL/L (ref 136–145)
WBC # BLD AUTO: 9 K/UL (ref 4.1–11.1)

## 2018-02-20 PROCEDURE — 74011250637 HC RX REV CODE- 250/637: Performed by: HOSPITALIST

## 2018-02-20 PROCEDURE — 80048 BASIC METABOLIC PNL TOTAL CA: CPT | Performed by: NURSE PRACTITIONER

## 2018-02-20 PROCEDURE — 65660000000 HC RM CCU STEPDOWN

## 2018-02-20 PROCEDURE — 74011250637 HC RX REV CODE- 250/637: Performed by: NURSE PRACTITIONER

## 2018-02-20 PROCEDURE — 97116 GAIT TRAINING THERAPY: CPT

## 2018-02-20 PROCEDURE — 97112 NEUROMUSCULAR REEDUCATION: CPT

## 2018-02-20 PROCEDURE — 83735 ASSAY OF MAGNESIUM: CPT | Performed by: NURSE PRACTITIONER

## 2018-02-20 PROCEDURE — 74011250636 HC RX REV CODE- 250/636: Performed by: NURSE PRACTITIONER

## 2018-02-20 PROCEDURE — 85025 COMPLETE CBC W/AUTO DIFF WBC: CPT | Performed by: NURSE PRACTITIONER

## 2018-02-20 PROCEDURE — 36415 COLL VENOUS BLD VENIPUNCTURE: CPT | Performed by: NURSE PRACTITIONER

## 2018-02-20 RX ORDER — AMLODIPINE BESYLATE 5 MG/1
10 TABLET ORAL DAILY
Status: DISCONTINUED | OUTPATIENT
Start: 2018-02-21 | End: 2018-02-26 | Stop reason: HOSPADM

## 2018-02-20 RX ADMIN — CHLORTHALIDONE 25 MG: 25 TABLET ORAL at 09:04

## 2018-02-20 RX ADMIN — FAMOTIDINE 20 MG: 20 TABLET, FILM COATED ORAL at 09:08

## 2018-02-20 RX ADMIN — DOCUSATE SODIUM 100 MG: 100 CAPSULE, LIQUID FILLED ORAL at 09:03

## 2018-02-20 RX ADMIN — AMLODIPINE BESYLATE 5 MG: 5 TABLET ORAL at 09:04

## 2018-02-20 RX ADMIN — CARVEDILOL 12.5 MG: 12.5 TABLET, FILM COATED ORAL at 09:04

## 2018-02-20 RX ADMIN — SODIUM CHLORIDE 75 ML/HR: 900 INJECTION, SOLUTION INTRAVENOUS at 14:26

## 2018-02-20 RX ADMIN — CARVEDILOL 12.5 MG: 12.5 TABLET, FILM COATED ORAL at 17:57

## 2018-02-20 RX ADMIN — ATORVASTATIN CALCIUM 20 MG: 40 TABLET, FILM COATED ORAL at 22:02

## 2018-02-20 RX ADMIN — ASPIRIN 81 MG 81 MG: 81 TABLET ORAL at 09:04

## 2018-02-20 RX ADMIN — FAMOTIDINE 20 MG: 20 TABLET, FILM COATED ORAL at 22:02

## 2018-02-20 NOTE — PROGRESS NOTES
Hospitalist Progress Note         TESSY Rincon  Please call  and page for questions. Call physician on-call through the  7pm-7am    Daily Progress Note: 2/20/2018    Primary care provider:None    Date of admission: 2/13/2018 12:39 PM    Admission summery and hospital course:  Kaiden Lee is a 50 y. o. male who presents with PMHx of HTN, noncompliance to medication, unable to say when he last took BP meds, admitted for left sided weakness. Pt states that he noticed it last night involving arm and leg. When asked why he did not seek medical attention last night, he said \"it could wait until morning\".      Subjective:   Patient said he is doing \" great\" today. His friend has brought all necessary paperwork to be considered for aleyda bed   At [facility]. Awaiting home health agency who will be willing to accept him after discharge from rehab, so far no one will commit which is holding up his discharge.  have turned over case to their supervisors at this point. Assessment/Plan:   Hypertensive Urgency  -BP has been stabilized now. Patient received lisinopril 40 mg prior to increasing creatinine, now stopped   - Continue with Coreg 12.5 mg 2 times daily   - BP is ranging 140's-150's today   - norvasc added 2/20- BP improved slightly today, will titrate up again for tomorrow   - Continue  hydralazin as PRN. Acute renal insufficiency:   Probably multifactorial including ACEi and inadequate PO intake. Hold lisinopril and monitor. Improved today to 1.3  Continue IV fluids through tonight - can stop tomorrow with continued renal improvement      Left Hemiparesis likely due to Acute CVA  - ASA, add statin  - Continue PT/OT/ST  - Neurology and cardiology input appreciated.    - CTA head/neck- Multifocal areas of presumably remote ischemia in the basal ganglia.    -MRI showed acute nonhemorrhagic right deep cerebral white matter infarct/ischemia and extensive chronic ischemic findings.   - attempting to transfer patient to inpatient rehab       Abnormal EKG/Elevated troponin:   - CT chest/abd/pel without acute finding. Appreciated cardiology input.   - ECHO with normal EF.        VTE prophylaxis: SCD  Code status: Full  Discussed plan of care with Patient/Family and Nurse. Pre-admission lived at home. Discharge planning: Continue PT/OT, he needs rehab.  are working for the safe discharge. Review of Systems:     Review of Systems:  Symptom  Y/N  Comments   Symptom  Y/N  Comments    Fever/Chills   n   Chest Pain  n    Poor Appetite   n   Edema  n     Cough  n   Abdominal Pain   n    Sputum  n   Joint Pain      SOB/STREET  n   Pruritis/Rash      Nausea/vomit  n   Tolerating PT/OT      Diarrhea  n   Tolerating Diet      Constipation  n   Other      Could not obtain due to:         Objective:   Physical Exam:     Visit Vitals    /84 (BP 1 Location: Right arm, BP Patient Position: At rest)    Pulse 72    Temp 98.3 °F (36.8 °C)    Resp 16    Ht 5' 11\" (1.803 m)    Wt 95.1 kg (209 lb 11.2 oz)    SpO2 96%    BMI 29.25 kg/m2      O2 Device: Room air    Temp (24hrs), Av.6 °F (37 °C), Min:98.3 °F (36.8 °C), Max:99 °F (37.2 °C)     07 -  1900  In: -   Out: 800 [Urine:800]   1901 -  0700  In: 150 [I.V.:150]  Out: 800 [Urine:800]     General:  Alert, cooperative, no distress, appears stated age. He is comfortably lying in the bed. Lungs:   Clear to auscultation bilaterally. Chest wall:  No tenderness or deformity. Heart:  Regular rate and rhythm, S1, S2 normal, no murmur. Abdomen:   Soft, non-tender. Bowel sounds normal.    Extremities: Extremities normal, atraumatic, no cyanosis or edema. Pulses: 2+ and symmetric all extremities.    Skin: Skin color, texture, turgor normal. No rashes or lesions   Neurologic: LLE strength is 4/5            Data Review:       Recent Days:  Recent Labs 02/20/18   0234  02/19/18   0431   WBC  9.0  7.9   HGB  14.0  15.4   HCT  43.1  47.6   PLT  275  287     Recent Labs      02/20/18   0234  02/19/18   0431  02/18/18   0301   NA  135*  137  137   K  3.9  4.2  4.0   CL  103  104  103   CO2  22  24  26   GLU  110*  120*  111*   BUN  36*  38*  30*   CREA  1.33*  1.61*  1.39*   CA  8.4*  9.1  9.2   MG  2.5*   --   2.2   ALB   --   3.5   --    SGOT   --   17   --    ALT   --   30   --      No results for input(s): PH, PCO2, PO2, HCO3, FIO2 in the last 72 hours. 24 Hour Results:  Recent Results (from the past 24 hour(s))   METABOLIC PANEL, BASIC    Collection Time: 02/20/18  2:34 AM   Result Value Ref Range    Sodium 135 (L) 136 - 145 mmol/L    Potassium 3.9 3.5 - 5.1 mmol/L    Chloride 103 97 - 108 mmol/L    CO2 22 21 - 32 mmol/L    Anion gap 10 5 - 15 mmol/L    Glucose 110 (H) 65 - 100 mg/dL    BUN 36 (H) 6 - 20 MG/DL    Creatinine 1.33 (H) 0.70 - 1.30 MG/DL    BUN/Creatinine ratio 27 (H) 12 - 20      GFR est AA >60 >60 ml/min/1.73m2    GFR est non-AA 57 (L) >60 ml/min/1.73m2    Calcium 8.4 (L) 8.5 - 10.1 MG/DL   MAGNESIUM    Collection Time: 02/20/18  2:34 AM   Result Value Ref Range    Magnesium 2.5 (H) 1.6 - 2.4 mg/dL   CBC WITH AUTOMATED DIFF    Collection Time: 02/20/18  2:34 AM   Result Value Ref Range    WBC 9.0 4.1 - 11.1 K/uL    RBC 5.25 4. 10 - 5.70 M/uL    HGB 14.0 12.1 - 17.0 g/dL    HCT 43.1 36.6 - 50.3 %    MCV 82.1 80.0 - 99.0 FL    MCH 26.7 26.0 - 34.0 PG    MCHC 32.5 30.0 - 36.5 g/dL    RDW 13.4 11.5 - 14.5 %    PLATELET 654 430 - 258 K/uL    MPV 11.4 8.9 - 12.9 FL    NRBC 0.0 0  WBC    ABSOLUTE NRBC 0.00 0.00 - 0.01 K/uL    NEUTROPHILS 41 32 - 75 %    LYMPHOCYTES 42 12 - 49 %    MONOCYTES 13 5 - 13 %    EOSINOPHILS 2 0 - 7 %    BASOPHILS 1 0 - 1 %    IMMATURE GRANULOCYTES 1 (H) 0.0 - 0.5 %    ABS. NEUTROPHILS 3.7 1.8 - 8.0 K/UL    ABS. LYMPHOCYTES 3.8 (H) 0.8 - 3.5 K/UL    ABS. MONOCYTES 1.2 (H) 0.0 - 1.0 K/UL    ABS.  EOSINOPHILS 0.2 0.0 - 0.4 K/UL    ABS. BASOPHILS 0.1 0.0 - 0.1 K/UL    ABS. IMM. GRANS. 0.1 (H) 0.00 - 0.04 K/UL    DF AUTOMATED         Problem List:  Problem List as of 2/20/2018  Never Reviewed          Codes Class Noted - Resolved    Left-sided weakness ICD-10-CM: R53.1  ICD-9-CM: 728.87  2/13/2018 - Present        Hypertensive urgency ICD-10-CM: I16.0  ICD-9-CM: 401.9  2/13/2018 - Present        Abnormal EKG ICD-10-CM: R94.31  ICD-9-CM: 794.31  2/13/2018 - Present        Acute CVA (cerebrovascular accident) [facility]) ICD-10-CM: I63.9  ICD-9-CM: 434.91  2/13/2018 - Present              Medications reviewed  Current Facility-Administered Medications   Medication Dose Route Frequency    0.9% sodium chloride infusion  75 mL/hr IntraVENous CONTINUOUS    amLODIPine (NORVASC) tablet 5 mg  5 mg Oral DAILY    hydrALAZINE (APRESOLINE) 20 mg/mL injection 10 mg  10 mg IntraVENous Q6H PRN    chlorthalidone (HYGROTEN) tablet 25 mg  25 mg Oral DAILY    carvedilol (COREG) tablet 12.5 mg  12.5 mg Oral BID    atorvastatin (LIPITOR) tablet 20 mg  20 mg Oral QHS    acetaminophen (TYLENOL) tablet 650 mg  650 mg Oral Q4H PRN    Or    acetaminophen (TYLENOL) solution 650 mg  650 mg Per NG tube Q4H PRN    Or    acetaminophen (TYLENOL) suppository 650 mg  650 mg Rectal Q4H PRN    ondansetron (ZOFRAN) injection 4 mg  4 mg IntraVENous Q6H PRN    aspirin chewable tablet 81 mg  81 mg Oral DAILY    docusate sodium (COLACE) capsule 100 mg  100 mg Oral BID    bisacodyl (DULCOLAX) tablet 5 mg  5 mg Oral DAILY PRN    famotidine (PEPCID) tablet 20 mg  20 mg Oral Q12H       Care Plan discussed with: Patient/Family and Nurse    Total time spent with patient: 30 minutes.     Paul Mendiola NP

## 2018-02-20 NOTE — PROGRESS NOTES
Problem: Self Care Deficits Care Plan (Adult)  Goal: *Acute Goals and Plan of Care (Insert Text)  Occupational Therapy Goals  Initiated 2/14/2018   1. Patient will perform lower body dressing with independence within 7 day(s). 2.  Patient will perform bathing with modified independence within 7 day(s). 3.  Patient will perform upper body ADLs standing at sink for 5 minutes without signs of fatigue or LOB within 7 day(s). 4.  Patient will perform toilet transfers with independence within 7 day(s). 5.  Patient will perform all aspects of toileting with independence within 7 day(s). 6.  Patient will participate in upper extremity therapeutic exercise/activities with independence for 10 minutes within 7 day(s). 7.  Patient will utilize energy conservation techniques during functional activities with verbal cues within 7 day(s). Occupational Therapy TREATMENT  Patient: Noemi Vitale (50 y.o. male)  Date: 2/20/2018  Diagnosis: Left-sided weakness  Hypertensive urgency  Acute CVA (cerebrovascular accident) [facility])  Hypertensive urgency  Abnormal EKG <principal problem not specified>       Precautions:    Chart, occupational therapy assessment, plan of care, and goals were reviewed. ASSESSMENT:  Based on the objective data below, the patient participated in bed mobility, sit to stand, LE self care and dynamic standing exercises with supervision to min assist. Not progressing patient to using bathroom toilet versus BSC secondary to patient with hyperextension of left knee with ambulation and need for assist to prevent damage to knee per Physical Therapy. Performance impacted by impaired strength of left LE, impaired dynamic standing for self care and mobilization to bathroom, shower and retrieving items as needed. Recommend rehab.   Progression toward goals:  [x]          Improving appropriately and progressing toward goals  []          Improving slowly and progressing toward goals  []          Not making progress toward goals and plan of care will be adjusted     PLAN:  Patient continues to benefit from skilled intervention to address the above impairments. Continue treatment per established plan of care. Discharge Recommendations:  Inpatient Rehab  Further Equipment Recommendations for Discharge:  none     SUBJECTIVE:   Patient stated I sat up today.     OBJECTIVE DATA SUMMARY:   Cognitive/Behavioral Status:  Neurologic State: Alert  Orientation Level: Oriented X4  Cognition: Appropriate for age attention/concentration; Follows commands  Perception: Appears intact  Perseveration: No perseveration noted  Safety/Judgement: Awareness of environment  Functional Mobility and Transfers for ADLs:   Bed Mobility:     Supine to Sit: Assist x1;Supervision  Sit to Supine: Supervision;Assist x1        Transfers:  Sit to Stand: Contact guard assistance;Assist x1  Functional Transfers  Adaptive Equipment: Bedside commode (using BSC secondary to hyperextension of left LE with ambulation, requiring assist to prevent damage to knee )    Balance:  Sitting: Impaired  Sitting - Static: Good (unsupported)  Sitting - Dynamic: Fair (occasional)  Standing: Impaired  Standing - Static: Fair  Standing - Dynamic : Fair  ADL Intervention:            Upper Body Bathing  Bathing Assistance: Supervision/set-up  Position Performed: Seated edge of bed    Lower Body Bathing  Perineal  : Minimum assistance  Position Performed: Standing  Adaptive Equipment: Walker  Lower Body : Supervision/set-up  Position Performed: Seated edge of bed    Upper Body Dressing Assistance  Dressing Assistance: Supervision/set-up    Lower Body Dressing Assistance  Pants With Elastic Waist: Minimum assistance  Socks: Minimum assistance  Leg Crossed Method Used: Yes  Position Performed: Seated edge of bed (min assist for balance to manage dynamic seated bakance)         Cognitive Retraining  Safety/Judgement: Awareness of environment    Neuro Re-Education: Dynamic standing with unilateral support and no support and min assist for reaching overhead, turning to look over each shoulder, reach to knees and reaching to lateral knees for 1 set of 5 reps. Activity Tolerance:    Fair  Please refer to the flowsheet for vital signs taken during this treatment.   After treatment:   []  Patient left in no apparent distress sitting up in chair  [x]  Patient left in no apparent distress in bed  [x]  Call bell left within reach  [x]  Nursing notified  []  Caregiver present  []  Bed alarm activated    COMMUNICATION/COLLABORATION:   The patients plan of care was discussed with: Physical Therapist and Registered Nurse    DEAN Worrell  Time Calculation: 14 mins

## 2018-02-20 NOTE — PROGRESS NOTES
NUTRITION  Pt seen for:     [x]           LOS        RECOMMENDATIONS:   1. Continue current diet order  2. Weekly weights    SUBJECTIVE/OBJECTIVE:   Information obtained from:  Chart review, discussion with RN. Pt admitted for CVA. Placement pending for inpatient rehab. Pt unavailable at time of visit but spoke with RN who notes appetite good with no issues.       Diet:  cardiac  Intake: [x]           Good     []           Fair      []           Poor     Weight Changes:   [x]            Stable  Wt Readings from Last 10 Encounters:   02/20/18 95.1 kg (209 lb 11.2 oz)   02/13/18 90.3 kg (199 lb)   10/14/17 98.9 kg (218 lb)     Nutrition Problems Identified:  [x]      None    PLAN:   []           Obtained/adjusted food preferences/tolerances and/or snacks options   [x]           Rescreen per screening protocol  []           Add Supplements    Rescreen:  []            At Nutrition Risk           [x]            Not at Nutrition Risk, rescreen per screening protocol    Ros Swanson RD

## 2018-02-20 NOTE — PROGRESS NOTES
Bedside and Verbal shift change report given to Elke Hall (oncoming nurse) by Macario Obando (offgoing nurse). Report included the following information SBAR, Kardex, ED Summary, Procedure Summary, Intake/Output, MAR, Recent Results, Med Rec Status and Cardiac Rhythm NSR with ST depression.

## 2018-02-20 NOTE — ROUTINE PROCESS
Bedside and Verbal shift change report given to Yonas Hoffman (oncoming nurse) by Douglas Guerra (offgoing nurse). Report included the following information SBAR, Kardex, Intake/Output, MAR, Recent Results, Med Rec Status and Cardiac Rhythm NSR.

## 2018-02-20 NOTE — PROGRESS NOTES
Hospital follow-up PCP transitional care appointment from [address] [place] [address] has been scheduled with Candace Jiménez NP for Thursday, 3/15/18 at 1:00 p.m. Patient needs to arrive 15 minutes early with photo ID, insurance card and a listing of all medications. Pending patient discharge.   Sarika Armstrong, Care Management Specialist.

## 2018-02-20 NOTE — PROGRESS NOTES
CM called and spoke with Rubi Saini with [facility] still following, however, still requesting a Home Health agency that will guarantee acceptance of patient upon discharge from Inpatient Rehab- referral was sent last week to [facility], agency cannot accept this far in advance due to inability to anticipate the patient's needs. CM escalated to [facility] supervisor Deonte Mao to consult. CM met with patient at bedside to inquire if his friends brought in the necessary paperwork- financial letter of responsibility and [phone]. The patient stated \"nobody worked yesterday\" and stated none of his friends brought the paperwork. Patient is aware that this paperwork is needed to be considered for Inpatient Rehab- patient stated that someone \"should bring it up around noon. \" CM will continue to follow. BEN Elizabeth CM met with patient at bedside and spoke with Steven Nevarez with [facility]- patient provided necessary paperwork. Steven Nevarez stated that the application has been forwarded to the financial team for review/approval. The largest barrier to discharge is currently the patient does not have a [facility] agency that has stated they will accept patient upon discharge from Inpatient Rehab- escalated to supervisor. CM will continue to follow. BEN Elizabeth CM contacted Linda Green, [facility] specialist, to attempt to have PCP appointment scheduled for 2/21 with Dr. Marie Blount at 11:00 a.m. rescheduled since patient is still admitted.  CM will continue to follow

## 2018-02-20 NOTE — PROGRESS NOTES
Hospitalist Progress Note         Alexa Ribera AGACNP  Please call  and page for questions. Call physician on-call through the  7pm-7am    Daily Progress Note: 2/19/2018    Primary care provider:None    Date of admission: 2/13/2018 12:39 PM    Admission summery and hospital course:  Luke Lee is a 50 y. o. male who presents with PMHx of HTN, noncompliance to medication, unable to say when he last took BP meds, admitted for left sided weakness. Pt states that he noticed it last night involving arm and leg. When asked why he did not seek medical attention last night, he said \"it could wait until morning\".      Subjective:   Patient said he is doing \" great\" today. We are still awaiting his friend to bring paperwork to hospital which will aid us in getting him approved for a aleyda bed. Assessment/Plan:   Hypertensive Urgency  -BP has been stabilized now. Patient received lisinopril 40 mg prior to increasing creatinine, now stopped   - Continue with Coreg 12.5 mg 2 times daily   - BP is ranging 140's-150's today   - will add norvasc on for tomorrow   - Continue  hydralazin as PRN. Acute renal insufficiency:   Probably multifactorial including ACEi and inadequate PO intake. Hold lisinopril and monitor. Worsening today 2/19, creatinine is 1.61, if increases again tomorrow will order   Renal consult   Start IV fluid hydration overnight       Left Hemiparesis likely due to Acute CVA  - ASA, add statin  - Continue PT/OT/ST  - Neurology and cardiology input appreciated.    - CTA head/neck- Multifocal areas of presumably remote ischemia in the basal ganglia. -MRI showed acute nonhemorrhagic right deep cerebral white matter infarct/ischemia and extensive chronic ischemic findings.   - attempting to transfer patient to inpatient rehab       Abnormal EKG/Elevated troponin:   - CT chest/abd/pel without acute finding.  Appreciated cardiology input.   - ECHO with normal EF.        VTE prophylaxis: SCD  Code status: Full  Discussed plan of care with Patient/Family and Nurse. Pre-admission lived at home. Discharge planning: Continue PT/OT, he needs rehab.  are working for the safe discharge. Review of Systems:     Review of Systems:  Symptom  Y/N  Comments   Symptom  Y/N  Comments    Fever/Chills   n   Chest Pain  n    Poor Appetite   n   Edema  n     Cough  n   Abdominal Pain   n    Sputum  n   Joint Pain      SOB/STREET  n   Pruritis/Rash      Nausea/vomit  n   Tolerating PT/OT      Diarrhea  n   Tolerating Diet      Constipation  n   Other      Could not obtain due to:         Objective:   Physical Exam:     Visit Vitals    /84 (BP 1 Location: Right arm, BP Patient Position: At rest)    Pulse 69    Temp 98.9 °F (37.2 °C)    Resp 19    Ht 5' 11\" (1.803 m)    Wt 93.4 kg (205 lb 12.8 oz)    SpO2 96%    BMI 28.7 kg/m2      O2 Device: Room air    Temp (24hrs), Av.4 °F (36.9 °C), Min:97.7 °F (36.5 °C), Max:98.9 °F (37.2 °C)         0701 -  1900  In: -   Out: 3209 [Urine:1275]     General:  Alert, cooperative, no distress, appears stated age. He is comfortably lying in the bed. Lungs:   Clear to auscultation bilaterally. Chest wall:  No tenderness or deformity. Heart:  Regular rate and rhythm, S1, S2 normal, no murmur. Abdomen:   Soft, non-tender. Bowel sounds normal.    Extremities: Extremities normal, atraumatic, no cyanosis or edema. Pulses: 2+ and symmetric all extremities.    Skin: Skin color, texture, turgor normal. No rashes or lesions   Neurologic: LLE strength is 4/5            Data Review:       Recent Days:  Recent Labs      18   0431   WBC  7.9   HGB  15.4   HCT  47.6   PLT  287     Recent Labs      18   0431  18   0301   NA  137  137   K  4.2  4.0   CL  104  103   CO2  24  26   GLU  120*  111*   BUN  38*  30*   CREA  1.61*  1.39*   CA  9.1  9.2   MG   --   2.2   ALB  3.5   --    SGOT  17 --    ALT  30   --      No results for input(s): PH, PCO2, PO2, HCO3, FIO2 in the last 72 hours. 24 Hour Results:  Recent Results (from the past 24 hour(s))   CBC WITH AUTOMATED DIFF    Collection Time: 02/19/18  4:31 AM   Result Value Ref Range    WBC 7.9 4.1 - 11.1 K/uL    RBC 5.90 (H) 4.10 - 5.70 M/uL    HGB 15.4 12.1 - 17.0 g/dL    HCT 47.6 36.6 - 50.3 %    MCV 80.7 80.0 - 99.0 FL    MCH 26.1 26.0 - 34.0 PG    MCHC 32.4 30.0 - 36.5 g/dL    RDW 13.6 11.5 - 14.5 %    PLATELET 100 513 - 484 K/uL    MPV 10.9 8.9 - 12.9 FL    NRBC 0.0 0  WBC    ABSOLUTE NRBC 0.00 0.00 - 0.01 K/uL    NEUTROPHILS 44 32 - 75 %    LYMPHOCYTES 41 12 - 49 %    MONOCYTES 11 5 - 13 %    EOSINOPHILS 2 0 - 7 %    BASOPHILS 1 0 - 1 %    IMMATURE GRANULOCYTES 1 (H) 0.0 - 0.5 %    ABS. NEUTROPHILS 3.5 1.8 - 8.0 K/UL    ABS. LYMPHOCYTES 3.2 0.8 - 3.5 K/UL    ABS. MONOCYTES 0.9 0.0 - 1.0 K/UL    ABS. EOSINOPHILS 0.2 0.0 - 0.4 K/UL    ABS. BASOPHILS 0.1 0.0 - 0.1 K/UL    ABS. IMM. GRANS. 0.1 (H) 0.00 - 0.04 K/UL    DF AUTOMATED     METABOLIC PANEL, COMPREHENSIVE    Collection Time: 02/19/18  4:31 AM   Result Value Ref Range    Sodium 137 136 - 145 mmol/L    Potassium 4.2 3.5 - 5.1 mmol/L    Chloride 104 97 - 108 mmol/L    CO2 24 21 - 32 mmol/L    Anion gap 9 5 - 15 mmol/L    Glucose 120 (H) 65 - 100 mg/dL    BUN 38 (H) 6 - 20 MG/DL    Creatinine 1.61 (H) 0.70 - 1.30 MG/DL    BUN/Creatinine ratio 24 (H) 12 - 20      GFR est AA 56 (L) >60 ml/min/1.73m2    GFR est non-AA 46 (L) >60 ml/min/1.73m2    Calcium 9.1 8.5 - 10.1 MG/DL    Bilirubin, total 0.5 0.2 - 1.0 MG/DL    ALT (SGPT) 30 12 - 78 U/L    AST (SGOT) 17 15 - 37 U/L    Alk.  phosphatase 110 45 - 117 U/L    Protein, total 7.7 6.4 - 8.2 g/dL    Albumin 3.5 3.5 - 5.0 g/dL    Globulin 4.2 (H) 2.0 - 4.0 g/dL    A-G Ratio 0.8 (L) 1.1 - 2.2         Problem List:  Problem List as of 2/19/2018  Never Reviewed          Codes Class Noted - Resolved    Left-sided weakness ICD-10-CM: R53.1  ICD-9-CM: 728.87  2/13/2018 - Present        Hypertensive urgency ICD-10-CM: I16.0  ICD-9-CM: 401.9  2/13/2018 - Present        Abnormal EKG ICD-10-CM: R94.31  ICD-9-CM: 794.31  2/13/2018 - Present        Acute CVA (cerebrovascular accident) [facility]) ICD-10-CM: I63.9  ICD-9-CM: 434.91  2/13/2018 - Present              Medications reviewed  Current Facility-Administered Medications   Medication Dose Route Frequency    hydrALAZINE (APRESOLINE) 20 mg/mL injection 10 mg  10 mg IntraVENous Q6H PRN    chlorthalidone (HYGROTEN) tablet 25 mg  25 mg Oral DAILY    carvedilol (COREG) tablet 12.5 mg  12.5 mg Oral BID    atorvastatin (LIPITOR) tablet 20 mg  20 mg Oral QHS    acetaminophen (TYLENOL) tablet 650 mg  650 mg Oral Q4H PRN    Or    acetaminophen (TYLENOL) solution 650 mg  650 mg Per NG tube Q4H PRN    Or    acetaminophen (TYLENOL) suppository 650 mg  650 mg Rectal Q4H PRN    ondansetron (ZOFRAN) injection 4 mg  4 mg IntraVENous Q6H PRN    aspirin chewable tablet 81 mg  81 mg Oral DAILY    docusate sodium (COLACE) capsule 100 mg  100 mg Oral BID    bisacodyl (DULCOLAX) tablet 5 mg  5 mg Oral DAILY PRN    famotidine (PEPCID) tablet 20 mg  20 mg Oral Q12H    niCARdipine (CARDENE) 25 mg in 0.9% sodium chloride 250 mL infusion  5 mg/hr IntraVENous CONTINUOUS       Care Plan discussed with: Patient/Family and Nurse    Total time spent with patient: 30 minutes.     Navin Arreguin NP

## 2018-02-20 NOTE — PROGRESS NOTES
Problem: Falls - Risk of  Goal: *Absence of Falls  Document Avinash Fall Risk and appropriate interventions in the flowsheet. Outcome: Progressing Towards Goal  Fall Risk Interventions:  Mobility Interventions: Patient to call before getting OOB         Medication Interventions: Patient to call before getting OOB    Elimination Interventions: Call light in reach, Patient to call for help with toileting needs             Comments: Patient aware that he must call for assisstance before getting OOB.

## 2018-02-20 NOTE — PROGRESS NOTES
Problem: Mobility Impaired (Adult and Pediatric)  Goal: *Acute Goals and Plan of Care (Insert Text)  Physical Therapy Goals  Initiated 2/14/2018  1. Patient will move from supine to sit and sit to supine  in bed with modified independence within 7 day(s). 2.  Patient will transfer from bed to chair and chair to bed with supervision/set-up using the least restrictive device within 7 day(s). 3.  Patient will perform sit to stand with supervision/set-up within 7 day(s). 4.  Patient will ambulate with minimal assistance/contact guard assist for 150 feet with the least restrictive device within 7 day(s). 5.  Patient will ascend/descend 6 stairs with 1 handrail(s) with minimal assistance/contact guard assist within 7 day(s). 6.  Patient will improve Mendiola Balance score by 7 points within 7 days. physical Therapy TREATMENT  Patient: Hair Valentino (50 y.o. male)  Date: 2/20/2018  Diagnosis: Left-sided weakness  Hypertensive urgency  Acute CVA (cerebrovascular accident) [facility])  Hypertensive urgency  Abnormal EKG <principal problem not specified>       Precautions:    Chart, physical therapy assessment, plan of care and goals were reviewed. ASSESSMENT:  Cleared for mobility progression by RN. Received pt supine in bed, motivated and agreeable to participation in tx session. Continues to report L hip discomfort but no sharp/stabbing pain. Gait training completed with Rw and assist x2, cues for maintaining upright posture. Initially, provided manual facilitation of achieving appropriate L LE clearance through swing, placement on initial contact, and maintaining neutral knee extension during stance phase however with duration, pt able to demo good carryover for 2-3 steps w/o external cues or assist. Worked on 5x sit<>stands for functional strengthening and incorporation of neuroplasticity principles - demos marked improved quad control and able to complete each rep without L knee hyperextension.  He continues to make daily improvements with acute care PT and would greatly benefit from discharge to acute IP rehab setting I- he is an excellent candidate and motivated to participate. Remained up in chair at end of session, NAD. Progression toward goals:  [x]       Improving appropriately and progressing toward goals  []       Improving slowly and progressing toward goals  []       Not making progress toward goals and plan of care will be adjusted     PLAN:  Patient continues to benefit from skilled intervention to address the above impairments. Continue treatment per established plan of care. Discharge Recommendations:  Inpatient Rehab  Further Equipment Recommendations for Discharge:  TBD     SUBJECTIVE:   Patient stated I'm good, I'll tell you if I'm not I promise.     OBJECTIVE DATA SUMMARY:   Critical Behavior:  Neurologic State: Alert  Orientation Level: Oriented X4  Cognition: Appropriate decision making, Appropriate for age attention/concentration, Appropriate safety awareness, Follows commands  Safety/Judgement: Awareness of environment  Functional Mobility Training:  Bed Mobility:     Supine to Sit: Stand-by asssistance     Transfers:  Sit to Stand: Contact guard assistance  Stand to Sit: Contact guard assistance     Balance:  Sitting: Intact  Sitting - Static: Good (unsupported)  Sitting - Dynamic: Good (unsupported)  Standing: Impaired; With support  Standing - Static: Fair;Constant support  Standing - Dynamic : Poor  Ambulation/Gait Training:  Distance (ft): 100 Feet (ft)  Assistive Device: Gait belt;Walker, rolling  Ambulation - Level of Assistance: Assist x2;Minimal assistance   Gait Abnormalities: Ataxic; Altered arm swing;Decreased step clearance; Hemiplegic; Step to gait  Stance: Left decreased  Speed/Estrellita: Slow  Step Length: Left lengthened  Swing Pattern: Left asymmetrical    Pain:  Pain Scale 1: Numeric (0 - 10)  Pain Intensity 1: 6  Pain Location 1: Leg  Pain Orientation 1: Left     Activity Tolerance:   NAD    Please refer to the flowsheet for vital signs taken during this treatment. After treatment:   [x] Patient left in no apparent distress sitting up in chair  [] Patient left in no apparent distress in bed  [x] Call bell left within reach  [x] Nursing notified  [] Caregiver present  [] Bed alarm activated    COMMUNICATION/EDUCATION:   The patients plan of care was discussed with: Certified Occupational Therapy Assistant and Registered Nurse. Patient was educated regarding His deficit(s) of L hemiparesis as this relates to His diagnosis of CVA. He demonstrated Good understanding as evidenced by nodding. Patient and/or family was verbally educated on the BE FAST acronym for signs/symptoms of CVA and TIA. BE FAST was written on patient's communication board  for visual education and reinforcement. All questions answered with patient indicating good understanding. [x]  Fall prevention education was provided and the patient/caregiver indicated understanding. [x]  Patient/family have participated as able in goal setting and plan of care. [x]  Patient/family agree to work toward stated goals and plan of care. []  Patient understands intent and goals of therapy, but is neutral about his/her participation. []  Patient is unable to participate in goal setting and plan of care.     Thank you for this referral.  Katelynn Prather PT, DPT   Time Calculation: 21 mins

## 2018-02-20 NOTE — PROGRESS NOTES
Problem: Falls - Risk of  Goal: *Absence of Falls  Document Avinash Fall Risk and appropriate interventions in the flowsheet.    Outcome: Progressing Towards Goal  Fall Risk Interventions:  Mobility Interventions: Assess mobility with egress test, Patient to call before getting OOB, Utilize walker, cane, or other assitive device         Medication Interventions: Patient to call before getting OOB, Teach patient to arise slowly    Elimination Interventions: Call light in reach, Patient to call for help with toileting needs, Toilet paper/wipes in reach, Toileting schedule/hourly rounds

## 2018-02-21 PROCEDURE — 74011250637 HC RX REV CODE- 250/637: Performed by: HOSPITALIST

## 2018-02-21 PROCEDURE — 65660000000 HC RM CCU STEPDOWN

## 2018-02-21 PROCEDURE — 97116 GAIT TRAINING THERAPY: CPT

## 2018-02-21 PROCEDURE — 74011250637 HC RX REV CODE- 250/637: Performed by: NURSE PRACTITIONER

## 2018-02-21 PROCEDURE — 97535 SELF CARE MNGMENT TRAINING: CPT

## 2018-02-21 RX ORDER — HYDRALAZINE HYDROCHLORIDE 20 MG/ML
10 INJECTION INTRAMUSCULAR; INTRAVENOUS
Status: DISCONTINUED | OUTPATIENT
Start: 2018-02-21 | End: 2018-02-22

## 2018-02-21 RX ORDER — CARVEDILOL 12.5 MG/1
25 TABLET ORAL 2 TIMES DAILY
Status: DISCONTINUED | OUTPATIENT
Start: 2018-02-21 | End: 2018-02-24

## 2018-02-21 RX ADMIN — DOCUSATE SODIUM 100 MG: 100 CAPSULE, LIQUID FILLED ORAL at 08:53

## 2018-02-21 RX ADMIN — AMLODIPINE BESYLATE 10 MG: 5 TABLET ORAL at 08:53

## 2018-02-21 RX ADMIN — CARVEDILOL 25 MG: 12.5 TABLET, FILM COATED ORAL at 17:39

## 2018-02-21 RX ADMIN — ATORVASTATIN CALCIUM 20 MG: 40 TABLET, FILM COATED ORAL at 21:17

## 2018-02-21 RX ADMIN — CHLORTHALIDONE 25 MG: 25 TABLET ORAL at 08:53

## 2018-02-21 RX ADMIN — ASPIRIN 81 MG 81 MG: 81 TABLET ORAL at 08:53

## 2018-02-21 RX ADMIN — FAMOTIDINE 20 MG: 20 TABLET, FILM COATED ORAL at 08:53

## 2018-02-21 RX ADMIN — FAMOTIDINE 20 MG: 20 TABLET, FILM COATED ORAL at 21:17

## 2018-02-21 RX ADMIN — CARVEDILOL 12.5 MG: 12.5 TABLET, FILM COATED ORAL at 08:53

## 2018-02-21 NOTE — PROGRESS NOTES
Hospitalist Progress Note         Olga Lidia Tejeda AGACNP  Please call  and page for questions. Call physician on-call through the  7pm-7am    Daily Progress Note: 2/21/2018    Primary care provider:None    Date of admission: 2/13/2018 12:39 PM    Admission summery and hospital course:  Chelsea Lee is a 50 y. o. male who presents with PMHx of HTN, noncompliance to medication, unable to say when he last took BP meds, admitted for left sided weakness. Pt states that he noticed it last night involving arm and leg. When asked why he did not seek medical attention last night, he said \"it could wait until morning\".      Subjective:   F/u for resistant Hypertension. He has no new complaints     Assessment/Plan:   Hypertensive Urgency: Secondary to medication non compliance  -BP still not optimally controlled. - Continue Norvasc 10 mg PO daily, Chlorthalidone, and increase coreg to 25 mg Po BID.  - Continue Hydralazine as necessary for SBP > 160 mm hg   - low salt diet     Acute renal insufficiency:   Probably multifactorial including ACEi and inadequate PO intake. Hold lisinopril and monitor. Improved today to 1.3  Continue IV fluids through tonight - can stop tomorrow with continued renal improvement      Left Hemiparesis likely due to Acute CVA  - ASA, add statin  - Continue PT/OT/ST  - Neurology and cardiology input appreciated.    - CTA head/neck- Multifocal areas of presumably remote ischemia in the basal ganglia. -MRI showed acute nonhemorrhagic right deep cerebral white matter infarct/ischemia and extensive chronic ischemic findings.   - Left sided weakness still persist  - continue PT/OT      Abnormal EKG/Elevated troponin:   - CT chest/abd/pel without acute finding. Appreciated cardiology input.   - ECHO with normal EF.        VTE prophylaxis: SCD  Code status: Full  Discussed plan of care with Patient/Family and Nurse. Pre-admission lived at home. Discharge planning: Pending placement       Review of Systems:     Review of Systems:  Symptom  Y/N  Comments   Symptom  Y/N  Comments    Fever/Chills   n   Chest Pain  n    Poor Appetite   n   Edema  n     Cough  n   Abdominal Pain   n    Sputum  n   Joint Pain      SOB/STREET  n   Pruritis/Rash      Nausea/vomit  n   Tolerating PT/OT      Diarrhea  n   Tolerating Diet      Constipation  n   Other      Could not obtain due to:         Objective:   Physical Exam:     Visit Vitals    BP (!) 166/92 (BP 1 Location: Right arm, BP Patient Position: At rest)    Pulse 85    Temp 98.6 °F (37 °C)    Resp 20    Ht 5' 11\" (1.803 m)    Wt 95.5 kg (210 lb 9.6 oz)    SpO2 98%    BMI 29.37 kg/m2      O2 Device: Room air    Temp (24hrs), Av.7 °F (37.1 °C), Min:98.3 °F (36.8 °C), Max:99 °F (37.2 °C)         190 -  0700  In: 150 [I.V.:150]  Out: 1100 [Urine:1100]     General:  Alert, cooperative, no distress, appears stated age. He is comfortably lying in the bed. Lungs:   Clear to auscultation bilaterally. Chest wall:  No tenderness or deformity. Heart:  Regular rate and rhythm, S1, S2 normal, no murmur. Abdomen:   Soft, non-tender. Bowel sounds normal.    Extremities: Extremities normal, atraumatic, no cyanosis or edema. Pulses: 2+ and symmetric all extremities. Skin: Skin color, texture, turgor normal. No rashes or lesions   Neurologic: LLE strength is 4/5            Data Review:       Recent Days:  Recent Labs      18   0234  18   0431   WBC  9.0  7.9   HGB  14.0  15.4   HCT  43.1  47.6   PLT  275  287     Recent Labs      18   0234  18   0431   NA  135*  137   K  3.9  4.2   CL  103  104   CO2  22  24   GLU  110*  120*   BUN  36*  38*   CREA  1.33*  1.61*   CA  8.4*  9.1   MG  2.5*   --    ALB   --   3.5   SGOT   --   17   ALT   --   30     No results for input(s): PH, PCO2, PO2, HCO3, FIO2 in the last 72 hours.     24 Hour Results:  No results found for this or any previous visit (from the past 24 hour(s)). Problem List:  Problem List as of 2/21/2018  Never Reviewed          Codes Class Noted - Resolved    Left-sided weakness ICD-10-CM: R53.1  ICD-9-CM: 728.87  2/13/2018 - Present        Hypertensive urgency ICD-10-CM: I16.0  ICD-9-CM: 401.9  2/13/2018 - Present        Abnormal EKG ICD-10-CM: R94.31  ICD-9-CM: 794.31  2/13/2018 - Present        Acute CVA (cerebrovascular accident) [facility]) ICD-10-CM: I63.9  ICD-9-CM: 434.91  2/13/2018 - Present              Medications reviewed  Current Facility-Administered Medications   Medication Dose Route Frequency    carvedilol (COREG) tablet 25 mg  25 mg Oral BID    amLODIPine (NORVASC) tablet 10 mg  10 mg Oral DAILY    hydrALAZINE (APRESOLINE) 20 mg/mL injection 10 mg  10 mg IntraVENous Q6H PRN    chlorthalidone (HYGROTEN) tablet 25 mg  25 mg Oral DAILY    atorvastatin (LIPITOR) tablet 20 mg  20 mg Oral QHS    acetaminophen (TYLENOL) tablet 650 mg  650 mg Oral Q4H PRN    Or    acetaminophen (TYLENOL) solution 650 mg  650 mg Per NG tube Q4H PRN    Or    acetaminophen (TYLENOL) suppository 650 mg  650 mg Rectal Q4H PRN    ondansetron (ZOFRAN) injection 4 mg  4 mg IntraVENous Q6H PRN    aspirin chewable tablet 81 mg  81 mg Oral DAILY    docusate sodium (COLACE) capsule 100 mg  100 mg Oral BID    bisacodyl (DULCOLAX) tablet 5 mg  5 mg Oral DAILY PRN    famotidine (PEPCID) tablet 20 mg  20 mg Oral Q12H       Care Plan discussed with: Patient/Family and Nurse    Total time spent with patient: 30 minutes.     Dony Cruz MD

## 2018-02-21 NOTE — PROGRESS NOTES
Bedside shift change report given to Jim Patel (oncoming nurse) by Lajuana Apgar (offgoing nurse). Report included the following information SBAR.

## 2018-02-21 NOTE — PROGRESS NOTES
Problem: Falls - Risk of  Goal: *Absence of Falls  Document Avinash Fall Risk and appropriate interventions in the flowsheet.    Outcome: Progressing Towards Goal  Fall Risk Interventions:  Mobility Interventions: Patient to call before getting OOB, PT Consult for mobility concerns, Assess mobility with egress test         Medication Interventions: Evaluate medications/consider consulting pharmacy, Patient to call before getting OOB, Teach patient to arise slowly    Elimination Interventions: Call light in reach, Patient to call for help with toileting needs, Toileting schedule/hourly rounds, Urinal in reach

## 2018-02-21 NOTE — INTERDISCIPLINARY ROUNDS
IDR/SLIDR Summary          Patient: Gretta Romeo MRN: [id number]    Age: 50 y.o. YOB: 1970 Room/Bed: [facility]   Admit Diagnosis: Left-sided weakness  Hypertensive urgency  Acute CVA (cerebrovascular accident) ([facility] [address]  Hypertensive urgency  Abnormal EKG  Principal Diagnosis: <principal problem not specified>   Goals: BP management, d/c planning, PT/OT  Readmission: NO  Quality Measure: Not applicable  VTE Prophylaxis: Mechanical  Influenza Vaccine screening completed? YES  Pneumococcal Vaccine screening completed? NO  Mobility needs: No   Nutrition plan:Yes  Consults:P.T, O.T. and Case Management    Financial concerns:Yes  Escalated to CM? YES  RRAT Score: 11   Interventions:  Testing due for pt today?  NO  LOS: 8 days Expected length of stay 4 days  Discharge plan: rehab   PCP: None  Transportation needs: No    Days before discharge:two or more days before discharge   [facility]    Signed:     Beto Saldivar  2/21/2018  12:43 AM

## 2018-02-21 NOTE — ROUTINE PROCESS
Bedside and Verbal shift change report given to Apryl Delaney (oncoming nurse) by Bryan Marino (offgoing nurse). Report included the following information SBAR, Kardex, Intake/Output, MAR, Recent Results, Med Rec Status and Cardiac Rhythm NSR.

## 2018-02-21 NOTE — PROGRESS NOTES
1850:  Called [facility] about getting a tele box. Tele tech aware of need, but no box available at the moment. 1950:  Called [facility] for a tele box update. Still no tele box available for the patient. Was told by tech that she would call [facility] to see if they could turn in their tele box ASAP. The tech would call when they have a box to give. Bedside shift change report given to Joylene Holstein (oncoming nurse) by Stephen Jeninngs RN (offgoing nurse). Report included the following information SBAR, Intake/Output and MAR.

## 2018-02-21 NOTE — PROGRESS NOTES
TRANSFER - OUT REPORT:    Verbal report given to Jesse Rees  being transferred to [facility] (unit) for routine progression of care       Report consisted of patients Situation, Background, Assessment and   Recommendations(SBAR). Information from the following report(s) SBAR, Kardex, Recent Results and Cardiac Rhythm NSR c ST depression was reviewed with the receiving nurse. Lines:   Peripheral IV 02/13/18 Right Hand (Active)   Site Assessment Clean, dry, & intact 2/21/2018  4:00 PM   Phlebitis Assessment 0 2/21/2018  4:00 PM   Infiltration Assessment 0 2/21/2018  4:00 PM   Dressing Status Clean, dry, & intact 2/21/2018  4:00 PM   Dressing Type Transparent;Tape 2/21/2018  4:00 PM   Hub Color/Line Status Pink;Capped 2/21/2018  4:00 PM   Action Taken Open ports on tubing capped 2/21/2018  4:00 PM   Alcohol Cap Used Yes 2/21/2018  4:00 PM        Opportunity for questions and clarification was provided.       Patient transported with:   [place]

## 2018-02-21 NOTE — PROGRESS NOTES
CM escalated concerns regarding ability of Home Health to follow patient post discharge to supervisor. CM called and left a voicemail message requesting a call back from Fermin Hawk, [place] with [facility]. Patient now has a follow-up appointment following discharge from Inpatient Rehab for 3/15 with Noman Mariscal MD. CM will continue to follow. BEN Lanier CM spoke with Fermin Hawk with [facility] is still awaiting approval from Financial Team to approve patient for aleyda bed. CM escalated Home Health concerns to supervisors Audrey Caballero and Kindra Ramos- CM will continue to follow. BEN Lanier CM spoke with Fermin Hawk with [address] [place] [address] has approved the patient for aleyda bed covered at 90%, however, patient is responsible for remaining 10%- CARINA and Fermin Hawk spoke with patient, patient is unable to afford the 10% at $1,500.00. CM will contact Care Management supervisors to inquire if a charitable donation may be available to assist patient with deposit. CM will continue to follow.  BEN Lanier

## 2018-02-21 NOTE — PROGRESS NOTES
Problem: Mobility Impaired (Adult and Pediatric)  Goal: *Acute Goals and Plan of Care (Insert Text)  Physical Therapy Goals  Weekly reassessment completed 2/21/18. Goals remain appropriate for carryover. Initiated 2/14/2018  1. Patient will move from supine to sit and sit to supine  in bed with modified independence within 7 day(s). 2.  Patient will transfer from bed to chair and chair to bed with supervision/set-up using the least restrictive device within 7 day(s). 3.  Patient will perform sit to stand with supervision/set-up within 7 day(s). 4.  Patient will ambulate with minimal assistance/contact guard assist for 150 feet with the least restrictive device within 7 day(s). 5.  Patient will ascend/descend 6 stairs with 1 handrail(s) with minimal assistance/contact guard assist within 7 day(s). 6.  Patient will improve Mendiola Balance score by 7 points within 7 days. physical Therapy TREATMENT: WEEKLY REASSESSMENT- neuro population  Patient: Robert Hough (50 y.o. male)  Date: 2/21/2018  Diagnosis: Left-sided weakness  Hypertensive urgency  Acute CVA (cerebrovascular accident) ([address]  Hypertensive urgency  Abnormal EKG <principal problem not specified>       Precautions:    Chart, physical therapy assessment, plan of care and goals were reviewed. ASSESSMENT:  Cleared for mobility progression by RN. Received supine in bed, motivated and agreeable to participation in session. Seen as co-tx with MORAN to maximize functional outcomes and safety. Pt remains limited by decreased L LE strength and NM control leading to impaired gait and standing dynamic balance limiting his ability to complete mobility tasks as per his completely independent baseline.  Emphasis this session on gait training in room/bathroom using Rw with integration of ADL tasks - he continues to require up to min A x2 with manual facilitation of L LE foot placement and quad control to prevent hyperextensive snap back or buckle in stance phase. Able to carryover for 2-3 steps without physical external assist. Requires assist for turn management and to recover from LOBs with fatigue. Remains significantly below his baseline level and a high falls risk with any type of standing dynamic task, likely even a greater risk once in a more open environment. Continue to strongly recommend discharge to acute IP rehab setting prior to returning home. Will follow. Patient's progression toward goals since last assessment: Gradual progress towards all goals. PLAN:  Goals have been updated based on progression since last assessment. Patient continues to benefit from skilled intervention to address the above impairments. Continue to follow the patient 5 times a week to address goals. Planned Interventions:  [x]              Bed Mobility Training             [x]       Neuromuscular Re-Education  [x]              Transfer Training                   []       Orthotic/Prosthetic Training  [x]              Gait Training                         []       Modalities  [x]              Therapeutic Exercises           []       Edema Management/Control  [x]              Therapeutic Activities            [x]       Patient and Family Training/Education  []              Other (comment):  Discharge Recommendations: Inpatient Rehab  Further Equipment Recommendations for Discharge: TBD     SUBJECTIVE:   Patient stated Ah yes I am ready for therapy.     OBJECTIVE DATA SUMMARY:   Critical Behavior:  Neurologic State: Alert  Orientation Level: Oriented X4  Cognition: Appropriate decision making  Safety/Judgement: Awareness of environment    Strength:    L LE < R LE, grossly 3+/4-    Functional Mobility Training:  Bed Mobility:     Supine to Sit: Supervision        Transfers:  Sit to Stand: Contact guard assistance  Stand to Sit: Contact guard assistance        Balance:  Sitting: Impaired  Sitting - Static: Good (unsupported)  Sitting - Dynamic: Fair (occasional)  Standing: Impaired  Standing - Static: Good;Constant support  Standing - Dynamic : Fair (to poor)  Ambulation/Gait Training:  Distance (ft): 30 Feet (ft)  Assistive Device: Gait belt;Walker, rolling  Ambulation - Level of Assistance: Assist x2;Minimal assistance   Gait Abnormalities: Ataxic; Altered arm swing;Decreased step clearance; Foot drop; Hemiplegic;Trunk sway increased  Stance: Left decreased  Speed/Estrellita: Slow  Step Length: Left lengthened  Swing Pattern: Left asymmetrical        Pain:  Pain Scale 1: Numeric (0 - 10)  Pain Intensity 1: 0      Activity Tolerance:   NAD    Please refer to the flowsheet for vital signs taken during this treatment.   After treatment:   [x]  Patient left in no apparent distress sitting up in chair  []  Patient left in no apparent distress in bed  [x]  Call bell left within reach  [x]  Nursing notified  [x]  Friend present  []  Bed alarm activated    COMMUNICATION/COLLABORATION:   The patients plan of care was discussed with: Certified Occupational Therapy Assistant and Registered Nurse    Ra Zambrano PT, DPT   Time Calculation: 17 mins

## 2018-02-21 NOTE — PROGRESS NOTES
Problem: Self Care Deficits Care Plan (Adult)  Goal: *Acute Goals and Plan of Care (Insert Text)  Occupational Therapy Goals  Initiated 2/14/2018   1. Patient will perform lower body dressing with independence within 7 day(s). 2.  Patient will perform bathing with modified independence within 7 day(s). 3.  Patient will perform upper body ADLs standing at sink for 5 minutes without signs of fatigue or LOB within 7 day(s). 4.  Patient will perform toilet transfers with independence within 7 day(s). 5.  Patient will perform all aspects of toileting with independence within 7 day(s). 6.  Patient will participate in upper extremity therapeutic exercise/activities with independence for 10 minutes within 7 day(s). 7.  Patient will utilize energy conservation techniques during functional activities with verbal cues within 7 day(s). MET       Occupational Therapy TREATMENT: WEEKLY REASSESSMENT  Patient: Homer Burrows (50 y.o. male)  Date: 2/21/2018  Diagnosis: Left-sided weakness  Hypertensive urgency  Acute CVA (cerebrovascular accident) ([facility]  Hypertensive urgency  Abnormal EKG <principal problem not specified>       Precautions:    Chart, occupational therapy assessment, plan of care, and goals were reviewed. ASSESSMENT: Seen in co treat with PT to maximize safety and balance. Based on the objective data below, the patient participated in bed mobility, ambulation to bathroom, transfer to toilet, washing hands in standing at sink with walker, and return to chair with supervision to min assist of 2. Assist needed for ambulation and functional transfers with RW for balance and protection of left knee secondary to left knee \"snapping\" back into hyperextension when weight bearing on it as well as impaired dynamic balance secondary to left LE weakness. Patient progressing with all goals. 2/7 goals met and upgraded. Recommend Inpatient rehab.   Progression toward goals:  [x]          Improving appropriately and progressing toward goals  []          Improving slowly and progressing toward goals  []          Not making progress toward goals and plan of care will be adjusted     PLAN:  Goals have been updated based on progression since last assessment. Patient continues to benefit from skilled intervention to address the above impairments. Continue to follow patient 5 times a week to address goals. Planned Interventions:  [x]                  Self Care Training                  [x]           Therapeutic Activities  [x]                  Functional Mobility Training    []           Cognitive Retraining  [x]                  Therapeutic Exercises           [x]           Endurance Activities  [x]                  Balance Training                   [x]           Neuromuscular Re-Education  []                  Visual/Perceptual Training     [x]      Home Safety Training  [x]                  Patient Education                 []           Family Training/Education  []                  Other (comment):  Discharge Recommendations: Inpatient Rehab  Further Equipment Recommendations for Discharge: none     SUBJECTIVE:   Patient stated [place].  when asked to participate    OBJECTIVE DATA SUMMARY:   Cognitive/Behavioral Status:  Neurologic State: Alert  Orientation Level: Oriented X4  Cognition: Appropriate decision making; Appropriate for age attention/concentration; Appropriate safety awareness; Follows commands  Perception: Appears intact  Perseveration: No perseveration noted  Safety/Judgement: Awareness of environment  Functional Mobility and Transfers for ADLs:   Bed Mobility:     Supine to Sit: Supervision          Transfers:  Sit to Stand: Contact guard assistance;Assist x1   Functional Transfers  Toilet Transfer : Minimum assistance;Assist x2  Adaptive Equipment: Grab bars; Walker (comment)    Balance:  Sitting: Intact  Sitting - Static: Good (unsupported)  Sitting - Dynamic: Fair (occasional)  Standing: Impaired  Standing - Static: Fair  Standing - Dynamic : Fair  ADL Intervention:       Grooming  Washing Hands: Contact guard assistance (standing at sink)          Toileting  Toileting Assistance: Minimum assistance  Bladder Hygiene: Supervision/set-up  Bowel Hygiene: Supervision/set-up  Clothing Management: Minimum assistance    Cognitive Retraining  Safety/Judgement: Awareness of environment         Activity Tolerance:    Fair  Please refer to the flowsheet for vital signs taken during this treatment.   After treatment:   [x] Patient left in no apparent distress sitting up in chair  [] Patient left in no apparent distress in bed  [x] Call bell left within reach  [x] Nursing notified  [x] Caregiver present(friend)  [] Bed alarm activated    COMMUNICATION/COLLABORATION:   The patients plan of care was discussed with: Physical Therapist, Occupational Therapist and Registered Nurse    DEAN Cortez  Time Calculation: 16 mins

## 2018-02-21 NOTE — PROGRESS NOTES
Bedside and Verbal shift change report given to Tova Sidhu (oncoming nurse) by Dani Christina (offgoing nurse). Report included the following information SBAR, Kardex, ED Summary, Procedure Summary, Intake/Output, MAR, Recent Results, Med Rec Status and Cardiac Rhythm NSR with ST depression.

## 2018-02-22 LAB
ANION GAP SERPL CALC-SCNC: 8 MMOL/L (ref 5–15)
BUN SERPL-MCNC: 23 MG/DL (ref 6–20)
BUN/CREAT SERPL: 19 (ref 12–20)
CALCIUM SERPL-MCNC: 9.2 MG/DL (ref 8.5–10.1)
CHLORIDE SERPL-SCNC: 99 MMOL/L (ref 97–108)
CO2 SERPL-SCNC: 27 MMOL/L (ref 21–32)
CREAT SERPL-MCNC: 1.22 MG/DL (ref 0.7–1.3)
GLUCOSE SERPL-MCNC: 126 MG/DL (ref 65–100)
MAGNESIUM SERPL-MCNC: 2.2 MG/DL (ref 1.6–2.4)
POTASSIUM SERPL-SCNC: 4 MMOL/L (ref 3.5–5.1)
SODIUM SERPL-SCNC: 134 MMOL/L (ref 136–145)

## 2018-02-22 PROCEDURE — 65660000000 HC RM CCU STEPDOWN

## 2018-02-22 PROCEDURE — 74011250637 HC RX REV CODE- 250/637: Performed by: HOSPITALIST

## 2018-02-22 PROCEDURE — 74011250637 HC RX REV CODE- 250/637: Performed by: NURSE PRACTITIONER

## 2018-02-22 PROCEDURE — 83735 ASSAY OF MAGNESIUM: CPT | Performed by: NURSE PRACTITIONER

## 2018-02-22 PROCEDURE — 36415 COLL VENOUS BLD VENIPUNCTURE: CPT | Performed by: NURSE PRACTITIONER

## 2018-02-22 PROCEDURE — 97116 GAIT TRAINING THERAPY: CPT

## 2018-02-22 PROCEDURE — 97110 THERAPEUTIC EXERCISES: CPT

## 2018-02-22 PROCEDURE — 80048 BASIC METABOLIC PNL TOTAL CA: CPT | Performed by: NURSE PRACTITIONER

## 2018-02-22 RX ORDER — LISINOPRIL 20 MG/1
20 TABLET ORAL DAILY
Status: DISCONTINUED | OUTPATIENT
Start: 2018-02-22 | End: 2018-02-26 | Stop reason: HOSPADM

## 2018-02-22 RX ORDER — HYDRALAZINE HYDROCHLORIDE 20 MG/ML
10 INJECTION INTRAMUSCULAR; INTRAVENOUS
Status: DISCONTINUED | OUTPATIENT
Start: 2018-02-22 | End: 2018-02-26 | Stop reason: HOSPADM

## 2018-02-22 RX ADMIN — CARVEDILOL 25 MG: 12.5 TABLET, FILM COATED ORAL at 09:25

## 2018-02-22 RX ADMIN — ASPIRIN 81 MG 81 MG: 81 TABLET ORAL at 09:25

## 2018-02-22 RX ADMIN — CARVEDILOL 25 MG: 12.5 TABLET, FILM COATED ORAL at 17:27

## 2018-02-22 RX ADMIN — ATORVASTATIN CALCIUM 20 MG: 40 TABLET, FILM COATED ORAL at 21:10

## 2018-02-22 RX ADMIN — DOCUSATE SODIUM 100 MG: 100 CAPSULE, LIQUID FILLED ORAL at 09:25

## 2018-02-22 RX ADMIN — AMLODIPINE BESYLATE 10 MG: 5 TABLET ORAL at 09:24

## 2018-02-22 RX ADMIN — FAMOTIDINE 20 MG: 20 TABLET, FILM COATED ORAL at 09:25

## 2018-02-22 RX ADMIN — FAMOTIDINE 20 MG: 20 TABLET, FILM COATED ORAL at 21:10

## 2018-02-22 RX ADMIN — CHLORTHALIDONE 25 MG: 25 TABLET ORAL at 09:25

## 2018-02-22 RX ADMIN — LISINOPRIL 20 MG: 20 TABLET ORAL at 15:17

## 2018-02-22 RX ADMIN — DOCUSATE SODIUM 100 MG: 100 CAPSULE, LIQUID FILLED ORAL at 17:27

## 2018-02-22 NOTE — PROGRESS NOTES
Bedside shift change report given to Arturo Wallace RN (oncoming nurse) by Irena Verde RN (offgoing nurse). Report included the following information SBAR, Intake/Output and MAR.

## 2018-02-22 NOTE — PROGRESS NOTES
Bedside shift change report given to Alfonso Ralph (oncoming nurse) by Radha Callejas RN (offgoing nurse). Report included the following information SBAR, Kardex, Intake/Output and Recent Results. Per order hx, tele order d/c'd.

## 2018-02-22 NOTE — PROGRESS NOTES
Problem: Mobility Impaired (Adult and Pediatric)  Goal: *Acute Goals and Plan of Care (Insert Text)  Physical Therapy Goals  Weekly reassessment completed 2/21/18. Goals remain appropriate for carryover. Initiated 2/14/2018  1. Patient will move from supine to sit and sit to supine  in bed with modified independence within 7 day(s). 2.  Patient will transfer from bed to chair and chair to bed with supervision/set-up using the least restrictive device within 7 day(s). 3.  Patient will perform sit to stand with supervision/set-up within 7 day(s). 4.  Patient will ambulate with minimal assistance/contact guard assist for 150 feet with the least restrictive device within 7 day(s). 5.  Patient will ascend/descend 6 stairs with 1 handrail(s) with minimal assistance/contact guard assist within 7 day(s). 6.  Patient will improve Mendiola Balance score by 7 points within 7 days. physical Therapy TREATMENT  Patient: Celeste Sow (50 y.o. male)  Date: 2/22/2018  Diagnosis: Left-sided weakness  Hypertensive urgency  Acute CVA (cerebrovascular accident) [facility])  Hypertensive urgency  Abnormal EKG <principal problem not specified>       Precautions:    Chart, physical therapy assessment, plan of care and goals were reviewed. ASSESSMENT:  Pt was motivated for therapy and to ambulate. Initiated gait without A.D. Pt was reaching for objects with the right UE to assist with balance. Increase tone in the left UE during gait. Poor advancement and placement of Left LE. Pt has decrease quad control in stance phase. Pt then ambulated with cane pt was able to increase tiara but not quality. Pt was able to ambulate with Mod A x1. PTA pt was working and independent. Pt is below baseline and could greatly benefit from inpt rehab to continue his progression and independence.     Progression toward goals:  [x]      Improving appropriately and progressing toward goals  []      Improving slowly and progressing toward goals  []      Not making progress toward goals and plan of care will be adjusted     PLAN:  Patient continues to benefit from skilled intervention to address the above impairments. Continue treatment per established plan of care. Discharge Recommendations:  Inpatient Rehab  Further Equipment Recommendations for Discharge:  TBD     SUBJECTIVE:   Patient stated We can try.  when suggested the cane    OBJECTIVE DATA SUMMARY:   Critical Behavior:  Neurologic State: Alert  Orientation Level: Oriented X4  Cognition: Appropriate safety awareness, Follows commands  Safety/Judgement: Awareness of environment  Functional Mobility Training:  Bed Mobility:     Supine to Sit: Supervision               Transfers:  Sit to Stand: Contact guard assistance  Stand to Sit: Minimum assistance (poor sequencing and positioning prior to sitting)                             Balance:  Sitting: Impaired  Sitting - Static: Good (unsupported)  Sitting - Dynamic: Fair (occasional)  Standing: Impaired  Standing - Static: Fair  Standing - Dynamic : Fair  Ambulation/Gait Training:  Distance (ft): 100 Feet (ft) (seated rest break 100 feet with single point cane)  Assistive Device: Gait belt  Ambulation - Level of Assistance: Moderate assistance        Gait Abnormalities: Antalgic; Altered arm swing;Decreased step clearance; Hemiplegic; Path deviations           Stance: Left decreased                         Stairs:             Neuro Re-Education:    Therapeutic Exercises:     Pain:  Pain Scale 1: Numeric (0 - 10)  Pain Intensity 1: 6  Pain Location 1: Leg  Pain Orientation 1: Left     Pain Intervention(s) 1: Repositioned  Activity Tolerance:   Limited   Please refer to the flowsheet for vital signs taken during this treatment.   After treatment:   [x] Patient left in no apparent distress sitting up in chair  [] Patient left in no apparent distress in bed  [x] Call bell left within reach  [x] Nursing notified  [] Caregiver present  [] Bed alarm activated    COMMUNICATION/COLLABORATION:   The patients plan of care was discussed with: Registered Nurse    Donavan Guerrero PTA   Time Calculation: 25 mins

## 2018-02-22 NOTE — PROGRESS NOTES
CM spoke with Clinical Coordinator for [facility] and pt has been accepted for PT after discharge from inpatient rehab at [address] [place] [address].   Malgorzata Joyce RN CRM

## 2018-02-22 NOTE — PROGRESS NOTES
Assumed care of pt this afternoon. Pt is alert and oriented with left side weakness which has improved per report. Pt is now resting in bed with no needs.

## 2018-02-22 NOTE — PROGRESS NOTES
Problem: Self Care Deficits Care Plan (Adult)  Goal: *Acute Goals and Plan of Care (Insert Text)  Occupational Therapy Goals  Initiated 2/14/2018   1. Patient will perform lower body dressing with independence within 7 day(s). 2.  Patient will perform bathing with modified independence within 7 day(s). 3.  Patient will perform upper body ADLs standing at sink for 5 minutes without signs of fatigue or LOB within 7 day(s). 4.  Patient will perform toilet transfers with independence within 7 day(s). 5.  Patient will perform all aspects of toileting with independence within 7 day(s). 6.  Patient will participate in upper extremity therapeutic exercise/activities with independence for 10 minutes within 7 day(s). 7.  Patient will utilize energy conservation techniques during functional activities with verbal cues within 7 day(s). MET        Occupational Therapy TREATMENT: WEEKLY REASSESSMENT  Patient: Becca Rees (50 y.o. male)  Date: 2/22/2018  Diagnosis: Left-sided weakness  Hypertensive urgency  Acute CVA (cerebrovascular accident) ([facility] [address]  Hypertensive urgency  Abnormal EKG <principal problem not specified>       Precautions:    Chart, occupational therapy assessment, plan of care, and goals were reviewed. ASSESSMENT:  Patient received supine in bed, RN reporting he had recently been OOB to chair and returned following PT session. Patient reporting good progression with LUE strength but still experiencing some weakness with  and sensory deficits. Provided education on use of various textures for sensory integration techniques. Patient indicating good understanding. Patient provided with therapeutic sponge and band for upper extremity strengthening. Indicating good understanding. Patient will benefit from inpatient rehab when medically stable. Will tolerate 3 hours/day of therapy.   Progression toward goals:  []            Improving appropriately and progressing toward goals  [] Improving slowly and progressing toward goals  []            Not making progress toward goals and plan of care will be adjusted     PLAN:  Goals have been updated based on progression since last assessment. Patient continues to benefit from skilled intervention to address the above impairments. Continue to follow patient 5 times a week to address goals. Planned Interventions:  [x]                    Self Care Training                  [x]             Therapeutic Activities  [x]                    Functional Mobility Training    []             Cognitive Retraining  [x]                    Therapeutic Exercises           [x]             Endurance Activities  [x]                    Balance Training                   []             Neuromuscular Re-Education  []                    Visual/Perceptual Training     [x]        Home Safety Training  [x]                    Patient Education                 [x]             Family Training/Education  []                    Other (comment):  Discharge Recommendations: Inpatient Rehab  Further Equipment Recommendations for Discharge: TBD by rehab     SUBJECTIVE:   Patient stated It's better to have something than nothing!     OBJECTIVE DATA SUMMARY:   Cognitive/Behavioral Status:  Neurologic State: Alert  Orientation Level: Oriented X4  Cognition: Appropriate decision making; Appropriate for age attention/concentration; Appropriate safety awareness; Follows commands  Perception: Appears intact  Perseveration: No perseveration noted  Safety/Judgement: Awareness of environment    Functional Mobility and Transfers for ADLs:  Bed Mobility:  Supine to Sit: Supervision    Transfers:  Sit to Stand: Contact guard assistance       Balance:  Sitting: Impaired  Sitting - Static: Good (unsupported)  Sitting - Dynamic: Fair (occasional)  Standing: Impaired  Standing - Static: Fair  Standing - Dynamic : Fair    ADL Intervention:  Cognitive Retraining  Safety/Judgement: Awareness of environment    Therapeutic Exercises:     EXERCISE   Sets   Reps   Active Active Assist   Passive   Comments   Shoulder abduction 1 10 [x]           []           []              Shoulder flexion 1 10 [x]           []           []              Tricep extension 1 10 [x]           []           []              * All completed with yellow theraband      Pain:  Pain Scale 1: Numeric (0 - 10)  Pain Intensity 1: 6  Pain Location 1: Leg  Pain Orientation 1: Left     Pain Intervention(s) 1: Repositioned  Activity Tolerance:   Good. Please refer to the flowsheet for vital signs taken during this treatment.   After treatment:   [] Patient left in no apparent distress sitting up in chair  [x] Patient left in no apparent distress in bed  [x] Call bell left within reach  [x] Nursing notified  [] Caregiver present  [] Bed alarm activated    COMMUNICATION/COLLABORATION:   The patients plan of care was discussed with: Physical Therapist and Registered Nurse    Noe Pennington OT  Time Calculation: 9 mins

## 2018-02-22 NOTE — PROGRESS NOTES
Hospitalist Progress Note         Tracy Myers AGACNP  Please call  and page for questions. Call physician on-call through the  7pm-7am    Daily Progress Note: 2/22/2018    Primary care provider:None    Date of admission: 2/13/2018 12:39 PM    Admission summery and hospital course:  Malissa Lee is a 50 y. o. male who presents with PMHx of HTN, noncompliance to medication, unable to say when he last took BP meds, admitted for left sided weakness. Pt states that he noticed it last night involving arm and leg. When asked why he did not seek medical attention last night, he said \"it could wait until morning\".      Subjective:   F/u for resistant Hypertension. He has no new complaints. Pending placement     Assessment/Plan:   Hypertensive Urgency: Secondary to medication non compliance  -BP still not optimally controlled. - Continue Norvasc 10 mg PO daily, Chlorthalidone, coreg. Add Lisinopril  - Goal SBP < 130/80 mm hg  - Continue Hydralazine as necessary for SBP > 160 mm hg   - low salt diet    Acute renal insufficiency:   Probably multifactorial including ACEi and inadequate PO intake. Hold lisinopril and monitor. Resolved.       Left Hemiparesis likely due to Acute CVA  - ASA, add statin  - Continue PT/OT/ST  - Neurology and cardiology input appreciated.    - CTA head/neck- Multifocal areas of presumably remote ischemia in the basal ganglia. - MRI showed acute nonhemorrhagic right deep cerebral white matter infarct/ischemia and extensive chronic ischemic findings.   - Left sided weakness still persist  - continue PT/OT      Abnormal EKG/Elevated troponin:   - CT chest/abd/pel without acute finding. Appreciated cardiology input.   - ECHO with normal EF.          VTE prophylaxis: SCD  Code status: Full  Discussed plan of care with Patient/Family and Nurse. Pre-admission lived at home.    Discharge planning: Pending placement       Review of Systems: Review of Systems:  Symptom  Y/N  Comments   Symptom  Y/N  Comments    Fever/Chills   n   Chest Pain  n    Poor Appetite   n   Edema  n     Cough  n   Abdominal Pain   n    Sputum  n   Joint Pain      SOB/STREET  n   Pruritis/Rash      Nausea/vomit  n   Tolerating PT/OT      Diarrhea  n   Tolerating Diet      Constipation  n   Other      Could not obtain due to:         Objective:   Physical Exam:     Visit Vitals    BP (!) 151/96 (BP 1 Location: Right arm, BP Patient Position: At rest)    Pulse 71    Temp 98.7 °F (37.1 °C)    Resp 18    Ht 5' 11\" (1.803 m)    Wt 95.5 kg (210 lb 9.6 oz)    SpO2 100%    BMI 29.37 kg/m2      O2 Device: Room air    Temp (24hrs), Av.7 °F (37.1 °C), Min:98 °F (36.7 °C), Max:99 °F (37.2 °C)    701 - 1900  In: 480 [P.O.:480]  Out: 300 [Urine:300]   1901 -  0700  In: 250 [P.O.:250]  Out: 625 [Urine:625]     General:  Alert, cooperative, no distress, appears stated age. He is comfortably lying in the bed. Lungs:   Clear to auscultation bilaterally. Chest wall:  No tenderness or deformity. Heart:  Regular rate and rhythm, S1, S2 normal, no murmur. Abdomen:   Soft, non-tender. Bowel sounds normal.    Extremities: Extremities normal, atraumatic, no cyanosis or edema. Pulses: 2+ and symmetric all extremities. Skin: Skin color, texture, turgor normal. No rashes or lesions   Neurologic: LLE strength is 4/5            Data Review:       Recent Days:  Recent Labs      18   023   WBC  9.0   HGB  14.0   HCT  43.1   PLT  275     Recent Labs      18   02318   023   NA  134*  135*   K  4.0  3.9   CL  99  103   CO2  27  22   GLU  126*  110*   BUN  23*  36*   CREA  1.22  1.33*   CA  9.2  8.4*   MG  2.2  2.5*     No results for input(s): PH, PCO2, PO2, HCO3, FIO2 in the last 72 hours.     24 Hour Results:  Recent Results (from the past 24 hour(s))   METABOLIC PANEL, BASIC    Collection Time: 18  2:39 AM   Result Value Ref Range Sodium 134 (L) 136 - 145 mmol/L    Potassium 4.0 3.5 - 5.1 mmol/L    Chloride 99 97 - 108 mmol/L    CO2 27 21 - 32 mmol/L    Anion gap 8 5 - 15 mmol/L    Glucose 126 (H) 65 - 100 mg/dL    BUN 23 (H) 6 - 20 MG/DL    Creatinine 1.22 0.70 - 1.30 MG/DL    BUN/Creatinine ratio 19 12 - 20      GFR est AA >60 >60 ml/min/1.73m2    GFR est non-AA >60 >60 ml/min/1.73m2    Calcium 9.2 8.5 - 10.1 MG/DL   MAGNESIUM    Collection Time: 02/22/18  2:39 AM   Result Value Ref Range    Magnesium 2.2 1.6 - 2.4 mg/dL       Problem List:  Problem List as of 2/22/2018  Never Reviewed          Codes Class Noted - Resolved    Left-sided weakness ICD-10-CM: R53.1  ICD-9-CM: 728.87  2/13/2018 - Present        Hypertensive urgency ICD-10-CM: I16.0  ICD-9-CM: 401.9  2/13/2018 - Present        Abnormal EKG ICD-10-CM: R94.31  ICD-9-CM: 794.31  2/13/2018 - Present        Acute CVA (cerebrovascular accident) [facility]) ICD-10-CM: I63.9  ICD-9-CM: 434.91  2/13/2018 - Present              Medications reviewed  Current Facility-Administered Medications   Medication Dose Route Frequency    hydrALAZINE (APRESOLINE) 20 mg/mL injection 10 mg  10 mg IntraVENous Q6H PRN    carvedilol (COREG) tablet 25 mg  25 mg Oral BID    amLODIPine (NORVASC) tablet 10 mg  10 mg Oral DAILY    chlorthalidone (HYGROTEN) tablet 25 mg  25 mg Oral DAILY    atorvastatin (LIPITOR) tablet 20 mg  20 mg Oral QHS    acetaminophen (TYLENOL) tablet 650 mg  650 mg Oral Q4H PRN    Or    acetaminophen (TYLENOL) solution 650 mg  650 mg Per NG tube Q4H PRN    Or    acetaminophen (TYLENOL) suppository 650 mg  650 mg Rectal Q4H PRN    ondansetron (ZOFRAN) injection 4 mg  4 mg IntraVENous Q6H PRN    aspirin chewable tablet 81 mg  81 mg Oral DAILY    docusate sodium (COLACE) capsule 100 mg  100 mg Oral BID    bisacodyl (DULCOLAX) tablet 5 mg  5 mg Oral DAILY PRN    famotidine (PEPCID) tablet 20 mg  20 mg Oral Q12H       Care Plan discussed with: Patient/Family and Nurse    Total time spent with patient: 30 minutes.     Brittanie Ortiz MD

## 2018-02-23 PROCEDURE — 74011250637 HC RX REV CODE- 250/637: Performed by: HOSPITALIST

## 2018-02-23 PROCEDURE — 74011250637 HC RX REV CODE- 250/637: Performed by: NURSE PRACTITIONER

## 2018-02-23 PROCEDURE — 65660000000 HC RM CCU STEPDOWN

## 2018-02-23 PROCEDURE — 97116 GAIT TRAINING THERAPY: CPT

## 2018-02-23 RX ADMIN — AMLODIPINE BESYLATE 10 MG: 5 TABLET ORAL at 09:52

## 2018-02-23 RX ADMIN — ASPIRIN 81 MG 81 MG: 81 TABLET ORAL at 09:54

## 2018-02-23 RX ADMIN — ATORVASTATIN CALCIUM 20 MG: 40 TABLET, FILM COATED ORAL at 21:45

## 2018-02-23 RX ADMIN — LISINOPRIL 20 MG: 20 TABLET ORAL at 09:49

## 2018-02-23 RX ADMIN — DOCUSATE SODIUM 100 MG: 100 CAPSULE, LIQUID FILLED ORAL at 09:52

## 2018-02-23 RX ADMIN — FAMOTIDINE 20 MG: 20 TABLET, FILM COATED ORAL at 21:45

## 2018-02-23 RX ADMIN — FAMOTIDINE 20 MG: 20 TABLET, FILM COATED ORAL at 09:54

## 2018-02-23 NOTE — PROGRESS NOTES
Reviewed medical chart; note patient's transfer to [facility]. [facility] can accept the patient pending his contribution of $1500 as a down payment with a plan to pay 10% of his overall bill at [address] [place] [address] via a payment plan. The patient is unable to pay the $1500, so this  sent a request to the [facility]; awaiting a response. Colleague, Elif Alegre RN/CRM, coordinated with [facility] to confirm that they can provide PT visits post discharge from [address] [place] [address]. Care Management will continue to follow his disposition.    BEN Wing

## 2018-02-23 NOTE — PROGRESS NOTES
Problem: Mobility Impaired (Adult and Pediatric)  Goal: *Acute Goals and Plan of Care (Insert Text)  Physical Therapy Goals  Weekly reassessment completed 2/21/18. Goals remain appropriate for carryover. Initiated 2/14/2018  1. Patient will move from supine to sit and sit to supine  in bed with modified independence within 7 day(s). 2.  Patient will transfer from bed to chair and chair to bed with supervision/set-up using the least restrictive device within 7 day(s). 3.  Patient will perform sit to stand with supervision/set-up within 7 day(s). 4.  Patient will ambulate with minimal assistance/contact guard assist for 150 feet with the least restrictive device within 7 day(s). 5.  Patient will ascend/descend 6 stairs with 1 handrail(s) with minimal assistance/contact guard assist within 7 day(s). 6.  Patient will improve Mendiola Balance score by 7 points within 7 days. physical Therapy TREATMENT  Patient: Hair Valentino (50 y.o. male)  Date: 2/23/2018  Diagnosis: Left-sided weakness  Hypertensive urgency  Acute CVA (cerebrovascular accident) [facility])  Hypertensive urgency  Abnormal EKG <principal problem not specified>       Precautions:    Chart, physical therapy assessment, plan of care and goals were reviewed. ASSESSMENT:  Pt eager to mobilize. Pt is maintaining gait tolerance but fatigues post second trial. Decrease tiara today compared to yesterday. Decrease left foot clearance with swing phase and placement. Narrow LANDY to scissoring. Decreased hyperextention with stance phase. Pt has decrease static standing maintaining right Weightshift. Pt could benefit from inpt rehab to progress mobility and safety.    Progression toward goals:  [x]      Improving appropriately and progressing toward goals  []      Improving slowly and progressing toward goals  []      Not making progress toward goals and plan of care will be adjusted     PLAN:  Patient continues to benefit from skilled intervention to address the above impairments. Continue treatment per established plan of care. Discharge Recommendations:  inpt Rehab  Further Equipment Recommendations for Discharge:  TBD     SUBJECTIVE:   Patient stated what now.     OBJECTIVE DATA SUMMARY:   Critical Behavior:  Neurologic State: Alert  Orientation Level: Oriented X4  Cognition: Appropriate safety awareness, Follows commands  Safety/Judgement: Awareness of environment  Functional Mobility Training:  Bed Mobility:     Supine to Sit: Supervision  Sit to Supine: Supervision            Transfers:  Sit to Stand: Contact guard assistance  Stand to Sit: Minimum assistance (decrease control)        Bed to Chair: Minimum assistance                    Balance:  Sitting: Intact  Standing: Impaired  Standing - Static: Fair  Standing - Dynamic : Poor  Ambulation/Gait Training:  Distance (ft): 100 Feet (ft) 2 trials  Assistive Device: Cane, straight;Gait belt  Ambulation - Level of Assistance: Moderate assistance        Gait Abnormalities: Decreased step clearance; Hemiplegic; Path deviations;Trunk sway increased           Stance: Left decreased     Step Length: Left shortened;Right shortened  Swing Pattern: Left asymmetrical                Stairs:             Neuro Re-Education:    Therapeutic Exercises:     Pain:  Pain Scale 1: Numeric (0 - 10)  Pain Intensity 1: 0              Activity Tolerance:   Limited   Please refer to the flowsheet for vital signs taken during this treatment.   After treatment:   [x] Patient left in no apparent distress sitting up in chair  [] Patient left in no apparent distress in bed  [x] Call bell left within reach  [x] Nursing notified  [] Caregiver present  [] Bed alarm activated    COMMUNICATION/COLLABORATION:   The patients plan of care was discussed with: Registered Nurse    Juanito Ferreira PTA   Time Calculation: 24 mins

## 2018-02-23 NOTE — PROGRESS NOTES
As noted yesterday, [facility] can accept the patient pending his contribution of $1500 as a down payment with a plan to pay 10% of his overall bill at [address] [place] [address] via a payment plan. The patient is unable to pay the $1500, so this  sent a request to the [facility] yesterday.     Colleague, Sabine Tiwari, RN/CRM, coordinated with [facility] to confirm that they can provide PT visits post discharge from [address] [place] [address]. Current Discharge Plan:  [facility] pending approval of $1500 from the [facility]. Still awaiting approval from [facility]. Left voicemail messages on the mobile phone and work phone of the . Upon receiving no response, visited the volunteer services office and learned that the coordinator has been out of the office since yesterday afternoon. Milligan that the only person who can provide approval/denial is the . [facility] leadership has asked for a copy of this approval if obtained. Care Management will continue to follow his disposition.    Terrance Condon, MSW

## 2018-02-23 NOTE — PROGRESS NOTES
Call placed to NP on call to inquire if ok to give a.m. BP meds (Hygroten, Amlodipine, Coreg, Lisinopril). BP stable at this time but significantly lower in last 12hrs compared to yesterday. Most recent vital signs below:    /75 (BP 1 Location: Left arm, BP Patient Position: At rest)  Pulse 66  Temp 98.6 °F (37 °C)  Resp 16  Ht 5' 11\" (1.803 m)  Wt 96.5 kg (212 lb 12.8 oz)  SpO2 97%  BMI 29.68 kg/m2      Per Lata David NP,  Hold Coreg and Hygroten; and give Lisinopril and Norvasc. Will proceed as ordered and continue to monitor.

## 2018-02-23 NOTE — PROGRESS NOTES
Bedside shift change report given to Ander Hatchet, RN (oncoming nurse) by Joanie Garcia RN (offgoing nurse). Report included the following information SBAR, Intake/Output and MAR.

## 2018-02-23 NOTE — PROGRESS NOTES
Problem: Falls - Risk of  Goal: *Absence of Falls  Document Avinash Fall Risk and appropriate interventions in the flowsheet.    Outcome: Progressing Towards Goal  Fall Risk Interventions:  Mobility Interventions: Patient to call before getting OOB         Medication Interventions: Patient to call before getting OOB    Elimination Interventions: Call light in reach

## 2018-02-23 NOTE — ROUTINE PROCESS
Bedside and Verbal shift change report given to Lise Sinha RN (oncoming nurse) by Karen Allred RN (offgoing nurse). Report included the following information SBAR, Intake/Output, MAR and Recent Results.

## 2018-02-24 LAB
ANION GAP SERPL CALC-SCNC: 7 MMOL/L (ref 5–15)
BUN SERPL-MCNC: 35 MG/DL (ref 6–20)
BUN/CREAT SERPL: 24 (ref 12–20)
CALCIUM SERPL-MCNC: 9.1 MG/DL (ref 8.5–10.1)
CHLORIDE SERPL-SCNC: 102 MMOL/L (ref 97–108)
CO2 SERPL-SCNC: 25 MMOL/L (ref 21–32)
CREAT SERPL-MCNC: 1.48 MG/DL (ref 0.7–1.3)
GLUCOSE SERPL-MCNC: 122 MG/DL (ref 65–100)
MAGNESIUM SERPL-MCNC: 2.4 MG/DL (ref 1.6–2.4)
POTASSIUM SERPL-SCNC: 4.1 MMOL/L (ref 3.5–5.1)
SODIUM SERPL-SCNC: 134 MMOL/L (ref 136–145)

## 2018-02-24 PROCEDURE — 74011250637 HC RX REV CODE- 250/637: Performed by: NURSE PRACTITIONER

## 2018-02-24 PROCEDURE — 80048 BASIC METABOLIC PNL TOTAL CA: CPT | Performed by: NURSE PRACTITIONER

## 2018-02-24 PROCEDURE — 74011250637 HC RX REV CODE- 250/637: Performed by: HOSPITALIST

## 2018-02-24 PROCEDURE — 36415 COLL VENOUS BLD VENIPUNCTURE: CPT | Performed by: NURSE PRACTITIONER

## 2018-02-24 PROCEDURE — 83735 ASSAY OF MAGNESIUM: CPT | Performed by: NURSE PRACTITIONER

## 2018-02-24 PROCEDURE — 65660000000 HC RM CCU STEPDOWN

## 2018-02-24 RX ORDER — CARVEDILOL 12.5 MG/1
12.5 TABLET ORAL 2 TIMES DAILY
Status: DISCONTINUED | OUTPATIENT
Start: 2018-02-24 | End: 2018-02-26 | Stop reason: HOSPADM

## 2018-02-24 RX ORDER — SODIUM CHLORIDE 0.9 % (FLUSH) 0.9 %
5-10 SYRINGE (ML) INJECTION AS NEEDED
Status: DISCONTINUED | OUTPATIENT
Start: 2018-02-24 | End: 2018-02-26 | Stop reason: HOSPADM

## 2018-02-24 RX ADMIN — FAMOTIDINE 20 MG: 20 TABLET, FILM COATED ORAL at 08:46

## 2018-02-24 RX ADMIN — DOCUSATE SODIUM 100 MG: 100 CAPSULE, LIQUID FILLED ORAL at 17:25

## 2018-02-24 RX ADMIN — Medication 10 ML: at 21:45

## 2018-02-24 RX ADMIN — ATORVASTATIN CALCIUM 20 MG: 40 TABLET, FILM COATED ORAL at 21:45

## 2018-02-24 RX ADMIN — LISINOPRIL 20 MG: 20 TABLET ORAL at 08:45

## 2018-02-24 RX ADMIN — FAMOTIDINE 20 MG: 20 TABLET, FILM COATED ORAL at 21:45

## 2018-02-24 RX ADMIN — CARVEDILOL 12.5 MG: 12.5 TABLET, FILM COATED ORAL at 08:46

## 2018-02-24 RX ADMIN — DOCUSATE SODIUM 100 MG: 100 CAPSULE, LIQUID FILLED ORAL at 08:46

## 2018-02-24 RX ADMIN — CARVEDILOL 12.5 MG: 12.5 TABLET, FILM COATED ORAL at 17:25

## 2018-02-24 RX ADMIN — ASPIRIN 81 MG 81 MG: 81 TABLET ORAL at 08:45

## 2018-02-24 RX ADMIN — AMLODIPINE BESYLATE 10 MG: 5 TABLET ORAL at 08:45

## 2018-02-24 NOTE — PROGRESS NOTES
Patient denied any pain overnight, voiding well and BUE strength equal, LLE weaker than RLE. Bedside shift change report given to Zeenat Mason (oncoming nurse) by Elsi Gorman RN (offgoing nurse). Report included the following information SBAR, Kardex, Intake/Output, MAR, Accordion and Recent Results.

## 2018-02-24 NOTE — PROGRESS NOTES
Hospitalist Progress Note         TESSY Perkins  Please call  and page for questions. Call physician on-call through the  7pm-7am    Daily Progress Note: 2/24/2018    Primary care provider:None    Date of admission: 2/13/2018 12:39 PM    Admission summery and hospital course:  Sina Lee is a 50 y. o. male who presents with PMHx of HTN, noncompliance to medication, unable to say when he last took BP meds, admitted for left sided weakness. Pt states that he noticed it last night involving arm and leg. When asked why he did not seek medical attention last night, he said \"it could wait until morning\".      Subjective:     No changes today. Patient continues to await placement. Encouraged him to get OOB today. Assessment/Plan:   Hypertensive Urgency: Secondary to medication non compliance  -BP still not optimally controlled. - Continue Norvasc 10 mg PO daily, Chlorthalidone, coreg. Add Lisinopril  - Goal SBP < 130/80 mm hg  - Continue Hydralazine as necessary for SBP > 160 mm hg   - low salt diet    Acute renal insufficiency: resolved  Probably multifactorial including ACEi and inadequate PO intake. Stop lisinopril   Resolved.       Left Hemiparesis likely due to Acute CVA  - ASA, add statin  - Continue PT/OT/ST  - Neurology and cardiology input appreciated.    - CTA head/neck- Multifocal areas of presumably remote ischemia in the basal ganglia. - MRI showed acute nonhemorrhagic right deep cerebral white matter infarct/ischemia and extensive chronic ischemic findings.   - Left sided weakness still persist  - continue PT/OT      Abnormal EKG/Elevated troponin:   - CT chest/abd/pel without acute finding. Appreciated cardiology input.   - ECHO with normal EF.          VTE prophylaxis: SCD  Code status: Full  Discussed plan of care with Patient/Family and Nurse. Pre-admission lived at home.    Discharge planning: Pending placement       Review of Systems:     Review of Systems:  Symptom  Y/N  Comments   Symptom  Y/N  Comments    Fever/Chills   n   Chest Pain  n    Poor Appetite   n   Edema  n     Cough  n   Abdominal Pain   n    Sputum  n   Joint Pain      SOB/STREET  n   Pruritis/Rash      Nausea/vomit  n   Tolerating PT/OT      Diarrhea  n   Tolerating Diet      Constipation  n   Other      Could not obtain due to:         Objective:   Physical Exam:     Visit Vitals    /75    Pulse 75    Temp 98.7 °F (37.1 °C)    Resp 18    Ht 5' 11\" (1.803 m)    Wt 96.5 kg (212 lb 12.8 oz)    SpO2 98%    BMI 29.68 kg/m2      O2 Device: Room air    Temp (24hrs), Av.4 °F (36.9 °C), Min:97.8 °F (36.6 °C), Max:98.7 °F (37.1 °C)    701 -  1900  In: 480 [P.O.:480]  Out: 500 [Urine:500]   1901 -  0700  In: 440 [P.O.:440]  Out: 850 [Urine:850]     General:  Alert, cooperative, no distress, appears stated age. He is comfortably lying in the bed. Lungs:   Clear to auscultation bilaterally. Chest wall:  No tenderness or deformity. Heart:  Regular rate and rhythm, S1, S2 normal, no murmur. Abdomen:   Soft, non-tender. Bowel sounds normal.    Extremities: Extremities normal, atraumatic, no cyanosis or edema. Pulses: 2+ and symmetric all extremities. Skin: Skin color, texture, turgor normal. No rashes or lesions   Neurologic: LLE strength is 4/5            Data Review:       Recent Days:  No results for input(s): WBC, HGB, HCT, PLT, HGBEXT, HCTEXT, PLTEXT, HGBEXT, HCTEXT, PLTEXT in the last 72 hours. Recent Labs      18   0331  18   0239   NA  134*  134*   K  4.1  4.0   CL  102  99   CO2  25  27   GLU  122*  126*   BUN  35*  23*   CREA  1.48*  1.22   CA  9.1  9.2   MG  2.4  2.2     No results for input(s): PH, PCO2, PO2, HCO3, FIO2 in the last 72 hours.     24 Hour Results:  Recent Results (from the past 24 hour(s))   METABOLIC PANEL, BASIC    Collection Time: 18  3:31 AM   Result Value Ref Range    Sodium 134 (L) 136 - 145 mmol/L    Potassium 4.1 3.5 - 5.1 mmol/L    Chloride 102 97 - 108 mmol/L    CO2 25 21 - 32 mmol/L    Anion gap 7 5 - 15 mmol/L    Glucose 122 (H) 65 - 100 mg/dL    BUN 35 (H) 6 - 20 MG/DL    Creatinine 1.48 (H) 0.70 - 1.30 MG/DL    BUN/Creatinine ratio 24 (H) 12 - 20      GFR est AA >60 >60 ml/min/1.73m2    GFR est non-AA 51 (L) >60 ml/min/1.73m2    Calcium 9.1 8.5 - 10.1 MG/DL   MAGNESIUM    Collection Time: 02/24/18  3:31 AM   Result Value Ref Range    Magnesium 2.4 1.6 - 2.4 mg/dL       Problem List:  Problem List as of 2/24/2018  Never Reviewed          Codes Class Noted - Resolved    Left-sided weakness ICD-10-CM: R53.1  ICD-9-CM: 728.87  2/13/2018 - Present        Hypertensive urgency ICD-10-CM: I16.0  ICD-9-CM: 401.9  2/13/2018 - Present        Abnormal EKG ICD-10-CM: R94.31  ICD-9-CM: 794.31  2/13/2018 - Present        Acute CVA (cerebrovascular accident) [facility]) ICD-10-CM: I63.9  ICD-9-CM: 434.91  2/13/2018 - Present              Medications reviewed  Current Facility-Administered Medications   Medication Dose Route Frequency    carvedilol (COREG) tablet 12.5 mg  12.5 mg Oral BID    hydrALAZINE (APRESOLINE) 20 mg/mL injection 10 mg  10 mg IntraVENous Q6H PRN    lisinopril (PRINIVIL, ZESTRIL) tablet 20 mg  20 mg Oral DAILY    amLODIPine (NORVASC) tablet 10 mg  10 mg Oral DAILY    atorvastatin (LIPITOR) tablet 20 mg  20 mg Oral QHS    acetaminophen (TYLENOL) tablet 650 mg  650 mg Oral Q4H PRN    Or    acetaminophen (TYLENOL) solution 650 mg  650 mg Per NG tube Q4H PRN    Or    acetaminophen (TYLENOL) suppository 650 mg  650 mg Rectal Q4H PRN    ondansetron (ZOFRAN) injection 4 mg  4 mg IntraVENous Q6H PRN    aspirin chewable tablet 81 mg  81 mg Oral DAILY    docusate sodium (COLACE) capsule 100 mg  100 mg Oral BID    bisacodyl (DULCOLAX) tablet 5 mg  5 mg Oral DAILY PRN    famotidine (PEPCID) tablet 20 mg  20 mg Oral Q12H       Care Plan discussed with: Patient/Family and Nurse    Total time spent with patient: 30 minutes.     Abbey Krishnamurthy, NP

## 2018-02-24 NOTE — PROGRESS NOTES
Encouraged patient to ambulate in hallway, but patient declined. States he may be willing later today. Bedside shift change report given to Sarai Weaver RN (oncoming nurse) by Lito Whitlock RN (offgoing nurse). Report included the following information SBAR, Intake/Output and MAR.

## 2018-02-25 PROCEDURE — 74011250637 HC RX REV CODE- 250/637: Performed by: HOSPITALIST

## 2018-02-25 PROCEDURE — 65660000000 HC RM CCU STEPDOWN

## 2018-02-25 PROCEDURE — 74011250637 HC RX REV CODE- 250/637: Performed by: NURSE PRACTITIONER

## 2018-02-25 RX ADMIN — AMLODIPINE BESYLATE 10 MG: 5 TABLET ORAL at 08:13

## 2018-02-25 RX ADMIN — LISINOPRIL 20 MG: 20 TABLET ORAL at 08:14

## 2018-02-25 RX ADMIN — ATORVASTATIN CALCIUM 20 MG: 40 TABLET, FILM COATED ORAL at 21:32

## 2018-02-25 RX ADMIN — FAMOTIDINE 20 MG: 20 TABLET, FILM COATED ORAL at 08:13

## 2018-02-25 RX ADMIN — CARVEDILOL 12.5 MG: 12.5 TABLET, FILM COATED ORAL at 08:14

## 2018-02-25 RX ADMIN — CARVEDILOL 12.5 MG: 12.5 TABLET, FILM COATED ORAL at 17:11

## 2018-02-25 RX ADMIN — ASPIRIN 81 MG 81 MG: 81 TABLET ORAL at 08:14

## 2018-02-25 RX ADMIN — DOCUSATE SODIUM 100 MG: 100 CAPSULE, LIQUID FILLED ORAL at 08:14

## 2018-02-25 RX ADMIN — FAMOTIDINE 20 MG: 20 TABLET, FILM COATED ORAL at 21:32

## 2018-02-25 NOTE — PROGRESS NOTES
Bedside shift change report given to Deborah Lopes RN (oncoming nurse) by Marina Cohn RN (offgoing nurse). Report included the following information SBAR, Intake/Output and MAR.

## 2018-02-25 NOTE — PROGRESS NOTES
Hospitalist Progress Note         Yuri Valentine AGACNP  Please call  and page for questions. Call physician on-call through the  7pm-7am    Daily Progress Note: 2/25/2018    Primary care provider:None    Date of admission: 2/13/2018 12:39 PM    Admission summery and hospital course:  Lucero Lee is a 50 y. o. male who presents with PMHx of HTN, noncompliance to medication, unable to say when he last took BP meds, admitted for left sided weakness. Pt states that he noticed it last night involving arm and leg. When asked why he did not seek medical attention last night, he said \"it could wait until morning\".      Subjective:     No changes today. Patient continues to await placement. Encouraged him to get OOB today. He did walk in hallway with nursing. Continued with unsteady gait. Assessment/Plan:   Hypertensive Urgency: Secondary to medication non compliance  - BP has improved , even on the lower side at times. - Continue Norvasc 10 mg PO daily, coreg 12.5 mg BID. Add Lisinopril 20 mg   - Goal SBP < 130/80 mm hg  - Continue Hydralazine as necessary for SBP > 160 mm hg   - low salt diet    Acute renal insufficiency: resolved  Probably multifactorial including ACEi and inadequate PO intake. Stop lisinopril   Resolved.       Left Hemiparesis likely due to Acute CVA  - ASA, add statin  - Continue PT/OT/ST  - Neurology and cardiology input appreciated.    - CTA head/neck- Multifocal areas of presumably remote ischemia in the basal ganglia. - MRI showed acute nonhemorrhagic right deep cerebral white matter infarct/ischemia and extensive chronic ischemic findings.   - Left sided weakness still persist  - continue PT/OT      Abnormal EKG/Elevated troponin:   - CT chest/abd/pel without acute finding. Appreciated cardiology input.   - ECHO with normal EF.           VTE prophylaxis: SCD  Code status: Full  Discussed plan of care with Patient/Family and Nurse.   Pre-admission lived at home. Discharge planning: Pending placement       Review of Systems:     Review of Systems:  Symptom  Y/N  Comments   Symptom  Y/N  Comments    Fever/Chills   n   Chest Pain  n    Poor Appetite   n   Edema  n     Cough  n   Abdominal Pain   n    Sputum  n   Joint Pain      SOB/STREET  n   Pruritis/Rash      Nausea/vomit  n   Tolerating PT/OT      Diarrhea  n   Tolerating Diet      Constipation  n   Other      Could not obtain due to:         Objective:   Physical Exam:     Visit Vitals    /79 (BP 1 Location: Right arm, BP Patient Position: At rest)    Pulse 74    Temp 98.7 °F (37.1 °C)    Resp 18    Ht 5' 11\" (1.803 m)    Wt 96.5 kg (212 lb 12.8 oz)    SpO2 98%    BMI 29.68 kg/m2      O2 Device: Room air    Temp (24hrs), Av.6 °F (37 °C), Min:98.3 °F (36.8 °C), Max:99 °F (37.2 °C)    701 -  1900  In: 480 [P.O.:480]  Out: 700 [Urine:700]   1901 -  0700  In: 480 [P.O.:480]  Out: 1600 [Urine:1600]     General:  Alert, cooperative, no distress, appears stated age. He is comfortably lying in the bed. Lungs:   Clear to auscultation bilaterally. Chest wall:  No tenderness or deformity. Heart:  Regular rate and rhythm, S1, S2 normal, no murmur. Abdomen:   Soft, non-tender. Bowel sounds normal.    Extremities: Extremities normal, atraumatic, no cyanosis or edema. Pulses: 2+ and symmetric all extremities. Skin: Skin color, texture, turgor normal. No rashes or lesions   Neurologic: LLE strength is 4/5            Data Review:       Recent Days:  No results for input(s): WBC, HGB, HCT, PLT, HGBEXT, HCTEXT, PLTEXT, HGBEXT, HCTEXT, PLTEXT in the last 72 hours. Recent Labs      18   0331   NA  134*   K  4.1   CL  102   CO2  25   GLU  122*   BUN  35*   CREA  1.48*   CA  9.1   MG  2.4     No results for input(s): PH, PCO2, PO2, HCO3, FIO2 in the last 72 hours.     24 Hour Results:  No results found for this or any previous visit (from the past 24 hour(s)). Problem List:  Problem List as of 2/25/2018  Never Reviewed          Codes Class Noted - Resolved    Left-sided weakness ICD-10-CM: R53.1  ICD-9-CM: 728.87  2/13/2018 - Present        Hypertensive urgency ICD-10-CM: I16.0  ICD-9-CM: 401.9  2/13/2018 - Present        Abnormal EKG ICD-10-CM: R94.31  ICD-9-CM: 794.31  2/13/2018 - Present        Acute CVA (cerebrovascular accident) [facility]) ICD-10-CM: I63.9  ICD-9-CM: 434.91  2/13/2018 - Present              Medications reviewed  Current Facility-Administered Medications   Medication Dose Route Frequency    carvedilol (COREG) tablet 12.5 mg  12.5 mg Oral BID    sodium chloride (NS) flush 5-10 mL  5-10 mL IntraVENous PRN    hydrALAZINE (APRESOLINE) 20 mg/mL injection 10 mg  10 mg IntraVENous Q6H PRN    lisinopril (PRINIVIL, ZESTRIL) tablet 20 mg  20 mg Oral DAILY    amLODIPine (NORVASC) tablet 10 mg  10 mg Oral DAILY    atorvastatin (LIPITOR) tablet 20 mg  20 mg Oral QHS    acetaminophen (TYLENOL) tablet 650 mg  650 mg Oral Q4H PRN    Or    acetaminophen (TYLENOL) solution 650 mg  650 mg Per NG tube Q4H PRN    Or    acetaminophen (TYLENOL) suppository 650 mg  650 mg Rectal Q4H PRN    ondansetron (ZOFRAN) injection 4 mg  4 mg IntraVENous Q6H PRN    aspirin chewable tablet 81 mg  81 mg Oral DAILY    docusate sodium (COLACE) capsule 100 mg  100 mg Oral BID    bisacodyl (DULCOLAX) tablet 5 mg  5 mg Oral DAILY PRN    famotidine (PEPCID) tablet 20 mg  20 mg Oral Q12H       Care Plan discussed with: Patient/Family and Nurse    Total time spent with patient: 30 minutes.     Jazlyn Stevens NP

## 2018-02-25 NOTE — PROGRESS NOTES
Patient denied any pain overnight. Bedside shift change report given to Zeenat Mason (oncoming nurse) by Luis Alfredo Douglas RN (offgoing nurse). Report included the following information SBAR, Kardex, Intake/Output, MAR, Accordion and Recent Results.

## 2018-02-25 NOTE — PROGRESS NOTES
Patient ambulated in hallway to [address] with hand-hold assist by RN and cane. Fairly tolerated due to poor balance. Patient was returned to bed after ambulating.

## 2018-02-26 ENCOUNTER — HOSPITAL ENCOUNTER (OUTPATIENT)
Age: 48
Discharge: HOME | End: 2018-03-10
Attending: PHYSICAL MEDICINE & REHABILITATION | Admitting: PHYSICAL MEDICINE & REHABILITATION

## 2018-02-26 VITALS
OXYGEN SATURATION: 98 % | HEIGHT: 71 IN | DIASTOLIC BLOOD PRESSURE: 89 MMHG | SYSTOLIC BLOOD PRESSURE: 144 MMHG | RESPIRATION RATE: 18 BRPM | WEIGHT: 212.8 LBS | BODY MASS INDEX: 29.79 KG/M2 | HEART RATE: 98 BPM | TEMPERATURE: 98.7 F

## 2018-02-26 PROBLEM — R94.31 ABNORMAL EKG: Status: RESOLVED | Noted: 2018-02-13 | Resolved: 2018-02-26

## 2018-02-26 PROBLEM — I16.0 HYPERTENSIVE URGENCY: Status: RESOLVED | Noted: 2018-02-13 | Resolved: 2018-02-26

## 2018-02-26 LAB
ANION GAP SERPL CALC-SCNC: 8 MMOL/L (ref 5–15)
APPEARANCE UR: CLEAR
BACTERIA URNS QL MICRO: NEGATIVE /HPF
BILIRUB UR QL: NEGATIVE
BUN SERPL-MCNC: 33 MG/DL (ref 6–20)
BUN/CREAT SERPL: 26 (ref 12–20)
CALCIUM SERPL-MCNC: 9.3 MG/DL (ref 8.5–10.1)
CHLORIDE SERPL-SCNC: 101 MMOL/L (ref 97–108)
CO2 SERPL-SCNC: 26 MMOL/L (ref 21–32)
COLOR UR: NORMAL
CREAT SERPL-MCNC: 1.25 MG/DL (ref 0.7–1.3)
EPITH CASTS URNS QL MICRO: NORMAL /LPF
GLUCOSE SERPL-MCNC: 124 MG/DL (ref 65–100)
GLUCOSE UR STRIP.AUTO-MCNC: NEGATIVE MG/DL
HGB UR QL STRIP: NEGATIVE
HYALINE CASTS URNS QL MICRO: NORMAL /LPF (ref 0–5)
KETONES UR QL STRIP.AUTO: NEGATIVE MG/DL
LEUKOCYTE ESTERASE UR QL STRIP.AUTO: NEGATIVE
MAGNESIUM SERPL-MCNC: 2.3 MG/DL (ref 1.6–2.4)
NITRITE UR QL STRIP.AUTO: NEGATIVE
PH UR STRIP: 7 [PH] (ref 5–8)
POTASSIUM SERPL-SCNC: 4 MMOL/L (ref 3.5–5.1)
PROT UR STRIP-MCNC: NEGATIVE MG/DL
RBC #/AREA URNS HPF: NORMAL /HPF (ref 0–5)
SODIUM SERPL-SCNC: 135 MMOL/L (ref 136–145)
SP GR UR REFRACTOMETRY: 1 (ref 1–1.03)
UROBILINOGEN UR QL STRIP.AUTO: 0.2 EU/DL (ref 0.2–1)
WBC URNS QL MICRO: NORMAL /HPF (ref 0–4)

## 2018-02-26 PROCEDURE — 83735 ASSAY OF MAGNESIUM: CPT | Performed by: NURSE PRACTITIONER

## 2018-02-26 PROCEDURE — 74011250637 HC RX REV CODE- 250/637: Performed by: PHYSICAL MEDICINE & REHABILITATION

## 2018-02-26 PROCEDURE — 74011250637 HC RX REV CODE- 250/637: Performed by: HOSPITALIST

## 2018-02-26 PROCEDURE — 80048 BASIC METABOLIC PNL TOTAL CA: CPT | Performed by: NURSE PRACTITIONER

## 2018-02-26 PROCEDURE — 74011250637 HC RX REV CODE- 250/637: Performed by: NURSE PRACTITIONER

## 2018-02-26 PROCEDURE — 87086 URINE CULTURE/COLONY COUNT: CPT | Performed by: PHYSICAL MEDICINE & REHABILITATION

## 2018-02-26 PROCEDURE — 97116 GAIT TRAINING THERAPY: CPT

## 2018-02-26 PROCEDURE — 81001 URINALYSIS AUTO W/SCOPE: CPT | Performed by: PHYSICAL MEDICINE & REHABILITATION

## 2018-02-26 PROCEDURE — 36415 COLL VENOUS BLD VENIPUNCTURE: CPT | Performed by: NURSE PRACTITIONER

## 2018-02-26 RX ORDER — ADHESIVE BANDAGE
30 BANDAGE TOPICAL DAILY PRN
Status: DISCONTINUED | OUTPATIENT
Start: 2018-02-26 | End: 2018-03-10 | Stop reason: HOSPADM

## 2018-02-26 RX ORDER — CARVEDILOL 12.5 MG/1
12.5 TABLET ORAL 2 TIMES DAILY
Status: DISCONTINUED | OUTPATIENT
Start: 2018-02-26 | End: 2018-03-10 | Stop reason: HOSPADM

## 2018-02-26 RX ORDER — ATORVASTATIN CALCIUM 20 MG/1
20 TABLET, FILM COATED ORAL
Status: DISCONTINUED | OUTPATIENT
Start: 2018-02-26 | End: 2018-03-06

## 2018-02-26 RX ORDER — AMLODIPINE BESYLATE 5 MG/1
10 TABLET ORAL DAILY
Status: DISCONTINUED | OUTPATIENT
Start: 2018-02-27 | End: 2018-03-10 | Stop reason: HOSPADM

## 2018-02-26 RX ORDER — FACIAL-BODY WIPES
10 EACH TOPICAL DAILY PRN
Status: DISCONTINUED | OUTPATIENT
Start: 2018-02-26 | End: 2018-03-10 | Stop reason: HOSPADM

## 2018-02-26 RX ORDER — ACETAMINOPHEN 325 MG/1
650 TABLET ORAL
Status: DISCONTINUED | OUTPATIENT
Start: 2018-02-26 | End: 2018-03-10 | Stop reason: HOSPADM

## 2018-02-26 RX ORDER — NITROGLYCERIN 0.4 MG/1
0.4 TABLET SUBLINGUAL
Status: DISCONTINUED | OUTPATIENT
Start: 2018-02-26 | End: 2018-03-10 | Stop reason: HOSPADM

## 2018-02-26 RX ORDER — AMLODIPINE BESYLATE 10 MG/1
10 TABLET ORAL DAILY
Qty: 30 TAB | Refills: 0 | Status: SHIPPED
Start: 2018-02-27

## 2018-02-26 RX ORDER — CARVEDILOL 12.5 MG/1
12.5 TABLET ORAL 2 TIMES DAILY
Qty: 60 TAB | Refills: 0 | Status: SHIPPED
Start: 2018-02-26

## 2018-02-26 RX ORDER — ENOXAPARIN SODIUM 100 MG/ML
40 INJECTION SUBCUTANEOUS DAILY
Status: DISCONTINUED | OUTPATIENT
Start: 2018-02-27 | End: 2018-03-07

## 2018-02-26 RX ORDER — GUAIFENESIN 100 MG/5ML
81 LIQUID (ML) ORAL DAILY
Status: DISCONTINUED | OUTPATIENT
Start: 2018-02-27 | End: 2018-03-10 | Stop reason: HOSPADM

## 2018-02-26 RX ORDER — GUAIFENESIN 100 MG/5ML
81 LIQUID (ML) ORAL DAILY
Qty: 30 TAB | Refills: 0 | Status: SHIPPED | OUTPATIENT
Start: 2018-02-27

## 2018-02-26 RX ORDER — LISINOPRIL 20 MG/1
20 TABLET ORAL DAILY
Qty: 30 TAB | Refills: 0 | Status: SHIPPED
Start: 2018-02-27

## 2018-02-26 RX ORDER — LISINOPRIL 20 MG/1
20 TABLET ORAL DAILY
Status: DISCONTINUED | OUTPATIENT
Start: 2018-02-27 | End: 2018-03-10 | Stop reason: HOSPADM

## 2018-02-26 RX ORDER — ONDANSETRON 4 MG/1
4 TABLET, ORALLY DISINTEGRATING ORAL
Status: DISCONTINUED | OUTPATIENT
Start: 2018-02-26 | End: 2018-03-10 | Stop reason: HOSPADM

## 2018-02-26 RX ORDER — DOCUSATE SODIUM 100 MG/1
100 CAPSULE, LIQUID FILLED ORAL 2 TIMES DAILY
Qty: 60 CAP | Refills: 2 | Status: SHIPPED
Start: 2018-02-26 | End: 2018-05-27

## 2018-02-26 RX ORDER — ACETAMINOPHEN 325 MG/1
650 TABLET ORAL
Status: DISCONTINUED | OUTPATIENT
Start: 2018-02-26 | End: 2018-02-26 | Stop reason: HOSPADM

## 2018-02-26 RX ORDER — LANOLIN ALCOHOL/MO/W.PET/CERES
3 CREAM (GRAM) TOPICAL
Status: DISCONTINUED | OUTPATIENT
Start: 2018-02-26 | End: 2018-03-10 | Stop reason: HOSPADM

## 2018-02-26 RX ORDER — ACETAMINOPHEN 650 MG/1
650 SUPPOSITORY RECTAL
Status: DISCONTINUED | OUTPATIENT
Start: 2018-02-26 | End: 2018-02-26 | Stop reason: HOSPADM

## 2018-02-26 RX ORDER — ATORVASTATIN CALCIUM 20 MG/1
20 TABLET, FILM COATED ORAL
Qty: 30 TAB | Refills: 0 | Status: SHIPPED
Start: 2018-02-26

## 2018-02-26 RX ADMIN — ACETAMINOPHEN 650 MG: 325 TABLET, FILM COATED ORAL at 10:32

## 2018-02-26 RX ADMIN — LISINOPRIL 20 MG: 20 TABLET ORAL at 10:03

## 2018-02-26 RX ADMIN — FAMOTIDINE 20 MG: 20 TABLET, FILM COATED ORAL at 10:03

## 2018-02-26 RX ADMIN — CARVEDILOL 12.5 MG: 12.5 TABLET, FILM COATED ORAL at 22:49

## 2018-02-26 RX ADMIN — ASPIRIN 81 MG 81 MG: 81 TABLET ORAL at 10:03

## 2018-02-26 RX ADMIN — AMLODIPINE BESYLATE 10 MG: 5 TABLET ORAL at 10:03

## 2018-02-26 RX ADMIN — CARVEDILOL 12.5 MG: 12.5 TABLET, FILM COATED ORAL at 10:03

## 2018-02-26 RX ADMIN — ATORVASTATIN CALCIUM 20 MG: 20 TABLET, FILM COATED ORAL at 22:44

## 2018-02-26 NOTE — PROGRESS NOTES
[facility] can accept the patient pending his contribution of $1500 as a down payment with a plan to pay 10% of his overall bill at [address] [place] [address] via a payment plan. [phone] ([facility]), Elif Alegre RN/CRM, coordinated with [facility] to confirm that they can provide PT visits post discharge from [address] patient is unable to pay the $1500, so this  sent a request to the [facility] on Thursday, February 22, 2018. Approval for $1500 from the [facility] has now been obtained and sent to [email or web address]. The check will be sent to ([place] , [facility], [place] [address], 2nd floor, [address], Attn: Accounting Office). Called and spoke with Dave Logan, [address] [place] [address] liaison Milla ROMERO#[phone]) who plans to call back after the [facility] \"morning meeting\" to determine bed availability. Care Management will continue to follow his disposition.    BEN Mistry

## 2018-02-26 NOTE — PROGRESS NOTES
Reviewed medical chart; met with the patient at the bedside. Inpatient Rehabilitation - Patient has been approved for transfer to [employer]. Down payment of $1500 was covered by the [facility]. Patient expressed understanding and agreement to paying the 10% of the remaining bill for [facility] on a payment plan post rehabilitation/recovery. Patient does not have any transportation options at this time. Patient will need ambulance transportation. Referral sent via [employer] to [facility] for a 2:00PM  time. [facility] confirmed that they are able to pick the patient up at 2:30PM.  Discharge envelope is on the hard chart. The nurse will need to add the [facility], Kardex, completed EMTALA, and call report P#[phone]. Free [address] [place] [address] Specialist, will schedule a f/u appointment at the [address] [place] [address] at [facility]. [facility] packet provided to the patient. Home Health Follow Up [address] [place] [address] Dr RN/CRM, coordinated with [facility] to confirm that they can provide PT visits post discharge from [facility], Management will continue to follow his disposition.    BEN Hartman

## 2018-02-26 NOTE — DISCHARGE INSTRUCTIONS
Discharge Summary         PATIENT ID: Jose Krishnamurthy  MRN: [id number]   YOB: 1970    DATE OF ADMISSION: 2/13/2018 12:39 PM    DATE OF DISCHARGE: 2/26/2018   PRIMARY CARE PROVIDER: None      ATTENDING PHYSICIAN: Dr. Tally Dubin  DISCHARGING PROVIDER: Ilda Briceno NP    To contact this individual call [phone] and ask the  to page. If unavailable ask to be transferred the Adult Hospitalist Department.     CONSULTATIONS: IP CONSULT TO CARDIOLOGY  IP CONSULT TO NEUROLOGY     PROCEDURES/SURGERIES: * No surgery found *     [address] [facility] COURSE:   Mr. Tj Meneses is a 51 y/o right handed AA male who presented on 2/13 with uncontrolled hypertension, left arm/lefg weakness. Pt has not seen PCP or taken medications for htn in several years due to lack of medical insurance. Pt had a CT of the head revealing multiple hypodensities concerning for prior infarction, though the patient denied any known history of stroke. MRI brain  confirmed acute right periventricular white matter ischemic CVA.  Multiple old lacunar infarcts in bilateral cerebral white matter, alex, and cerebellum. Multiple punctate areas of hemosiderin deposit. VSS, pt stable for rehab. MUST [facility] and take blood pressure mediations as directed.      DISCHARGE DIAGNOSES / PLAN:       Acute Right Periventricular white matter ischemic CVA  - not a candidate for tPA on admission per neurology  - CTA head/neck- Multifocal areas of presumably remote ischemia in the basal ganglia. - MRI showed acute nonhemorrhagic right deep cerebral white matter infarct/ischemia and extensive chronic ischemic findings.   - evaluated by neurology, c/w htn control, asa, statin   - pt/ot     Hypertensive Urgency: Secondary to medication non compliance - Improved  - has not seen a PCP in years, no insurance. [facility]  - BP has improved, even on the lower side at times. - Continue Norvasc 10 mg PO daily, coreg 12.5 mg BID. Lisinopril 20 mg daily  - Goal SBP < 130/80 mm hg  - low salt diet      Acute renal insufficiency: RESOVLED  Probably multifactorial including ACEi and inadequate PO intake.      Abnormal EKG/Elevated troponin:   - CT chest/abd/pel without acute finding. Appreciated cardiology input.   - ECHO with normal EF and LVH  - non specific elevated troponin likely 2/2 htn     DM2  - a1c 6.6      Dyslipidemia   - , started on atorvastatin 20mg daily, will need fasting lipids and CMP checked in 6-8 weeks         VTE prophylaxis: SCD  Code status: Full  Discussed plan of care with Patient/Family and Nurse. Pre-admission lived at home.          PENDING TEST RESULTS:   At the time of discharge the following test results are still pending: none     FOLLOW UP APPOINTMENTS:    Follow-up Information     Follow up With Details Comments Contact Info     Read Cranker, NP On 3/15/2018 Hospital follow up PCP appointment from [address] [place] [address] for Thursday, 3/15/18 @ 1:00 p.m. Patient needs to arrive 15 minutes early, with photo ID, insurance card and a listing of all medications. [address]   [address] 1 day Home Health for PT after discharge from [place] [address]  [address]     [address] [facility] [address]   [phone] [facility]   Financial Screening Appointment:  [address]  [phone]            ADDITIONAL CARE RECOMMENDATIONS: As above     DIET: Cardiac     ACTIVITY: per rehab     WOUND CARE: none     EQUIPMENT needed: none        DISCHARGE MEDICATIONS:      Current Discharge Medication List           START taking these medications     Details   amLODIPine (NORVASC) 10 mg tablet Take 1 Tab by mouth daily.   Qty: 30 Tab, Refills: 0       carvedilol (COREG) 12.5 mg tablet Take 1 Tab by mouth two (2) times a day. Qty: 60 Tab, Refills: 0       lisinopril (PRINIVIL, ZESTRIL) 20 mg tablet Take 1 Tab by mouth daily. Qty: 30 Tab, Refills: 0       docusate sodium (COLACE) 100 mg capsule Take 1 Cap by mouth two (2) times a day for 90 days. Qty: 60 Cap, Refills: 2       atorvastatin (LIPITOR) 20 mg tablet Take 1 Tab by mouth nightly. Qty: 30 Tab, Refills: 0       aspirin 81 mg chewable tablet Take 1 Tab by mouth daily. Qty: 30 Tab, Refills: 0                    NOTIFY YOUR PHYSICIAN FOR ANY OF THE FOLLOWING:   Fever over 101 degrees for 24 hours. Chest pain, shortness of breath, fever, chills, nausea, vomiting, diarrhea, change in mentation, falling, weakness, bleeding. Severe pain or pain not relieved by medications. Or, any other signs or symptoms that you may have questions about.     DISPOSITION:     Home With:    OT   PT   HH   RN      xx Long term SNF/Inpatient Rehab     Independent/assisted living     Hospice     Other:         PATIENT CONDITION AT DISCHARGE:      Functional status     Poor    xx Deconditioned      Independent       Cognition    xx Lucid      Forgetful      Dementia       Catheters/lines (plus indication)     Braden      PICC      PEG    xx None       Code status    xx Full code      DNR       PHYSICAL EXAMINATION AT DISCHARGE:  General:  Alert, cooperative, no distress, appears stated age. He is comfortably lying in the bed. Lungs:   Clear to auscultation bilaterally. Chest wall:  No tenderness or deformity. Heart:  Regular rate and rhythm, S1, S2 normal, no murmur. Abdomen:   Soft, non-tender. Bowel sounds normal.    Extremities: Extremities normal, atraumatic, no cyanosis or edema. Pulses: 2+ and symmetric all extremities. Skin: Skin color, texture, turgor normal. No rashes or lesions   Neurologic:  AA&Ox3, speech not slurred. Normal memory. No facial asymmetry.  EOMI, + dsymetria with FTN on left. LLE strength is 4/5         CHRONIC MEDICAL DIAGNOSES:  Problem List as of 2018  Date Reviewed: 2018             Codes Class Noted - Resolved     Left-sided weakness ICD-10-CM: R53.1  ICD-9-CM: 728.87   2018 - Present           Acute CVA (cerebrovascular accident) [facility]) ICD-10-CM: I63.9  ICD-9-CM: 434.91   2018 - Present           RESOLVED: Hypertensive urgency ICD-10-CM: I16.0  ICD-9-CM: 401. 9   2018 - 2018           RESOLVED: Abnormal EKG ICD-10-CM: R94.31  ICD-9-CM: 794.31   2018 - 2018                  Greater than 30 minutes were spent with the patient on counseling and coordination of care     Signed:   Joseline Iglesias NP  2018  11:55 AM                         [employer] Activation    Thank you for requesting access to [address] Please follow the instructions below to securely access and download your online medical record. [employer] allows you to send messages to your doctor, view your test results, renew your prescriptions, schedule appointments, and more. How Do I Sign Up? 1. In your internet browser, go to [email or web address]  2. Click on the First Time User? Click Here link in the Sign In box. You will be redirect to the New Member Sign Up page. 3. Enter your [employer] Access Code exactly as it appears below. You will not need to use this code after youve completed the sign-up process. If you do not sign up before the expiration date, you must request a new code. [employer] Access Code: [email or web address]  Expires: 2018  3:14 PM (This is the date your [employer] access code will )    4. Enter the last four digits of your Social Security Number (xxxx) and Date of Birth (mm/dd/yyyy) as indicated and click Submit. You will be taken to the next sign-up page. 5. Create a [employer] ID. This will be your [employer] login ID and cannot be changed, so think of one that is secure and easy to remember. 6. Create a MyChart password.  You can change your password at any time. 7. Enter your Password Reset Question and Answer. This can be used at a later time if you forget your password. 8. Enter your e-mail address. You will receive e-mail notification when new information is available in [address] 9. Click Sign Up. You can now view and download portions of your medical record. 10. Click the Download Summary menu link to download a portable copy of your medical information. Additional Information    If you have questions, please visit the Frequently Asked Questions section of the [employer] website at [email or web address] Remember, [employer] is NOT to be used for urgent needs. For medical emergencies, dial 911. DISCHARGE SUMMARY from Nurse    PATIENT INSTRUCTIONS:    After general anesthesia or intravenous sedation, for 24 hours or while taking prescription Narcotics:  · Limit your activities  · Do not drive and operate hazardous machinery  · Do not make important personal or business decisions  · Do  not drink alcoholic beverages  · If you have not urinated within 8 hours after discharge, please contact your surgeon on call. Report the following to your surgeon:  · Excessive pain, swelling, redness or odor of or around the surgical area  · Temperature over 100.5  · Nausea and vomiting lasting longer than 4 hours or if unable to take medications  · Any signs of decreased circulation or nerve impairment to extremity: change in color, persistent  numbness, tingling, coldness or increase pain  · Any questions    What to do at Home:      *  Please give a list of your current medications to your Primary Care Provider. *  Please update this list whenever your medications are discontinued, doses are      changed, or new medications (including over-the-counter products) are added. *  Please carry medication information at all times in case of emergency situations.     These are general instructions for a healthy lifestyle:    No smoking/ No tobacco products/ Avoid exposure to second hand smoke  Surgeon General's Warning:  Quitting smoking now greatly reduces serious risk to your health. Obesity, smoking, and sedentary lifestyle greatly increases your risk for illness    A healthy diet, regular physical exercise & weight monitoring are important for maintaining a healthy lifestyle    You may be retaining fluid if you have a history of heart failure or if you experience any of the following symptoms:  Weight gain of 3 pounds or more overnight or 5 pounds in a week, increased swelling in our hands or feet or shortness of breath while lying flat in bed. Please call your doctor as soon as you notice any of these symptoms; do not wait until your next office visit. Recognize signs and symptoms of STROKE:    F-face looks uneven    A-arms unable to move or move unevenly    S-speech slurred or non-existent    T-time-call 911 as soon as signs and symptoms begin-DO NOT go       Back to bed or wait to see if you get better-TIME IS BRAIN. Warning Signs of HEART ATTACK     Call 911 if you have these symptoms:   Chest discomfort. Most heart attacks involve discomfort in the center of the chest that lasts more than a few minutes, or that goes away and comes back. It can feel like uncomfortable pressure, squeezing, fullness, or pain.  Discomfort in other areas of the upper body. Symptoms can include pain or discomfort in one or both arms, the back, neck, jaw, or stomach.  Shortness of breath with or without chest discomfort.  Other signs may include breaking out in a cold sweat, nausea, or lightheadedness. Don't wait more than five minutes to call 911 - MINUTES MATTER! Fast action can save your life. Calling 911 is almost always the fastest way to get lifesaving treatment. Emergency Medical Services staff can begin treatment when they arrive -- up to an hour sooner than if someone gets to the hospital by car.      The discharge information has been reviewed with the patient. The patient verbalized understanding. Discharge medications reviewed with the patient and appropriate educational materials and side effects teaching were provided.   ___________________________________________________________________________________________________________________________________

## 2018-02-26 NOTE — PROGRESS NOTES
Bedside and Verbal shift change report given to Jennifer Ordoñez RN (oncoming nurse) by Akilah Ch RN (offgoing nurse). Report included the following information SBAR, Kardex, ED Summary, Intake/Output, MAR and Recent Results.

## 2018-02-26 NOTE — PROGRESS NOTES
Patient listed as not having a primary care physician. Hospital follow-up PCP transitional care (financial screening) appointment has been scheduled with [facility] for Monday, 3/19/18 at 1:00 p.m. Patient needs to provide completed application, photo ID and proof of income. Pending patient discharge.   Nicole Cherry, Care Management Specialist.

## 2018-02-26 NOTE — PROGRESS NOTES
Full note to follow    BP controlled  [facility], 4/10, give tylenol      Myles Hodges, NP

## 2018-02-26 NOTE — PROGRESS NOTES
Problem: Mobility Impaired (Adult and Pediatric)  Goal: *Acute Goals and Plan of Care (Insert Text)  Physical Therapy Goals  Weekly reassessment completed 2/21/18. Goals remain appropriate for carryover. Initiated 2/14/2018  1. Patient will move from supine to sit and sit to supine  in bed with modified independence within 7 day(s). 2.  Patient will transfer from bed to chair and chair to bed with supervision/set-up using the least restrictive device within 7 day(s). 3.  Patient will perform sit to stand with supervision/set-up within 7 day(s). 4.  Patient will ambulate with minimal assistance/contact guard assist for 150 feet with the least restrictive device within 7 day(s). 5.  Patient will ascend/descend 6 stairs with 1 handrail(s) with minimal assistance/contact guard assist within 7 day(s). 6.  Patient will improve Mendiola Balance score by 7 points within 7 days. physical Therapy TREATMENT  Patient: Kiko Lobato (50 y.o. male)  Date: 2/26/2018  Diagnosis: Left-sided weakness  Hypertensive urgency  Acute CVA (cerebrovascular accident) [facility])  Hypertensive urgency  Abnormal EKG <principal problem not specified>       Precautions:    Chart, physical therapy assessment, plan of care and goals were reviewed. ASSESSMENT:  Pt eager to ambulate. Pt had decrease hyperextension with gait but increase difficulties with left foot clearance. Increase tone noted in left UE with gait. Pt required variable assistance with gait min to mod A due to unsteadiness and decrease weight shifting. Pt would benefit from inpt rehab to continue progression and independence. Progression toward goals:  [x]      Improving appropriately and progressing toward goals  []      Improving slowly and progressing toward goals  []      Not making progress toward goals and plan of care will be adjusted     PLAN:  Patient continues to benefit from skilled intervention to address the above impairments.   Continue treatment per established plan of care. Discharge Recommendations:  Inpatient Rehab  Further Equipment Recommendations for Discharge:  TBD     SUBJECTIVE:   Patient stated I's ready.     OBJECTIVE DATA SUMMARY:   Critical Behavior:  Neurologic State: Alert  Orientation Level: Oriented X4  Cognition: Follows commands  Safety/Judgement: Awareness of environment  Functional Mobility Training:  Bed Mobility:     Supine to Sit: Modified independent               Transfers:  Sit to Stand: Contact guard assistance  Stand to Sit: Contact guard assistance        Bed to Chair: Contact guard assistance (with defnate use of hands)                    Balance:  Sitting: Intact  Standing: Impaired  Standing - Static: Fair  Standing - Dynamic : Poor  Ambulation/Gait Training:  Distance (ft): 100 Feet (ft) (+50 feet + 100 feet without A. D.)  Assistive Device: Cane, straight;Gait belt  Ambulation - Level of Assistance: Minimal assistance; Moderate assistance        Gait Abnormalities: Decreased step clearance; Foot drop; Hemiplegic; Path deviations;Trunk sway increased           Stance: Left decreased        Swing Pattern: Left asymmetrical                 Stairs:             Neuro Re-Education:  Static standing pt shifted to right, facilitation to even weightshift  Therapeutic Exercises:     Pain:  Pain Scale 1: Numeric (0 - 10)  Pain Intensity 1: 0              Activity Tolerance:   Limited   Please refer to the flowsheet for vital signs taken during this treatment.   After treatment:   [x] Patient left in no apparent distress sitting up in chair  [] Patient left in no apparent distress in bed  [x] Call bell left within reach  [x] Nursing notified  [] Caregiver present  [] Bed alarm activated    COMMUNICATION/COLLABORATION:   The patients plan of care was discussed with: Registered Nurse    Stacey Resendez PTA   Time Calculation: 26 mins

## 2018-02-27 LAB
25(OH)D3 SERPL-MCNC: <9 NG/ML (ref 30–100)
ANION GAP SERPL CALC-SCNC: 8 MMOL/L (ref 5–15)
BUN SERPL-MCNC: 27 MG/DL (ref 6–20)
BUN/CREAT SERPL: 24 (ref 12–20)
CALCIUM SERPL-MCNC: 9.2 MG/DL (ref 8.5–10.1)
CHLORIDE SERPL-SCNC: 100 MMOL/L (ref 97–108)
CO2 SERPL-SCNC: 25 MMOL/L (ref 21–32)
CREAT SERPL-MCNC: 1.13 MG/DL (ref 0.7–1.3)
ERYTHROCYTE [DISTWIDTH] IN BLOOD BY AUTOMATED COUNT: 13.2 % (ref 11.5–14.5)
GLUCOSE SERPL-MCNC: 118 MG/DL (ref 65–100)
HCT VFR BLD AUTO: 44 % (ref 36.6–50.3)
HGB BLD-MCNC: 14.2 G/DL (ref 12.1–17)
MCH RBC QN AUTO: 25.8 PG (ref 26–34)
MCHC RBC AUTO-ENTMCNC: 32.3 G/DL (ref 30–36.5)
MCV RBC AUTO: 79.9 FL (ref 80–99)
NRBC # BLD: 0 K/UL (ref 0–0.01)
NRBC BLD-RTO: 0 PER 100 WBC
PLATELET # BLD AUTO: 338 K/UL (ref 150–400)
PMV BLD AUTO: 11.3 FL (ref 8.9–12.9)
POTASSIUM SERPL-SCNC: 4.1 MMOL/L (ref 3.5–5.1)
RBC # BLD AUTO: 5.51 M/UL (ref 4.1–5.7)
SODIUM SERPL-SCNC: 133 MMOL/L (ref 136–145)
WBC # BLD AUTO: 8.9 K/UL (ref 4.1–11.1)

## 2018-02-27 PROCEDURE — 74011250637 HC RX REV CODE- 250/637: Performed by: PHYSICAL MEDICINE & REHABILITATION

## 2018-02-27 PROCEDURE — 36415 COLL VENOUS BLD VENIPUNCTURE: CPT | Performed by: PHYSICAL MEDICINE & REHABILITATION

## 2018-02-27 PROCEDURE — 80048 BASIC METABOLIC PNL TOTAL CA: CPT | Performed by: PHYSICAL MEDICINE & REHABILITATION

## 2018-02-27 PROCEDURE — 74011250636 HC RX REV CODE- 250/636: Performed by: PHYSICAL MEDICINE & REHABILITATION

## 2018-02-27 PROCEDURE — 82306 VITAMIN D 25 HYDROXY: CPT | Performed by: PHYSICAL MEDICINE & REHABILITATION

## 2018-02-27 PROCEDURE — 85027 COMPLETE CBC AUTOMATED: CPT | Performed by: PHYSICAL MEDICINE & REHABILITATION

## 2018-02-27 RX ADMIN — CARVEDILOL 12.5 MG: 12.5 TABLET, FILM COATED ORAL at 21:35

## 2018-02-27 RX ADMIN — LISINOPRIL 20 MG: 20 TABLET ORAL at 09:11

## 2018-02-27 RX ADMIN — CARVEDILOL 12.5 MG: 12.5 TABLET, FILM COATED ORAL at 09:13

## 2018-02-27 RX ADMIN — ENOXAPARIN SODIUM 40 MG: 40 INJECTION SUBCUTANEOUS at 09:13

## 2018-02-27 RX ADMIN — AMLODIPINE BESYLATE 10 MG: 5 TABLET ORAL at 09:13

## 2018-02-27 RX ADMIN — ATORVASTATIN CALCIUM 20 MG: 20 TABLET, FILM COATED ORAL at 21:35

## 2018-02-27 RX ADMIN — ASPIRIN 81 MG 81 MG: 81 TABLET ORAL at 09:13

## 2018-02-28 LAB
BACTERIA SPEC CULT: NORMAL
CC UR VC: NORMAL
SERVICE CMNT-IMP: NORMAL

## 2018-02-28 PROCEDURE — 74011250636 HC RX REV CODE- 250/636: Performed by: PHYSICAL MEDICINE & REHABILITATION

## 2018-02-28 PROCEDURE — 74011250637 HC RX REV CODE- 250/637: Performed by: PHYSICAL MEDICINE & REHABILITATION

## 2018-02-28 RX ORDER — LIDOCAINE HYDROCHLORIDE 20 MG/ML
5 INJECTION, SOLUTION EPIDURAL; INFILTRATION; INTRACAUDAL; PERINEURAL ONCE
Status: COMPLETED | OUTPATIENT
Start: 2018-03-01 | End: 2018-03-01

## 2018-02-28 RX ORDER — TRIAMCINOLONE ACETONIDE 40 MG/ML
40 INJECTION, SUSPENSION INTRA-ARTICULAR; INTRAMUSCULAR ONCE
Status: COMPLETED | OUTPATIENT
Start: 2018-03-01 | End: 2018-03-01

## 2018-02-28 RX ADMIN — CARVEDILOL 12.5 MG: 12.5 TABLET, FILM COATED ORAL at 09:23

## 2018-02-28 RX ADMIN — ACETAMINOPHEN 650 MG: 325 TABLET ORAL at 09:22

## 2018-02-28 RX ADMIN — ATORVASTATIN CALCIUM 20 MG: 20 TABLET, FILM COATED ORAL at 20:28

## 2018-02-28 RX ADMIN — AMLODIPINE BESYLATE 10 MG: 5 TABLET ORAL at 09:23

## 2018-02-28 RX ADMIN — ASPIRIN 81 MG 81 MG: 81 TABLET ORAL at 09:22

## 2018-02-28 RX ADMIN — ACETAMINOPHEN 650 MG: 325 TABLET ORAL at 18:06

## 2018-02-28 RX ADMIN — ENOXAPARIN SODIUM 40 MG: 40 INJECTION SUBCUTANEOUS at 09:22

## 2018-02-28 RX ADMIN — CARVEDILOL 12.5 MG: 12.5 TABLET, FILM COATED ORAL at 20:27

## 2018-02-28 RX ADMIN — LISINOPRIL 20 MG: 20 TABLET ORAL at 09:23

## 2018-02-28 RX ADMIN — MAGNESIUM HYDROXIDE 30 ML: 400 SUSPENSION ORAL at 18:06

## 2018-03-01 PROCEDURE — 74011000250 HC RX REV CODE- 250: Performed by: PHYSICAL MEDICINE & REHABILITATION

## 2018-03-01 PROCEDURE — 74011250636 HC RX REV CODE- 250/636: Performed by: PHYSICAL MEDICINE & REHABILITATION

## 2018-03-01 PROCEDURE — 74011250637 HC RX REV CODE- 250/637: Performed by: PHYSICAL MEDICINE & REHABILITATION

## 2018-03-01 RX ADMIN — ENOXAPARIN SODIUM 40 MG: 40 INJECTION SUBCUTANEOUS at 09:03

## 2018-03-01 RX ADMIN — AMLODIPINE BESYLATE 10 MG: 5 TABLET ORAL at 09:04

## 2018-03-01 RX ADMIN — LIDOCAINE HYDROCHLORIDE 100 MG: 20 INJECTION, SOLUTION EPIDURAL; INFILTRATION; INTRACAUDAL; PERINEURAL at 06:31

## 2018-03-01 RX ADMIN — CARVEDILOL 12.5 MG: 12.5 TABLET, FILM COATED ORAL at 21:06

## 2018-03-01 RX ADMIN — TRIAMCINOLONE ACETONIDE 40 MG: 40 INJECTION, SUSPENSION INTRA-ARTICULAR; INTRAMUSCULAR at 07:27

## 2018-03-01 RX ADMIN — TRIAMCINOLONE ACETONIDE 40 MG: 40 INJECTION, SUSPENSION INTRA-ARTICULAR; INTRAMUSCULAR at 06:32

## 2018-03-01 RX ADMIN — ATORVASTATIN CALCIUM 20 MG: 20 TABLET, FILM COATED ORAL at 21:06

## 2018-03-01 RX ADMIN — LISINOPRIL 20 MG: 20 TABLET ORAL at 09:04

## 2018-03-01 RX ADMIN — CARVEDILOL 12.5 MG: 12.5 TABLET, FILM COATED ORAL at 09:04

## 2018-03-01 RX ADMIN — LIDOCAINE HYDROCHLORIDE 100 MG: 20 INJECTION, SOLUTION EPIDURAL; INFILTRATION; INTRACAUDAL; PERINEURAL at 07:27

## 2018-03-01 RX ADMIN — ASPIRIN 81 MG 81 MG: 81 TABLET ORAL at 09:04

## 2018-03-02 PROCEDURE — 74011250637 HC RX REV CODE- 250/637: Performed by: PHYSICAL MEDICINE & REHABILITATION

## 2018-03-02 PROCEDURE — 74011250636 HC RX REV CODE- 250/636: Performed by: PHYSICAL MEDICINE & REHABILITATION

## 2018-03-02 RX ADMIN — CARVEDILOL 12.5 MG: 12.5 TABLET, FILM COATED ORAL at 08:56

## 2018-03-02 RX ADMIN — CARVEDILOL 12.5 MG: 12.5 TABLET, FILM COATED ORAL at 22:26

## 2018-03-02 RX ADMIN — ENOXAPARIN SODIUM 40 MG: 40 INJECTION SUBCUTANEOUS at 08:57

## 2018-03-02 RX ADMIN — ATORVASTATIN CALCIUM 20 MG: 20 TABLET, FILM COATED ORAL at 22:26

## 2018-03-02 RX ADMIN — AMLODIPINE BESYLATE 10 MG: 5 TABLET ORAL at 08:57

## 2018-03-02 RX ADMIN — ASPIRIN 81 MG 81 MG: 81 TABLET ORAL at 08:56

## 2018-03-02 RX ADMIN — LISINOPRIL 20 MG: 20 TABLET ORAL at 08:56

## 2018-03-03 PROCEDURE — 74011250636 HC RX REV CODE- 250/636: Performed by: PHYSICAL MEDICINE & REHABILITATION

## 2018-03-03 PROCEDURE — 74011250637 HC RX REV CODE- 250/637: Performed by: PHYSICAL MEDICINE & REHABILITATION

## 2018-03-03 RX ADMIN — CARVEDILOL 12.5 MG: 12.5 TABLET, FILM COATED ORAL at 21:47

## 2018-03-03 RX ADMIN — ASPIRIN 81 MG 81 MG: 81 TABLET ORAL at 08:51

## 2018-03-03 RX ADMIN — CARVEDILOL 12.5 MG: 12.5 TABLET, FILM COATED ORAL at 08:51

## 2018-03-03 RX ADMIN — LISINOPRIL 20 MG: 20 TABLET ORAL at 08:51

## 2018-03-03 RX ADMIN — ATORVASTATIN CALCIUM 20 MG: 20 TABLET, FILM COATED ORAL at 21:47

## 2018-03-03 RX ADMIN — ENOXAPARIN SODIUM 40 MG: 40 INJECTION SUBCUTANEOUS at 08:50

## 2018-03-03 RX ADMIN — AMLODIPINE BESYLATE 10 MG: 5 TABLET ORAL at 08:50

## 2018-03-04 PROCEDURE — 74011250637 HC RX REV CODE- 250/637: Performed by: PHYSICAL MEDICINE & REHABILITATION

## 2018-03-04 PROCEDURE — 74011250636 HC RX REV CODE- 250/636: Performed by: PHYSICAL MEDICINE & REHABILITATION

## 2018-03-04 RX ADMIN — ASPIRIN 81 MG 81 MG: 81 TABLET ORAL at 08:01

## 2018-03-04 RX ADMIN — LISINOPRIL 20 MG: 20 TABLET ORAL at 08:01

## 2018-03-04 RX ADMIN — CARVEDILOL 12.5 MG: 12.5 TABLET, FILM COATED ORAL at 08:01

## 2018-03-04 RX ADMIN — MAGNESIUM HYDROXIDE 30 ML: 400 SUSPENSION ORAL at 20:57

## 2018-03-04 RX ADMIN — CARVEDILOL 12.5 MG: 12.5 TABLET, FILM COATED ORAL at 20:49

## 2018-03-04 RX ADMIN — ATORVASTATIN CALCIUM 20 MG: 20 TABLET, FILM COATED ORAL at 20:48

## 2018-03-04 RX ADMIN — AMLODIPINE BESYLATE 10 MG: 5 TABLET ORAL at 08:01

## 2018-03-04 RX ADMIN — ENOXAPARIN SODIUM 40 MG: 40 INJECTION SUBCUTANEOUS at 08:00

## 2018-03-05 LAB
ANION GAP SERPL CALC-SCNC: 6 MMOL/L (ref 5–15)
BUN SERPL-MCNC: 40 MG/DL (ref 6–20)
BUN/CREAT SERPL: 33 (ref 12–20)
CALCIUM SERPL-MCNC: 9.1 MG/DL (ref 8.5–10.1)
CHLORIDE SERPL-SCNC: 105 MMOL/L (ref 97–108)
CO2 SERPL-SCNC: 25 MMOL/L (ref 21–32)
CREAT SERPL-MCNC: 1.23 MG/DL (ref 0.7–1.3)
GLUCOSE SERPL-MCNC: 122 MG/DL (ref 65–100)
POTASSIUM SERPL-SCNC: 4.6 MMOL/L (ref 3.5–5.1)
SODIUM SERPL-SCNC: 136 MMOL/L (ref 136–145)

## 2018-03-05 PROCEDURE — 36415 COLL VENOUS BLD VENIPUNCTURE: CPT | Performed by: PHYSICAL MEDICINE & REHABILITATION

## 2018-03-05 PROCEDURE — 74011250636 HC RX REV CODE- 250/636: Performed by: PHYSICAL MEDICINE & REHABILITATION

## 2018-03-05 PROCEDURE — 80048 BASIC METABOLIC PNL TOTAL CA: CPT | Performed by: PHYSICAL MEDICINE & REHABILITATION

## 2018-03-05 PROCEDURE — 74011250637 HC RX REV CODE- 250/637: Performed by: PHYSICAL MEDICINE & REHABILITATION

## 2018-03-05 RX ADMIN — CARVEDILOL 12.5 MG: 12.5 TABLET, FILM COATED ORAL at 08:37

## 2018-03-05 RX ADMIN — LISINOPRIL 20 MG: 20 TABLET ORAL at 08:37

## 2018-03-05 RX ADMIN — CARVEDILOL 12.5 MG: 12.5 TABLET, FILM COATED ORAL at 21:27

## 2018-03-05 RX ADMIN — ATORVASTATIN CALCIUM 20 MG: 20 TABLET, FILM COATED ORAL at 21:27

## 2018-03-05 RX ADMIN — ENOXAPARIN SODIUM 40 MG: 40 INJECTION SUBCUTANEOUS at 08:37

## 2018-03-05 RX ADMIN — ASPIRIN 81 MG 81 MG: 81 TABLET ORAL at 08:37

## 2018-03-05 RX ADMIN — AMLODIPINE BESYLATE 10 MG: 5 TABLET ORAL at 08:37

## 2018-03-06 PROCEDURE — 74011250636 HC RX REV CODE- 250/636: Performed by: PHYSICAL MEDICINE & REHABILITATION

## 2018-03-06 PROCEDURE — 74011250637 HC RX REV CODE- 250/637: Performed by: PHYSICAL MEDICINE & REHABILITATION

## 2018-03-06 RX ORDER — ATORVASTATIN CALCIUM 40 MG/1
40 TABLET, FILM COATED ORAL
Status: DISCONTINUED | OUTPATIENT
Start: 2018-03-06 | End: 2018-03-10 | Stop reason: HOSPADM

## 2018-03-06 RX ADMIN — ASPIRIN 81 MG 81 MG: 81 TABLET ORAL at 08:43

## 2018-03-06 RX ADMIN — ENOXAPARIN SODIUM 40 MG: 40 INJECTION SUBCUTANEOUS at 08:43

## 2018-03-06 RX ADMIN — ATORVASTATIN CALCIUM 40 MG: 40 TABLET, FILM COATED ORAL at 20:46

## 2018-03-06 RX ADMIN — AMLODIPINE BESYLATE 10 MG: 5 TABLET ORAL at 08:43

## 2018-03-06 RX ADMIN — MAGNESIUM HYDROXIDE 30 ML: 400 SUSPENSION ORAL at 08:43

## 2018-03-06 RX ADMIN — LISINOPRIL 20 MG: 20 TABLET ORAL at 08:43

## 2018-03-06 RX ADMIN — CARVEDILOL 12.5 MG: 12.5 TABLET, FILM COATED ORAL at 08:43

## 2018-03-06 RX ADMIN — CARVEDILOL 12.5 MG: 12.5 TABLET, FILM COATED ORAL at 20:46

## 2018-03-07 PROCEDURE — 74011250637 HC RX REV CODE- 250/637: Performed by: PHYSICAL MEDICINE & REHABILITATION

## 2018-03-07 RX ADMIN — ATORVASTATIN CALCIUM 40 MG: 40 TABLET, FILM COATED ORAL at 21:00

## 2018-03-07 RX ADMIN — LISINOPRIL 20 MG: 20 TABLET ORAL at 08:08

## 2018-03-07 RX ADMIN — CARVEDILOL 12.5 MG: 12.5 TABLET, FILM COATED ORAL at 08:08

## 2018-03-07 RX ADMIN — ASPIRIN 81 MG 81 MG: 81 TABLET ORAL at 08:08

## 2018-03-07 RX ADMIN — CARVEDILOL 12.5 MG: 12.5 TABLET, FILM COATED ORAL at 21:00

## 2018-03-07 RX ADMIN — AMLODIPINE BESYLATE 10 MG: 5 TABLET ORAL at 08:08

## 2018-03-08 PROCEDURE — 74011250637 HC RX REV CODE- 250/637: Performed by: PHYSICAL MEDICINE & REHABILITATION

## 2018-03-08 RX ADMIN — ASPIRIN 81 MG 81 MG: 81 TABLET ORAL at 08:17

## 2018-03-08 RX ADMIN — LISINOPRIL 20 MG: 20 TABLET ORAL at 08:17

## 2018-03-08 RX ADMIN — CARVEDILOL 12.5 MG: 12.5 TABLET, FILM COATED ORAL at 20:39

## 2018-03-08 RX ADMIN — AMLODIPINE BESYLATE 10 MG: 5 TABLET ORAL at 08:16

## 2018-03-08 RX ADMIN — CARVEDILOL 12.5 MG: 12.5 TABLET, FILM COATED ORAL at 08:17

## 2018-03-08 RX ADMIN — ATORVASTATIN CALCIUM 40 MG: 40 TABLET, FILM COATED ORAL at 20:39

## 2018-03-09 VITALS
SYSTOLIC BLOOD PRESSURE: 136 MMHG | HEIGHT: 71 IN | BODY MASS INDEX: 29.54 KG/M2 | DIASTOLIC BLOOD PRESSURE: 76 MMHG | HEART RATE: 72 BPM | WEIGHT: 211 LBS

## 2018-03-09 PROCEDURE — 74011250637 HC RX REV CODE- 250/637: Performed by: PHYSICAL MEDICINE & REHABILITATION

## 2018-03-09 RX ORDER — ERGOCALCIFEROL 1.25 MG/1
50000 CAPSULE ORAL
Status: DISCONTINUED | OUTPATIENT
Start: 2018-03-09 | End: 2018-03-10 | Stop reason: HOSPADM

## 2018-03-09 RX ADMIN — CARVEDILOL 12.5 MG: 12.5 TABLET, FILM COATED ORAL at 20:50

## 2018-03-09 RX ADMIN — ATORVASTATIN CALCIUM 40 MG: 40 TABLET, FILM COATED ORAL at 20:51

## 2018-03-09 RX ADMIN — LISINOPRIL 20 MG: 20 TABLET ORAL at 08:59

## 2018-03-09 RX ADMIN — ERGOCALCIFEROL 50000 UNITS: 1.25 CAPSULE ORAL at 13:10

## 2018-03-09 RX ADMIN — AMLODIPINE BESYLATE 10 MG: 5 TABLET ORAL at 08:59

## 2018-03-09 RX ADMIN — CARVEDILOL 12.5 MG: 12.5 TABLET, FILM COATED ORAL at 08:58

## 2018-03-09 RX ADMIN — ASPIRIN 81 MG 81 MG: 81 TABLET ORAL at 08:58

## 2018-03-10 PROCEDURE — 74011250637 HC RX REV CODE- 250/637: Performed by: PHYSICAL MEDICINE & REHABILITATION

## 2018-03-10 RX ADMIN — CARVEDILOL 12.5 MG: 12.5 TABLET, FILM COATED ORAL at 08:35

## 2018-03-10 RX ADMIN — AMLODIPINE BESYLATE 10 MG: 5 TABLET ORAL at 08:35

## 2018-03-10 RX ADMIN — ASPIRIN 81 MG 81 MG: 81 TABLET ORAL at 08:36

## 2018-03-10 RX ADMIN — LISINOPRIL 20 MG: 20 TABLET ORAL at 08:35

## 2021-08-17 NOTE — PROGRESS NOTES
Epidural Block  Performed by: David Ramires MD  Authorized by: David Ramires David Ramires MD         Hospitalist Progress Note         TESSY Toledo  Please call  and page for questions. Call physician on-call through the  7pm-7am    Daily Progress Note: 2/23/2018    Primary care provider:None    Date of admission: 2/13/2018 12:39 PM    Admission summery and hospital course:  Lakshmi Lee is a 50 y. o. male who presents with PMHx of HTN, noncompliance to medication, unable to say when he last took BP meds, admitted for left sided weakness. Pt states that he noticed it last night involving arm and leg. When asked why he did not seek medical attention last night, he said \"it could wait until morning\".      Subjective:     Patient is doing well today. States he is doing \"great\". BP is a little on the low side this morning , lower than normal. We are holding Coreg and Chlorthalidone today. Continue to monitor. Patient continues to wait on a bed. [facility] wants 1500$ downpayment for rehab. Patient doesn't have. Case managemetn is working on        Assessment/Plan:   Hypertensive Urgency: Secondary to medication non compliance  -BP still not optimally controlled. - Continue Norvasc 10 mg PO daily, Chlorthalidone, coreg. Add Lisinopril  - Goal SBP < 130/80 mm hg  - Continue Hydralazine as necessary for SBP > 160 mm hg   - low salt diet    Acute renal insufficiency: resolved  Probably multifactorial including ACEi and inadequate PO intake. Stop lisinopril   Resolved.       Left Hemiparesis likely due to Acute CVA  - ASA, add statin  - Continue PT/OT/ST  - Neurology and cardiology input appreciated.    - CTA head/neck- Multifocal areas of presumably remote ischemia in the basal ganglia. - MRI showed acute nonhemorrhagic right deep cerebral white matter infarct/ischemia and extensive chronic ischemic findings.   - Left sided weakness still persist  - continue PT/OT      Abnormal EKG/Elevated troponin:   - CT chest/abd/pel without acute finding. Appreciated cardiology input.   - ECHO with normal EF.          VTE prophylaxis: SCD  Code status: Full  Discussed plan of care with Patient/Family and Nurse. Pre-admission lived at home. Discharge planning: Pending placement       Review of Systems:     Review of Systems:  Symptom  Y/N  Comments   Symptom  Y/N  Comments    Fever/Chills   n   Chest Pain  n    Poor Appetite   n   Edema  n     Cough  n   Abdominal Pain   n    Sputum  n   Joint Pain      SOB/STREET  n   Pruritis/Rash      Nausea/vomit  n   Tolerating PT/OT      Diarrhea  n   Tolerating Diet      Constipation  n   Other      Could not obtain due to:         Objective:   Physical Exam:     Visit Vitals    /79 (BP 1 Location: Right arm, BP Patient Position: At rest;Post activity)    Pulse 70    Temp 98 °F (36.7 °C)    Resp 18    Ht 5' 11\" (1.803 m)    Wt 96.5 kg (212 lb 12.8 oz)    SpO2 97%    BMI 29.68 kg/m2      O2 Device: Room air    Temp (24hrs), Av.5 °F (36.9 °C), Min:98 °F (36.7 °C), Max:99.3 °F (37.4 °C)    701 - 1900  In: 240 [P.O.:240]  Out: 300 [Urine:300]   1901 -  0700  In: 680 [P.O.:680]  Out: 1525 [Urine:1525]     General:  Alert, cooperative, no distress, appears stated age. He is comfortably lying in the bed. Lungs:   Clear to auscultation bilaterally. Chest wall:  No tenderness or deformity. Heart:  Regular rate and rhythm, S1, S2 normal, no murmur. Abdomen:   Soft, non-tender. Bowel sounds normal.    Extremities: Extremities normal, atraumatic, no cyanosis or edema. Pulses: 2+ and symmetric all extremities. Skin: Skin color, texture, turgor normal. No rashes or lesions   Neurologic: LLE strength is 4/5            Data Review:       Recent Days:  No results for input(s): WBC, HGB, HCT, PLT, HGBEXT, HCTEXT, PLTEXT, HGBEXT, HCTEXT, PLTEXT in the last 72 hours.   Recent Labs      18   0239   NA  134*   K  4.0   CL  99   CO2  27   GLU  126*   BUN  23*   CREA  1.22   CA  9.2   MG 2.2     No results for input(s): PH, PCO2, PO2, HCO3, FIO2 in the last 72 hours. 24 Hour Results:  No results found for this or any previous visit (from the past 24 hour(s)). Problem List:  Problem List as of 2/23/2018  Never Reviewed          Codes Class Noted - Resolved    Left-sided weakness ICD-10-CM: R53.1  ICD-9-CM: 728.87  2/13/2018 - Present        Hypertensive urgency ICD-10-CM: I16.0  ICD-9-CM: 401.9  2/13/2018 - Present        Abnormal EKG ICD-10-CM: R94.31  ICD-9-CM: 794.31  2/13/2018 - Present        Acute CVA (cerebrovascular accident) [facility]) ICD-10-CM: I63.9  ICD-9-CM: 434.91  2/13/2018 - Present              Medications reviewed  Current Facility-Administered Medications   Medication Dose Route Frequency    hydrALAZINE (APRESOLINE) 20 mg/mL injection 10 mg  10 mg IntraVENous Q6H PRN    lisinopril (PRINIVIL, ZESTRIL) tablet 20 mg  20 mg Oral DAILY    carvedilol (COREG) tablet 25 mg  25 mg Oral BID    amLODIPine (NORVASC) tablet 10 mg  10 mg Oral DAILY    chlorthalidone (HYGROTEN) tablet 25 mg  25 mg Oral DAILY    atorvastatin (LIPITOR) tablet 20 mg  20 mg Oral QHS    acetaminophen (TYLENOL) tablet 650 mg  650 mg Oral Q4H PRN    Or    acetaminophen (TYLENOL) solution 650 mg  650 mg Per NG tube Q4H PRN    Or    acetaminophen (TYLENOL) suppository 650 mg  650 mg Rectal Q4H PRN    ondansetron (ZOFRAN) injection 4 mg  4 mg IntraVENous Q6H PRN    aspirin chewable tablet 81 mg  81 mg Oral DAILY    docusate sodium (COLACE) capsule 100 mg  100 mg Oral BID    bisacodyl (DULCOLAX) tablet 5 mg  5 mg Oral DAILY PRN    famotidine (PEPCID) tablet 20 mg  20 mg Oral Q12H       Care Plan discussed with: Patient/Family and Nurse    Total time spent with patient: 30 minutes.     Phong Lares NP
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
## 2022-03-19 PROBLEM — R53.1 LEFT-SIDED WEAKNESS: Status: ACTIVE | Noted: 2018-02-13

## 2022-03-19 PROBLEM — I63.9 ACUTE CVA (CEREBROVASCULAR ACCIDENT) (HCC): Status: ACTIVE | Noted: 2018-02-13

## 2024-01-19 NOTE — PROGRESS NOTES
I returned the patient's telephone call from earlier today.  She had called and verbalized some concerns to my nurse in regard to the CT chest from January 8, 2024.  She had completed stereotactic body radiotherapy to this lesion on October 6, 2023.  I reassured the patient after reviewing with her the scan as well as her treatment volumes that there is expectation oftentimes of some enlargement post stereotactic radiotherapy particularly at this time interval due to scarring.  I have asked her to obtain a repeat CT chest without contrast in early April 2024 and then to see me soon after that scan.  We will put off her upcoming appointment in February.    1.  CT chest without contrast to be performed in April 2024 (order is in the chart)  2.  Follow-up in my office just after the CT scan.  3.  Cancel February appointment.   Speech Pathology bedside swallow evaluation/discharge (of swallow)  Patient: Francisco Ohara (50 y.o. male)  Date: 2/14/2018  Primary Diagnosis: Left-sided weakness  Hypertensive urgency  Acute CVA (cerebrovascular accident) ([facility] [address]  Hypertensive urgency  Abnormal EKG        Precautions:        ASSESSMENT :  Based on the objective data described below, the patient presents with no oral or pharyngeal dysphagia. Timely and complete mastication of solids. Pharyngeal swallow initiation is suspected to be timely and hyolaryngeal elevation/excursion functional via palpation. No overt s/s aspiration with successive straw sips of thi or solid. Skilled therapy provided by a speech-language pathologist is not indicated at this time. PLAN :  Recommendations:  Regular diet/ thin liquids. Straws ok  SLP to f/u pending MRI results to determine if integrated language evaluation is indicated  Discharge Recommendations: To Be Determined     SUBJECTIVE:   Patient stated I am doing ok. OBJECTIVE:     Past Medical History:   Diagnosis Date    Hypertension    History reviewed. No pertinent surgical history.   Prior Level of Function/Home Situation:   Home Situation  Home Environment: Private residence  # Steps to Enter: 6  Rails to Enter: Yes  One/Two Story Residence: Two story  # of Interior Steps: 14  Living Alone: No  Support Systems: Family member(s)  Patient Expects to be Discharged to[de-identified] Private residence  Current DME Used/Available at Home: None  Diet prior to admission: regular/thin  Current Diet:  Regular/thin   Cognitive and Communication Status:  Neurologic State: Alert  Orientation Level: Oriented X4  Cognition: Decreased attention/concentration  Perception: Appears intact  Perseveration: No perseveration noted  Safety/Judgement: Awareness of environment  Oral Assessment:  Oral Assessment  Labial: Decreased rate;Left droop (?slight)  Dentition: Natural  Oral Hygiene:  (clean, moist)  Lingual: Left deviation (slight)  Velum: Unable to visualize  Mandible: No impairment  P.O. Trials:  Patient Position:  (upright in bed)  Vocal quality prior to P.O.: No impairment  Consistency Presented: Thin liquid; Solid  How Presented: Successive swallows     Bolus Acceptance: No impairment  Bolus Formation/Control: No impairment     Propulsion: No impairment  Oral Residue: None  Initiation of Swallow: No impairment  Laryngeal Elevation: Functional  Aspiration Signs/Symptoms: None  Pharyngeal Phase Characteristics: No impairment, issues, or problems   Effective Modifications: None          Oral Phase Severity: No impairment  Pharyngeal Phase Severity : No impairment  NOMS:   The NOMS functional outcome measure was used to quantify this patient's level of swallowing impairment. Based on the NOMS, the patient was determined to be at level 7 for swallow function     G Codes: In compliance with CMSs Claims Based Outcome Reporting, the following G-code set was chosen for this patient based the use of the NOMS functional outcome to quantify this patient's level of swallowing impairment. Using the NOMS, the patient was determined to be at level 7 for swallow function which correlates with the CH= 0% level of severity. Based on the objective assessment provided within this note, the current, goal, and discharge g-codes are as follows:    Swallow  Swallowing:   Swallow Current Status CH= 0%   Swallow Goal Status CH= 0%   Swallow D/C Status CH= 0%      NOMS Swallowing Levels:  Level 1 (CN): NPO  Level 2 (CM): NPO but takes consistency in therapy  Level 3 (CL): Takes less than 50% of nutrition p.o. and continues with nonoral feedings; and/or safe with mod cues; and/or max diet restriction  Level 4 (CK):  Safe swallow but needs mod cues; and/or mod diet restriction; and/or still requires some nonoral feeding/supplements  Level 5 (CJ): Safe swallow with min diet restriction; and/or needs min cues  Level 6 (CI): Independent with p.o.; rare cues; usually self cues; may need to avoid some foods or needs extra time  Level 7 [facility]): Independent for all p.o.  CHANDRAKANT. (2003). National Outcomes Measurement System (NOMS): Adult Speech-Language Pathology User's Guide. Pain:  Pain Scale 1: Numeric (0 - 10)  Pain Intensity 1: 0     After treatment:   [] Patient left in no apparent distress sitting up in chair  [x] Patient left in no apparent distress in bed  [x] Call bell left within reach  [x] Nursing notified  [] Caregiver present  [x] Bed alarm activated    COMMUNICATION/EDUCATION:   The patients plan of care including findings, recommendations, and recommended diet changes were discussed with: Registered Nurse.  [] Posted safety precautions in patient's room. [x] Patient/family have participated as able and agree with findings and recommendations. [] Patient is unable to participate in plan of care at this time.     Thank you for this referral.  Perlita Dos Santos M.S. CCC-SLP  Time Calculation: 10 mins

## 2025-07-24 ENCOUNTER — TRANSCRIBE ORDERS (OUTPATIENT)
Facility: HOSPITAL | Age: 55
End: 2025-07-24

## 2025-07-24 DIAGNOSIS — M54.16 LUMBAR RADICULOPATHY: Primary | ICD-10-CM
